# Patient Record
Sex: FEMALE | Race: WHITE | NOT HISPANIC OR LATINO
[De-identification: names, ages, dates, MRNs, and addresses within clinical notes are randomized per-mention and may not be internally consistent; named-entity substitution may affect disease eponyms.]

---

## 2016-12-19 RX ORDER — ASPIRIN/CALCIUM CARB/MAGNESIUM 324 MG
1 TABLET ORAL
Qty: 0 | Refills: 0 | DISCHARGE
Start: 2016-12-19

## 2017-01-04 ENCOUNTER — APPOINTMENT (OUTPATIENT)
Dept: CARDIOTHORACIC SURGERY | Facility: CLINIC | Age: 77
End: 2017-01-04

## 2017-01-04 VITALS
HEIGHT: 64 IN | SYSTOLIC BLOOD PRESSURE: 163 MMHG | HEART RATE: 78 BPM | OXYGEN SATURATION: 97 % | RESPIRATION RATE: 18 BRPM | DIASTOLIC BLOOD PRESSURE: 83 MMHG | WEIGHT: 155 LBS | BODY MASS INDEX: 26.46 KG/M2

## 2017-01-09 ENCOUNTER — APPOINTMENT (OUTPATIENT)
Dept: CARDIOLOGY | Facility: CLINIC | Age: 77
End: 2017-01-09

## 2017-01-12 ENCOUNTER — APPOINTMENT (OUTPATIENT)
Dept: CARDIOLOGY | Facility: CLINIC | Age: 77
End: 2017-01-12

## 2017-01-12 ENCOUNTER — NON-APPOINTMENT (OUTPATIENT)
Age: 77
End: 2017-01-12

## 2017-01-12 VITALS
HEIGHT: 64 IN | OXYGEN SATURATION: 97 % | WEIGHT: 155 LBS | HEART RATE: 68 BPM | BODY MASS INDEX: 26.46 KG/M2 | DIASTOLIC BLOOD PRESSURE: 86 MMHG | SYSTOLIC BLOOD PRESSURE: 145 MMHG

## 2017-01-12 RX ORDER — ASPIRIN 81 MG/1
81 TABLET ORAL
Qty: 30 | Refills: 4 | Status: ACTIVE | COMMUNITY
Start: 2017-01-12 | End: 1900-01-01

## 2017-01-18 ENCOUNTER — RECORD ABSTRACTING (OUTPATIENT)
Age: 77
End: 2017-01-18

## 2017-01-18 ENCOUNTER — APPOINTMENT (OUTPATIENT)
Dept: CARDIOTHORACIC SURGERY | Facility: CLINIC | Age: 77
End: 2017-01-18

## 2017-01-18 VITALS
HEART RATE: 84 BPM | DIASTOLIC BLOOD PRESSURE: 89 MMHG | HEIGHT: 64 IN | SYSTOLIC BLOOD PRESSURE: 163 MMHG | BODY MASS INDEX: 25.44 KG/M2 | RESPIRATION RATE: 18 BRPM | OXYGEN SATURATION: 98 % | WEIGHT: 149 LBS

## 2017-01-26 ENCOUNTER — RX RENEWAL (OUTPATIENT)
Age: 77
End: 2017-01-26

## 2017-01-31 ENCOUNTER — RX RENEWAL (OUTPATIENT)
Age: 77
End: 2017-01-31

## 2017-02-06 ENCOUNTER — APPOINTMENT (OUTPATIENT)
Dept: FAMILY MEDICINE | Facility: CLINIC | Age: 77
End: 2017-02-06

## 2017-02-06 VITALS
HEART RATE: 79 BPM | DIASTOLIC BLOOD PRESSURE: 92 MMHG | BODY MASS INDEX: 26.29 KG/M2 | WEIGHT: 154 LBS | OXYGEN SATURATION: 97 % | RESPIRATION RATE: 16 BRPM | HEIGHT: 64 IN | SYSTOLIC BLOOD PRESSURE: 162 MMHG

## 2017-05-04 ENCOUNTER — APPOINTMENT (OUTPATIENT)
Dept: FAMILY MEDICINE | Facility: CLINIC | Age: 77
End: 2017-05-04

## 2017-05-04 VITALS
HEIGHT: 64 IN | SYSTOLIC BLOOD PRESSURE: 156 MMHG | WEIGHT: 161.25 LBS | BODY MASS INDEX: 27.53 KG/M2 | DIASTOLIC BLOOD PRESSURE: 82 MMHG

## 2017-05-04 VITALS — DIASTOLIC BLOOD PRESSURE: 88 MMHG | SYSTOLIC BLOOD PRESSURE: 152 MMHG

## 2017-06-06 ENCOUNTER — RX RENEWAL (OUTPATIENT)
Age: 77
End: 2017-06-06

## 2017-06-22 ENCOUNTER — APPOINTMENT (OUTPATIENT)
Dept: FAMILY MEDICINE | Facility: CLINIC | Age: 77
End: 2017-06-22

## 2017-06-22 ENCOUNTER — RESULT CHARGE (OUTPATIENT)
Age: 77
End: 2017-06-22

## 2017-06-22 VITALS
WEIGHT: 160 LBS | DIASTOLIC BLOOD PRESSURE: 90 MMHG | HEART RATE: 87 BPM | TEMPERATURE: 97.5 F | HEIGHT: 64 IN | SYSTOLIC BLOOD PRESSURE: 132 MMHG | OXYGEN SATURATION: 95 % | BODY MASS INDEX: 27.31 KG/M2

## 2017-06-22 LAB
BILIRUB UR QL STRIP: NEGATIVE
CLARITY UR: CLEAR
COLLECTION METHOD: NORMAL
GLUCOSE UR-MCNC: NEGATIVE
HCG UR QL: 0.2 EU/DL
HGB UR QL STRIP.AUTO: NORMAL
KETONES UR-MCNC: NEGATIVE
LEUKOCYTE ESTERASE UR QL STRIP: NORMAL
NITRITE UR QL STRIP: NEGATIVE
PH UR STRIP: 5.5
PROT UR STRIP-MCNC: 100
SP GR UR STRIP: 1.01

## 2017-06-25 LAB
ALBUMIN SERPL ELPH-MCNC: 3.9 G/DL
ALP BLD-CCNC: 104 U/L
ALT SERPL-CCNC: 28 U/L
ANION GAP SERPL CALC-SCNC: 16 MMOL/L
AST SERPL-CCNC: 38 U/L
BILIRUB SERPL-MCNC: 0.2 MG/DL
BUN SERPL-MCNC: 20 MG/DL
CALCIUM SERPL-MCNC: 9.4 MG/DL
CHLORIDE SERPL-SCNC: 103 MMOL/L
CHOLEST SERPL-MCNC: 145 MG/DL
CHOLEST/HDLC SERPL: 3 RATIO
CO2 SERPL-SCNC: 22 MMOL/L
CREAT SERPL-MCNC: 0.83 MG/DL
GLUCOSE SERPL-MCNC: 99 MG/DL
HDLC SERPL-MCNC: 49 MG/DL
LDLC SERPL CALC-MCNC: 66 MG/DL
POTASSIUM SERPL-SCNC: 4.8 MMOL/L
PROT SERPL-MCNC: 7.8 G/DL
SODIUM SERPL-SCNC: 141 MMOL/L
T4 FREE SERPL-MCNC: 1.3 NG/DL
TRIGL SERPL-MCNC: 148 MG/DL
TSH SERPL-ACNC: 3.95 UIU/ML

## 2017-06-26 LAB — BACTERIA UR CULT: ABNORMAL

## 2017-07-24 ENCOUNTER — APPOINTMENT (OUTPATIENT)
Dept: CARDIOLOGY | Facility: CLINIC | Age: 77
End: 2017-07-24

## 2017-07-24 ENCOUNTER — NON-APPOINTMENT (OUTPATIENT)
Age: 77
End: 2017-07-24

## 2017-07-24 VITALS
DIASTOLIC BLOOD PRESSURE: 96 MMHG | SYSTOLIC BLOOD PRESSURE: 164 MMHG | BODY MASS INDEX: 27.31 KG/M2 | OXYGEN SATURATION: 96 % | WEIGHT: 160 LBS | HEART RATE: 70 BPM | HEIGHT: 64 IN

## 2017-08-22 ENCOUNTER — APPOINTMENT (OUTPATIENT)
Dept: FAMILY MEDICINE | Facility: CLINIC | Age: 77
End: 2017-08-22
Payer: MEDICARE

## 2017-08-22 ENCOUNTER — RESULT CHARGE (OUTPATIENT)
Age: 77
End: 2017-08-22

## 2017-08-22 VITALS
TEMPERATURE: 97.5 F | OXYGEN SATURATION: 96 % | HEIGHT: 64 IN | RESPIRATION RATE: 16 BRPM | WEIGHT: 160 LBS | BODY MASS INDEX: 27.31 KG/M2 | HEART RATE: 75 BPM | SYSTOLIC BLOOD PRESSURE: 158 MMHG | DIASTOLIC BLOOD PRESSURE: 90 MMHG

## 2017-08-22 LAB
BILIRUB UR QL STRIP: NEGATIVE
GLUCOSE UR-MCNC: NEGATIVE
HCG UR QL: 0.2 EU/DL
HGB UR QL STRIP.AUTO: NORMAL
KETONES UR-MCNC: NEGATIVE
LEUKOCYTE ESTERASE UR QL STRIP: NORMAL
NITRITE UR QL STRIP: NEGATIVE
PH UR STRIP: 5.5
PROT UR STRIP-MCNC: 100
SP GR UR STRIP: 1.01

## 2017-08-22 PROCEDURE — 81003 URINALYSIS AUTO W/O SCOPE: CPT | Mod: QW

## 2017-08-22 PROCEDURE — 99214 OFFICE O/P EST MOD 30 MIN: CPT | Mod: 25

## 2017-08-28 LAB — BACTERIA UR CULT: ABNORMAL

## 2017-09-01 ENCOUNTER — RX RENEWAL (OUTPATIENT)
Age: 77
End: 2017-09-01

## 2017-09-18 ENCOUNTER — APPOINTMENT (OUTPATIENT)
Dept: FAMILY MEDICINE | Facility: CLINIC | Age: 77
End: 2017-09-18

## 2017-10-02 ENCOUNTER — APPOINTMENT (OUTPATIENT)
Dept: FAMILY MEDICINE | Facility: CLINIC | Age: 77
End: 2017-10-02
Payer: MEDICARE

## 2017-10-02 VITALS
RESPIRATION RATE: 16 BRPM | DIASTOLIC BLOOD PRESSURE: 84 MMHG | OXYGEN SATURATION: 99 % | BODY MASS INDEX: 27.31 KG/M2 | WEIGHT: 160 LBS | HEART RATE: 77 BPM | HEIGHT: 64 IN | SYSTOLIC BLOOD PRESSURE: 134 MMHG

## 2017-10-02 PROCEDURE — G0439: CPT

## 2017-10-02 PROCEDURE — 90662 IIV NO PRSV INCREASED AG IM: CPT

## 2017-10-02 PROCEDURE — G0008: CPT

## 2017-10-02 RX ORDER — NITROFURANTOIN (MONOHYDRATE/MACROCRYSTALS) 25; 75 MG/1; MG/1
100 CAPSULE ORAL TWICE DAILY
Qty: 20 | Refills: 0 | Status: COMPLETED | COMMUNITY
Start: 2017-06-26 | End: 2017-10-02

## 2017-10-02 RX ORDER — SULFAMETHOXAZOLE AND TRIMETHOPRIM 800; 160 MG/1; MG/1
800-160 TABLET ORAL
Qty: 14 | Refills: 0 | Status: COMPLETED | COMMUNITY
Start: 2017-06-27 | End: 2017-10-02

## 2017-10-02 RX ORDER — NITROFURANTOIN (MONOHYDRATE/MACROCRYSTALS) 25; 75 MG/1; MG/1
100 CAPSULE ORAL TWICE DAILY
Qty: 20 | Refills: 0 | Status: COMPLETED | COMMUNITY
Start: 2017-08-28 | End: 2017-10-02

## 2017-10-03 RX ORDER — LISINOPRIL 5 MG/1
5 TABLET ORAL DAILY
Qty: 90 | Refills: 0 | Status: COMPLETED | COMMUNITY
Start: 2017-01-12 | End: 2017-10-03

## 2017-10-10 ENCOUNTER — NON-APPOINTMENT (OUTPATIENT)
Age: 77
End: 2017-10-10

## 2017-10-10 ENCOUNTER — APPOINTMENT (OUTPATIENT)
Dept: CARDIOLOGY | Facility: CLINIC | Age: 77
End: 2017-10-10
Payer: MEDICARE

## 2017-10-10 VITALS
HEART RATE: 76 BPM | DIASTOLIC BLOOD PRESSURE: 84 MMHG | SYSTOLIC BLOOD PRESSURE: 140 MMHG | WEIGHT: 160 LBS | BODY MASS INDEX: 27.31 KG/M2 | OXYGEN SATURATION: 95 % | HEIGHT: 64 IN

## 2017-10-10 PROCEDURE — 99215 OFFICE O/P EST HI 40 MIN: CPT | Mod: 25

## 2017-10-10 PROCEDURE — 93000 ELECTROCARDIOGRAM COMPLETE: CPT

## 2017-10-13 ENCOUNTER — RX RENEWAL (OUTPATIENT)
Age: 77
End: 2017-10-13

## 2017-10-13 LAB
25(OH)D3 SERPL-MCNC: 47.5 NG/ML
ALBUMIN SERPL ELPH-MCNC: 3.9 G/DL
ALP BLD-CCNC: 113 U/L
ALT SERPL-CCNC: 33 U/L
ANION GAP SERPL CALC-SCNC: 13 MMOL/L
APPEARANCE: CLEAR
AST SERPL-CCNC: 27 U/L
BACTERIA: NEGATIVE
BASOPHILS # BLD AUTO: 0.05 K/UL
BASOPHILS NFR BLD AUTO: 0.7 %
BILIRUB SERPL-MCNC: 0.3 MG/DL
BILIRUBIN URINE: NEGATIVE
BLOOD URINE: NEGATIVE
BUN SERPL-MCNC: 17 MG/DL
CALCIUM SERPL-MCNC: 9.9 MG/DL
CHLORIDE SERPL-SCNC: 104 MMOL/L
CHOLEST SERPL-MCNC: 155 MG/DL
CHOLEST/HDLC SERPL: 3.4 RATIO
CO2 SERPL-SCNC: 26 MMOL/L
COLOR: YELLOW
CREAT SERPL-MCNC: 1.06 MG/DL
EOSINOPHIL # BLD AUTO: 0.25 K/UL
EOSINOPHIL NFR BLD AUTO: 3.7 %
GLUCOSE QUALITATIVE U: NEGATIVE MG/DL
GLUCOSE SERPL-MCNC: 93 MG/DL
HBA1C MFR BLD HPLC: 5.9 %
HCT VFR BLD CALC: 43.2 %
HDLC SERPL-MCNC: 45 MG/DL
HGB BLD-MCNC: 13.7 G/DL
HYALINE CASTS: 1 /LPF
IMM GRANULOCYTES NFR BLD AUTO: 0.4 %
KETONES URINE: NEGATIVE
LDLC SERPL CALC-MCNC: 81 MG/DL
LEUKOCYTE ESTERASE URINE: NEGATIVE
LYMPHOCYTES # BLD AUTO: 2.9 K/UL
LYMPHOCYTES NFR BLD AUTO: 43.3 %
MAN DIFF?: NORMAL
MCHC RBC-ENTMCNC: 26.6 PG
MCHC RBC-ENTMCNC: 31.7 GM/DL
MCV RBC AUTO: 83.7 FL
MICROSCOPIC-UA: NORMAL
MONOCYTES # BLD AUTO: 0.5 K/UL
MONOCYTES NFR BLD AUTO: 7.5 %
NEUTROPHILS # BLD AUTO: 2.97 K/UL
NEUTROPHILS NFR BLD AUTO: 44.4 %
NITRITE URINE: NEGATIVE
PH URINE: 5.5
PLATELET # BLD AUTO: 236 K/UL
POTASSIUM SERPL-SCNC: 5.2 MMOL/L
PROT SERPL-MCNC: 7.8 G/DL
PROTEIN URINE: NEGATIVE MG/DL
RBC # BLD: 5.16 M/UL
RBC # FLD: 15.2 %
RED BLOOD CELLS URINE: 1 /HPF
SODIUM SERPL-SCNC: 143 MMOL/L
SPECIFIC GRAVITY URINE: 1.01
SQUAMOUS EPITHELIAL CELLS: 1 /HPF
T4 FREE SERPL-MCNC: 1.1 NG/DL
TRIGL SERPL-MCNC: 143 MG/DL
TSH SERPL-ACNC: 3.15 UIU/ML
UROBILINOGEN URINE: NEGATIVE MG/DL
WBC # FLD AUTO: 6.7 K/UL
WHITE BLOOD CELLS URINE: 1 /HPF

## 2017-12-01 ENCOUNTER — RX RENEWAL (OUTPATIENT)
Age: 77
End: 2017-12-01

## 2017-12-08 ENCOUNTER — RX RENEWAL (OUTPATIENT)
Age: 77
End: 2017-12-08

## 2018-01-11 ENCOUNTER — RX RENEWAL (OUTPATIENT)
Age: 78
End: 2018-01-11

## 2018-02-05 ENCOUNTER — APPOINTMENT (OUTPATIENT)
Dept: CARDIOLOGY | Facility: CLINIC | Age: 78
End: 2018-02-05
Payer: MEDICARE

## 2018-02-12 ENCOUNTER — APPOINTMENT (OUTPATIENT)
Dept: CARDIOLOGY | Facility: CLINIC | Age: 78
End: 2018-02-12
Payer: MEDICARE

## 2018-02-12 PROCEDURE — 93306 TTE W/DOPPLER COMPLETE: CPT

## 2018-02-13 ENCOUNTER — APPOINTMENT (OUTPATIENT)
Dept: FAMILY MEDICINE | Facility: CLINIC | Age: 78
End: 2018-02-13
Payer: MEDICARE

## 2018-02-13 VITALS
SYSTOLIC BLOOD PRESSURE: 142 MMHG | BODY MASS INDEX: 27.31 KG/M2 | WEIGHT: 160 LBS | HEIGHT: 64 IN | DIASTOLIC BLOOD PRESSURE: 74 MMHG

## 2018-02-13 VITALS — DIASTOLIC BLOOD PRESSURE: 82 MMHG | SYSTOLIC BLOOD PRESSURE: 134 MMHG

## 2018-02-13 PROCEDURE — 99214 OFFICE O/P EST MOD 30 MIN: CPT

## 2018-02-13 RX ORDER — MIRTAZAPINE 15 MG/1
15 TABLET, FILM COATED ORAL
Qty: 1 | Refills: 0 | Status: COMPLETED | COMMUNITY
Start: 2017-10-02 | End: 2018-02-13

## 2018-02-13 RX ORDER — LISINOPRIL 10 MG/1
10 TABLET ORAL DAILY
Qty: 90 | Refills: 0 | Status: COMPLETED | COMMUNITY
Start: 2017-05-04 | End: 2018-02-13

## 2018-02-13 RX ORDER — OXYCODONE AND ACETAMINOPHEN 5; 325 MG/1; MG/1
5-325 TABLET ORAL
Qty: 50 | Refills: 0 | Status: COMPLETED | COMMUNITY
Start: 2017-01-31 | End: 2018-02-13

## 2018-03-08 ENCOUNTER — RX RENEWAL (OUTPATIENT)
Age: 78
End: 2018-03-08

## 2018-03-27 LAB
ALBUMIN SERPL ELPH-MCNC: 3.9 G/DL
ALP BLD-CCNC: 112 U/L
ALT SERPL-CCNC: 33 U/L
ANION GAP SERPL CALC-SCNC: 14 MMOL/L
AST SERPL-CCNC: 31 U/L
BILIRUB SERPL-MCNC: 0.3 MG/DL
BUN SERPL-MCNC: 17 MG/DL
CALCIUM SERPL-MCNC: 9.5 MG/DL
CHLORIDE SERPL-SCNC: 101 MMOL/L
CHOLEST SERPL-MCNC: 154 MG/DL
CHOLEST/HDLC SERPL: 3.5 RATIO
CO2 SERPL-SCNC: 26 MMOL/L
CREAT SERPL-MCNC: 0.88 MG/DL
GLUCOSE SERPL-MCNC: 103 MG/DL
HDLC SERPL-MCNC: 44 MG/DL
LDLC SERPL CALC-MCNC: 69 MG/DL
POTASSIUM SERPL-SCNC: 4.8 MMOL/L
PROT SERPL-MCNC: 7.6 G/DL
SODIUM SERPL-SCNC: 141 MMOL/L
TRIGL SERPL-MCNC: 204 MG/DL

## 2018-03-28 ENCOUNTER — RX RENEWAL (OUTPATIENT)
Age: 78
End: 2018-03-28

## 2018-03-30 ENCOUNTER — OTHER (OUTPATIENT)
Age: 78
End: 2018-03-30

## 2018-04-17 ENCOUNTER — NON-APPOINTMENT (OUTPATIENT)
Age: 78
End: 2018-04-17

## 2018-04-17 ENCOUNTER — APPOINTMENT (OUTPATIENT)
Dept: CARDIOLOGY | Facility: CLINIC | Age: 78
End: 2018-04-17
Payer: MEDICARE

## 2018-04-17 VITALS
BODY MASS INDEX: 26.46 KG/M2 | HEIGHT: 64 IN | OXYGEN SATURATION: 93 % | HEART RATE: 72 BPM | WEIGHT: 155 LBS | SYSTOLIC BLOOD PRESSURE: 130 MMHG | DIASTOLIC BLOOD PRESSURE: 70 MMHG

## 2018-04-17 VITALS — SYSTOLIC BLOOD PRESSURE: 130 MMHG | HEART RATE: 72 BPM | DIASTOLIC BLOOD PRESSURE: 70 MMHG | OXYGEN SATURATION: 93 %

## 2018-04-17 PROCEDURE — 93000 ELECTROCARDIOGRAM COMPLETE: CPT

## 2018-04-17 PROCEDURE — 99214 OFFICE O/P EST MOD 30 MIN: CPT | Mod: 25

## 2018-04-17 RX ORDER — ALPRAZOLAM 1 MG/1
1 TABLET ORAL
Qty: 60 | Refills: 0 | Status: DISCONTINUED | COMMUNITY
Start: 2017-02-06 | End: 2018-04-17

## 2018-04-30 ENCOUNTER — RX RENEWAL (OUTPATIENT)
Age: 78
End: 2018-04-30

## 2018-05-03 ENCOUNTER — RX RENEWAL (OUTPATIENT)
Age: 78
End: 2018-05-03

## 2018-05-03 RX ORDER — ALPRAZOLAM 1 MG/1
1 TABLET, ORALLY DISINTEGRATING ORAL DAILY
Refills: 0 | Status: COMPLETED | COMMUNITY
End: 2018-05-03

## 2018-05-14 ENCOUNTER — APPOINTMENT (OUTPATIENT)
Dept: FAMILY MEDICINE | Facility: CLINIC | Age: 78
End: 2018-05-14
Payer: MEDICARE

## 2018-05-14 VITALS
OXYGEN SATURATION: 97 % | HEART RATE: 72 BPM | HEIGHT: 64 IN | SYSTOLIC BLOOD PRESSURE: 144 MMHG | RESPIRATION RATE: 16 BRPM | DIASTOLIC BLOOD PRESSURE: 70 MMHG | WEIGHT: 160 LBS | BODY MASS INDEX: 27.31 KG/M2

## 2018-05-14 VITALS — DIASTOLIC BLOOD PRESSURE: 82 MMHG | SYSTOLIC BLOOD PRESSURE: 138 MMHG

## 2018-05-14 PROCEDURE — 99214 OFFICE O/P EST MOD 30 MIN: CPT

## 2018-06-07 ENCOUNTER — APPOINTMENT (OUTPATIENT)
Dept: FAMILY MEDICINE | Facility: CLINIC | Age: 78
End: 2018-06-07
Payer: MEDICARE

## 2018-06-07 VITALS
HEIGHT: 64 IN | DIASTOLIC BLOOD PRESSURE: 80 MMHG | TEMPERATURE: 97.5 F | OXYGEN SATURATION: 98 % | HEART RATE: 84 BPM | SYSTOLIC BLOOD PRESSURE: 120 MMHG | BODY MASS INDEX: 27.31 KG/M2 | WEIGHT: 160 LBS

## 2018-06-07 LAB
BILIRUB UR QL STRIP: NEGATIVE
CLARITY UR: CLEAR
COLLECTION METHOD: NORMAL
GLUCOSE UR-MCNC: NEGATIVE
HCG UR QL: 0.2 EU/DL
HGB UR QL STRIP.AUTO: NORMAL
KETONES UR-MCNC: NORMAL
LEUKOCYTE ESTERASE UR QL STRIP: NORMAL
NITRITE UR QL STRIP: NEGATIVE
PH UR STRIP: 5.5
PROT UR STRIP-MCNC: NORMAL
SP GR UR STRIP: 1.02

## 2018-06-07 PROCEDURE — 81003 URINALYSIS AUTO W/O SCOPE: CPT | Mod: QW

## 2018-06-07 PROCEDURE — 99213 OFFICE O/P EST LOW 20 MIN: CPT | Mod: 25

## 2018-06-10 RX ORDER — AMOXICILLIN 500 MG/1
500 CAPSULE ORAL
Qty: 21 | Refills: 0 | Status: COMPLETED | COMMUNITY
Start: 2018-04-10

## 2018-06-10 NOTE — HISTORY OF PRESENT ILLNESS
[FreeTextEntry8] : Pt c/o dysuria and inc urinary frequency for several days. Denies CP, palpitations, dyspnea, n/v, fever, chills, abd pain.

## 2018-06-11 LAB — BACTERIA UR CULT: ABNORMAL

## 2018-07-31 ENCOUNTER — RX RENEWAL (OUTPATIENT)
Age: 78
End: 2018-07-31

## 2018-08-31 ENCOUNTER — RX RENEWAL (OUTPATIENT)
Age: 78
End: 2018-08-31

## 2018-10-08 ENCOUNTER — APPOINTMENT (OUTPATIENT)
Dept: FAMILY MEDICINE | Facility: CLINIC | Age: 78
End: 2018-10-08
Payer: MEDICARE

## 2018-10-08 VITALS
WEIGHT: 160 LBS | HEIGHT: 64 IN | HEART RATE: 76 BPM | BODY MASS INDEX: 27.31 KG/M2 | OXYGEN SATURATION: 97 % | SYSTOLIC BLOOD PRESSURE: 130 MMHG | DIASTOLIC BLOOD PRESSURE: 84 MMHG

## 2018-10-08 PROCEDURE — G0439: CPT

## 2018-10-10 NOTE — HEALTH RISK ASSESSMENT
[Patient reported mammogram was normal] : Patient reported mammogram was normal [Patient reported PAP Smear was normal] : Patient reported PAP Smear was normal [Patient reported bone density results were normal] : Patient reported bone density results were normal [Patient reported colonoscopy was normal] : Patient reported colonoscopy was normal [Fully functional (bathing, dressing, toileting, transferring, walking, feeding)] : Fully functional (bathing, dressing, toileting, transferring, walking, feeding) [Fully functional (using the telephone, shopping, preparing meals, housekeeping, doing laundry, using] : Fully functional and needs no help or supervision to perform IADLs (using the telephone, shopping, preparing meals, housekeeping, doing laundry, using transportation, managing medications and managing finances) [Smoke Detector] : smoke detector [Carbon Monoxide Detector] : carbon monoxide detector [Seat Belt] :  uses seat belt [Discussed at today's visit] : Advance Directives Discussed at today's visit [No changes since last discussed ___] : No changes since last discussed  [unfilled] [Designated Healthcare Proxy] : Designated healthcare proxy [Name: ___] : Health Care Proxy's Name: [unfilled]  [Aggressive treatment] : aggressive treatment [Very Good] : ~his/her~  mood as very good [] : No [No falls in past year] : Patient reported no falls in the past year [1] : 2) Feeling down, depressed, or hopeless for several days (1) [[id number]] : 2 [HIV test declined] : HIV test declined [Hepatitis C test declined] : Hepatitis C test declined [Change in mental status noted] : No change in mental status noted [Language] : denies difficulty with language [Behavior] : denies difficulty with behavior [Handling Complex Tasks] : denies difficulty handling complex tasks [Reasoning] : denies difficulty with reasoning [Spatial Ability and Orientation] : denies difficulty with spatial ability and orientation [None] : None [With Significant Other] : lives with significant other [] :  [Reports changes in hearing] : Reports no changes in hearing [Reports changes in vision] : Reports no changes in vision [Reports changes in dental health] : Reports no changes in dental health [MammogramDate] : 01/13 [MammogramComments] : d [PapSmearDate] : 01/13 [BoneDensityDate] : 01/10 [ColonoscopyDate] : 01/14 [Relationship: ___] : Relationship: [unfilled]

## 2018-10-10 NOTE — HISTORY OF PRESENT ILLNESS
[de-identified] : Pt in office for CPE. Pt has CAD s/p 3 vessel CABG, sees cardio Dr. Salgado regularly. Pt has been feeling well. Denies CP, palpitations, dyspnea, n/v.\par Exsmoker\par ETOH use social\par Drug use denies\par Exercises irregularly \par Diet balanced, doing weight watchers\par

## 2018-10-11 ENCOUNTER — FORM ENCOUNTER (OUTPATIENT)
Age: 78
End: 2018-10-11

## 2018-10-12 ENCOUNTER — OUTPATIENT (OUTPATIENT)
Dept: OUTPATIENT SERVICES | Facility: HOSPITAL | Age: 78
LOS: 1 days | End: 2018-10-12
Payer: COMMERCIAL

## 2018-10-12 ENCOUNTER — APPOINTMENT (OUTPATIENT)
Dept: RADIOLOGY | Facility: CLINIC | Age: 78
End: 2018-10-12
Payer: MEDICARE

## 2018-10-12 DIAGNOSIS — Z00.8 ENCOUNTER FOR OTHER GENERAL EXAMINATION: ICD-10-CM

## 2018-10-12 DIAGNOSIS — Z98.49 CATARACT EXTRACTION STATUS, UNSPECIFIED EYE: Chronic | ICD-10-CM

## 2018-10-12 LAB
25(OH)D3 SERPL-MCNC: 35.5 NG/ML
ALBUMIN SERPL ELPH-MCNC: 4.2 G/DL
ALP BLD-CCNC: 107 U/L
ALT SERPL-CCNC: 22 U/L
ANION GAP SERPL CALC-SCNC: 12 MMOL/L
APPEARANCE: CLEAR
AST SERPL-CCNC: 27 U/L
BACTERIA: NEGATIVE
BASOPHILS # BLD AUTO: 0.05 K/UL
BASOPHILS NFR BLD AUTO: 0.6 %
BILIRUB SERPL-MCNC: 0.3 MG/DL
BILIRUBIN URINE: NEGATIVE
BLOOD URINE: NEGATIVE
BUN SERPL-MCNC: 20 MG/DL
CALCIUM SERPL-MCNC: 10 MG/DL
CHLORIDE SERPL-SCNC: 104 MMOL/L
CHOLEST SERPL-MCNC: 156 MG/DL
CHOLEST/HDLC SERPL: 3.8 RATIO
CO2 SERPL-SCNC: 27 MMOL/L
COLOR: YELLOW
CREAT SERPL-MCNC: 0.93 MG/DL
EOSINOPHIL # BLD AUTO: 0.26 K/UL
EOSINOPHIL NFR BLD AUTO: 3 %
GLUCOSE QUALITATIVE U: NEGATIVE MG/DL
GLUCOSE SERPL-MCNC: 92 MG/DL
HBA1C MFR BLD HPLC: 6.1 %
HCT VFR BLD CALC: 46.1 %
HDLC SERPL-MCNC: 41 MG/DL
HGB BLD-MCNC: 14.3 G/DL
HYALINE CASTS: 1 /LPF
IMM GRANULOCYTES NFR BLD AUTO: 0.2 %
KETONES URINE: NEGATIVE
LDLC SERPL CALC-MCNC: 81 MG/DL
LEUKOCYTE ESTERASE URINE: NEGATIVE
LYMPHOCYTES # BLD AUTO: 3.03 K/UL
LYMPHOCYTES NFR BLD AUTO: 35.5 %
MAN DIFF?: NORMAL
MCHC RBC-ENTMCNC: 26.5 PG
MCHC RBC-ENTMCNC: 31 GM/DL
MCV RBC AUTO: 85.4 FL
MICROSCOPIC-UA: NORMAL
MONOCYTES # BLD AUTO: 0.54 K/UL
MONOCYTES NFR BLD AUTO: 6.3 %
NEUTROPHILS # BLD AUTO: 4.64 K/UL
NEUTROPHILS NFR BLD AUTO: 54.4 %
NITRITE URINE: NEGATIVE
PH URINE: 5.5
PLATELET # BLD AUTO: 264 K/UL
POTASSIUM SERPL-SCNC: 5 MMOL/L
PROT SERPL-MCNC: 7.9 G/DL
PROTEIN URINE: NEGATIVE MG/DL
RBC # BLD: 5.4 M/UL
RBC # FLD: 15 %
RED BLOOD CELLS URINE: 2 /HPF
SODIUM SERPL-SCNC: 143 MMOL/L
SPECIFIC GRAVITY URINE: 1.02
SQUAMOUS EPITHELIAL CELLS: 1 /HPF
T4 FREE SERPL-MCNC: 1.1 NG/DL
TRIGL SERPL-MCNC: 171 MG/DL
TSH SERPL-ACNC: 3.04 UIU/ML
UROBILINOGEN URINE: NEGATIVE MG/DL
WBC # FLD AUTO: 8.54 K/UL
WHITE BLOOD CELLS URINE: 2 /HPF

## 2018-10-12 PROCEDURE — 77080 DXA BONE DENSITY AXIAL: CPT

## 2018-10-12 PROCEDURE — 77080 DXA BONE DENSITY AXIAL: CPT | Mod: 26

## 2018-10-23 ENCOUNTER — RX RENEWAL (OUTPATIENT)
Age: 78
End: 2018-10-23

## 2018-11-14 ENCOUNTER — NON-APPOINTMENT (OUTPATIENT)
Age: 78
End: 2018-11-14

## 2018-11-14 ENCOUNTER — APPOINTMENT (OUTPATIENT)
Dept: CARDIOLOGY | Facility: CLINIC | Age: 78
End: 2018-11-14
Payer: MEDICARE

## 2018-11-14 VITALS
DIASTOLIC BLOOD PRESSURE: 78 MMHG | HEIGHT: 64 IN | OXYGEN SATURATION: 95 % | WEIGHT: 160 LBS | BODY MASS INDEX: 27.31 KG/M2 | HEART RATE: 75 BPM | SYSTOLIC BLOOD PRESSURE: 158 MMHG

## 2018-11-14 PROCEDURE — 99214 OFFICE O/P EST MOD 30 MIN: CPT | Mod: 25

## 2018-11-14 PROCEDURE — 93000 ELECTROCARDIOGRAM COMPLETE: CPT

## 2018-11-14 RX ORDER — NITROFURANTOIN (MONOHYDRATE/MACROCRYSTALS) 25; 75 MG/1; MG/1
100 CAPSULE ORAL
Qty: 14 | Refills: 0 | Status: DISCONTINUED | COMMUNITY
Start: 2018-06-07 | End: 2018-11-14

## 2018-11-14 NOTE — DISCUSSION/SUMMARY
[FreeTextEntry1] : Pt is a 79 y/o F with PMH CAD s/p 3V CABG 12/2016, HTN, HLD who presents today for f/u.  She has been getting worsening L sided CP\par Will check nuclear stress test to eval for ischemia\par Will check transthoracic echocardiogram to evaluate left ventricular function and assess for any structural abnormalities\par BP elevated today but usually well controleld - will monitor\par c/w metoprolol, lisinopril\par c/w ASA and statin for CAD history\par She follows with her PCP regularly\par The described plan was discussed with the pt.  All questions and concerns were addressed to the best of my knowledge.\par

## 2018-11-14 NOTE — PHYSICAL EXAM
[General Appearance - Well Developed] : well developed [Normal Appearance] : normal appearance [Well Groomed] : well groomed [General Appearance - Well Nourished] : well nourished [No Deformities] : no deformities [General Appearance - In No Acute Distress] : no acute distress [Normal Conjunctiva] : the conjunctiva exhibited no abnormalities [Eyelids - No Xanthelasma] : the eyelids demonstrated no xanthelasmas [Normal Oral Mucosa] : normal oral mucosa [No Oral Pallor] : no oral pallor [No Oral Cyanosis] : no oral cyanosis [Respiration, Rhythm And Depth] : normal respiratory rhythm and effort [Exaggerated Use Of Accessory Muscles For Inspiration] : no accessory muscle use [Auscultation Breath Sounds / Voice Sounds] : lungs were clear to auscultation bilaterally [Heart Rate And Rhythm] : heart rate and rhythm were normal [Heart Sounds] : normal S1 and S2 [Murmurs] : no murmurs present [Arterial Pulses Normal] : the arterial pulses were normal [Edema] : no peripheral edema present [Bowel Sounds] : normal bowel sounds [Abdomen Soft] : soft [Abdomen Tenderness] : non-tender [Abnormal Walk] : normal gait [Nail Clubbing] : no clubbing of the fingernails [Skin Color & Pigmentation] : normal skin color and pigmentation [Skin Turgor] : normal skin turgor [] : no rash [Oriented To Time, Place, And Person] : oriented to person, place, and time [Impaired Insight] : insight and judgment were intact [Affect] : the affect was normal [No Anxiety] : not feeling anxious [FreeTextEntry1] : Mid-line incision scar healing well

## 2018-11-14 NOTE — HISTORY OF PRESENT ILLNESS
[FreeTextEntry1] : 78 year old female with a history of 3 vessel CABG including LIMA to LAD for triple vessel CAD with systolic function 45-50% seen on TTE 01/2017.  Repeat TTE 02/2018 shows low-normal LV function with mild valvular regurgitation.  She has done well post-operatively and is compliant with meds.  She states that she has been getting L sided rib pain with exertion that she cannot describe.  This has been going on for awhile but recently it has been getting worse.  Symptoms resolve with rest and she denies any other associated symptoms\par Of note prior to CABG she states that she was feeling well but ECG was abnormal - does note some LUCIA\par

## 2018-11-14 NOTE — REVIEW OF SYSTEMS
[see HPI] : see HPI [Negative] : Endocrine [Shortness Of Breath] : no shortness of breath [Dyspnea on exertion] : not dyspnea during exertion [Chest  Pressure] : no chest pressure [Chest Pain] : chest pain [Lower Ext Edema] : no extremity edema [Palpitations] : no palpitations [Joint Stiffness] : no joint stiffness [Numbness (Hypesthesia)] : no numbness [FreeTextEntry2] : Numbness in left fingers

## 2018-11-14 NOTE — REASON FOR VISIT
[Follow-Up - Clinic] : a clinic follow-up of [Carotid Artery Stenosis] : carotid stenosis [Coronary Artery Disease] : coronary artery disease [Hyperlipidemia] : hyperlipidemia [Hypertension] : hypertension [FreeTextEntry1] : CABG 12/2016

## 2018-11-28 ENCOUNTER — APPOINTMENT (OUTPATIENT)
Dept: CARDIOLOGY | Facility: CLINIC | Age: 78
End: 2018-11-28
Payer: MEDICARE

## 2018-11-28 PROCEDURE — 93306 TTE W/DOPPLER COMPLETE: CPT

## 2018-12-05 ENCOUNTER — APPOINTMENT (OUTPATIENT)
Dept: CARDIOLOGY | Facility: CLINIC | Age: 78
End: 2018-12-05

## 2018-12-07 ENCOUNTER — OTHER (OUTPATIENT)
Age: 78
End: 2018-12-07

## 2018-12-10 ENCOUNTER — RX RENEWAL (OUTPATIENT)
Age: 78
End: 2018-12-10

## 2018-12-12 ENCOUNTER — APPOINTMENT (OUTPATIENT)
Dept: CARDIOLOGY | Facility: CLINIC | Age: 78
End: 2018-12-12
Payer: MEDICARE

## 2018-12-12 PROCEDURE — 78452 HT MUSCLE IMAGE SPECT MULT: CPT

## 2018-12-12 PROCEDURE — A9500: CPT

## 2018-12-12 PROCEDURE — 93017 CV STRESS TEST TRACING ONLY: CPT

## 2018-12-12 PROCEDURE — 93018 CV STRESS TEST I&R ONLY: CPT

## 2018-12-14 ENCOUNTER — OTHER (OUTPATIENT)
Age: 78
End: 2018-12-14

## 2018-12-29 ENCOUNTER — APPOINTMENT (OUTPATIENT)
Dept: FAMILY MEDICINE | Facility: CLINIC | Age: 78
End: 2018-12-29
Payer: MEDICARE

## 2018-12-29 VITALS
TEMPERATURE: 97.9 F | SYSTOLIC BLOOD PRESSURE: 140 MMHG | DIASTOLIC BLOOD PRESSURE: 90 MMHG | HEART RATE: 67 BPM | RESPIRATION RATE: 16 BRPM | HEIGHT: 64 IN | WEIGHT: 160 LBS | OXYGEN SATURATION: 94 % | BODY MASS INDEX: 27.31 KG/M2

## 2018-12-29 LAB
BILIRUB UR QL STRIP: NEGATIVE
GLUCOSE UR-MCNC: NEGATIVE
HCG UR QL: 0.2 EU/DL
HGB UR QL STRIP.AUTO: NEGATIVE
KETONES UR-MCNC: NEGATIVE
LEUKOCYTE ESTERASE UR QL STRIP: NORMAL
NITRITE UR QL STRIP: NEGATIVE
PH UR STRIP: 5.5
PROT UR STRIP-MCNC: NEGATIVE
SP GR UR STRIP: 1.02

## 2018-12-29 PROCEDURE — 81003 URINALYSIS AUTO W/O SCOPE: CPT | Mod: QW

## 2018-12-29 PROCEDURE — 99214 OFFICE O/P EST MOD 30 MIN: CPT | Mod: 25

## 2018-12-29 NOTE — REVIEW OF SYSTEMS
[Back Pain] : back pain [Negative] : Genitourinary [FreeTextEntry7] : see hpi [FreeTextEntry9] : see hpi

## 2018-12-29 NOTE — HISTORY OF PRESENT ILLNESS
[FreeTextEntry8] : Pt c/o R low back pain radiating into R groin x 5 days. Pt taking tylenol and heating pad which helped somewhat. Pain worse w/ walking. Denies recent trauma or inc physical activity.

## 2018-12-29 NOTE — ASSESSMENT
[FreeTextEntry1] : R flank pain - possible abd muscle strain vs UTI vs renal stone. start macrobid, send urine cx. take ibuprofen 400mg bid x 4-5 days. if no improvement will order CT abd/pelvis stone hunt.

## 2019-01-03 LAB — BACTERIA UR CULT: ABNORMAL

## 2019-01-10 ENCOUNTER — CLINICAL ADVICE (OUTPATIENT)
Age: 79
End: 2019-01-10

## 2019-01-13 ENCOUNTER — FORM ENCOUNTER (OUTPATIENT)
Age: 79
End: 2019-01-13

## 2019-01-14 ENCOUNTER — OUTPATIENT (OUTPATIENT)
Dept: OUTPATIENT SERVICES | Facility: HOSPITAL | Age: 79
LOS: 1 days | End: 2019-01-14
Payer: COMMERCIAL

## 2019-01-14 ENCOUNTER — APPOINTMENT (OUTPATIENT)
Dept: CT IMAGING | Facility: CLINIC | Age: 79
End: 2019-01-14
Payer: MEDICARE

## 2019-01-14 DIAGNOSIS — R10.9 UNSPECIFIED ABDOMINAL PAIN: ICD-10-CM

## 2019-01-14 DIAGNOSIS — Z00.8 ENCOUNTER FOR OTHER GENERAL EXAMINATION: ICD-10-CM

## 2019-01-14 DIAGNOSIS — Z98.49 CATARACT EXTRACTION STATUS, UNSPECIFIED EYE: Chronic | ICD-10-CM

## 2019-01-14 PROCEDURE — 74176 CT ABD & PELVIS W/O CONTRAST: CPT | Mod: 26

## 2019-01-14 PROCEDURE — 74176 CT ABD & PELVIS W/O CONTRAST: CPT

## 2019-01-18 ENCOUNTER — CLINICAL ADVICE (OUTPATIENT)
Age: 79
End: 2019-01-18

## 2019-01-21 ENCOUNTER — RX RENEWAL (OUTPATIENT)
Age: 79
End: 2019-01-21

## 2019-01-27 ENCOUNTER — FORM ENCOUNTER (OUTPATIENT)
Age: 79
End: 2019-01-27

## 2019-01-28 ENCOUNTER — APPOINTMENT (OUTPATIENT)
Dept: CT IMAGING | Facility: CLINIC | Age: 79
End: 2019-01-28
Payer: MEDICARE

## 2019-01-28 ENCOUNTER — OUTPATIENT (OUTPATIENT)
Dept: OUTPATIENT SERVICES | Facility: HOSPITAL | Age: 79
LOS: 1 days | End: 2019-01-28
Payer: COMMERCIAL

## 2019-01-28 DIAGNOSIS — Z00.8 ENCOUNTER FOR OTHER GENERAL EXAMINATION: ICD-10-CM

## 2019-01-28 DIAGNOSIS — R91.1 SOLITARY PULMONARY NODULE: ICD-10-CM

## 2019-01-28 DIAGNOSIS — Z98.49 CATARACT EXTRACTION STATUS, UNSPECIFIED EYE: Chronic | ICD-10-CM

## 2019-01-28 PROCEDURE — 71250 CT THORAX DX C-: CPT | Mod: 26

## 2019-01-28 PROCEDURE — 71250 CT THORAX DX C-: CPT

## 2019-01-31 ENCOUNTER — LABORATORY RESULT (OUTPATIENT)
Age: 79
End: 2019-01-31

## 2019-01-31 ENCOUNTER — APPOINTMENT (OUTPATIENT)
Dept: UROLOGY | Facility: CLINIC | Age: 79
End: 2019-01-31
Payer: MEDICARE

## 2019-01-31 VITALS
SYSTOLIC BLOOD PRESSURE: 116 MMHG | RESPIRATION RATE: 14 BRPM | WEIGHT: 160 LBS | BODY MASS INDEX: 27.31 KG/M2 | DIASTOLIC BLOOD PRESSURE: 76 MMHG | HEART RATE: 79 BPM | HEIGHT: 64 IN

## 2019-01-31 LAB
BILIRUB UR QL STRIP: NORMAL
CLARITY UR: CLEAR
COLLECTION METHOD: NORMAL
GLUCOSE UR-MCNC: NORMAL
HCG UR QL: 0.2 EU/DL
HGB UR QL STRIP.AUTO: NORMAL
KETONES UR-MCNC: NORMAL
LEUKOCYTE ESTERASE UR QL STRIP: NORMAL
NITRITE UR QL STRIP: NORMAL
PH UR STRIP: 5.5
PROT UR STRIP-MCNC: NORMAL
SP GR UR STRIP: 1.02

## 2019-01-31 PROCEDURE — 81003 URINALYSIS AUTO W/O SCOPE: CPT | Mod: QW

## 2019-01-31 PROCEDURE — 99204 OFFICE O/P NEW MOD 45 MIN: CPT | Mod: 25

## 2019-01-31 NOTE — HISTORY OF PRESENT ILLNESS
[FreeTextEntry1] : 77 yo female presents for Kidney stone. \par Has been having off and on lower back pain and right sided abdominal pain since last 1 month, precipitated by activity, relief on lying down and warm blanket. \par No history of Kidney stone. \par Normal daytime frequency is every few hours or so depending on fluid: water, coffee/tea and soda intake. Nocturia of 0-1 x. \par Endorses incontinence with cough and sneeze. \par Denies dysuria, hematuria,fever, chills or rigors.\par Does get UTIs at least 1-2 x a year. \par

## 2019-01-31 NOTE — ASSESSMENT
[FreeTextEntry1] : Bilateral Kidney stones:\par Discussed pain not typical for Kidney stone. \par Asked to follow with Primary care physician to rule out other causes- consider CT LS spine. \par Discussed that Kidney stone appear to be vascular calcifications. \par Recommended Kidney stone prevention diet:\par Good oral hydration so that urine is clear to light yellow, usually 1.5 to 2 Liters of fluids, mainly water\par Increasing Citrate in diet by consuming citrus fruits and juices- lindsay, limes, oranges, grapefruits and berries \par Less red meat\par Less salt\par Limit foods with oxalate like- dark green vegetables, rhubarb, chocolate, wheat bran, nuts, cranberries, and beans\par \par Recurrent urinary tract infections:\par Recommended: good oral hydration, timed voiding, urinate right after sex, change sanitary pads often, avoid douches, bubble baths and other feminine hygiene products. \par Recommended OTC Cranberry tablets. \par Discussed that if she keeps having UTIs options would be to either use low dose vaginal estrogen cream or low prophylactic antibiotics.\par  \par Follow with Primary care physician. \par Return to clinic as needed.

## 2019-02-01 LAB
APPEARANCE: CLEAR
BILIRUBIN URINE: NEGATIVE
BLOOD URINE: NEGATIVE
COLOR: YELLOW
GLUCOSE QUALITATIVE U: NEGATIVE MG/DL
KETONES URINE: NEGATIVE
LEUKOCYTE ESTERASE URINE: ABNORMAL
NITRITE URINE: NEGATIVE
PH URINE: 5
PROTEIN URINE: NEGATIVE MG/DL
SPECIFIC GRAVITY URINE: 1.02
UROBILINOGEN URINE: NEGATIVE MG/DL

## 2019-02-14 ENCOUNTER — RX RENEWAL (OUTPATIENT)
Age: 79
End: 2019-02-14

## 2019-02-15 ENCOUNTER — APPOINTMENT (OUTPATIENT)
Dept: PULMONOLOGY | Facility: CLINIC | Age: 79
End: 2019-02-15
Payer: MEDICARE

## 2019-02-15 VITALS
HEART RATE: 80 BPM | WEIGHT: 160 LBS | BODY MASS INDEX: 28.35 KG/M2 | HEIGHT: 63 IN | OXYGEN SATURATION: 94 % | SYSTOLIC BLOOD PRESSURE: 110 MMHG | DIASTOLIC BLOOD PRESSURE: 70 MMHG

## 2019-02-15 PROCEDURE — 94729 DIFFUSING CAPACITY: CPT

## 2019-02-15 PROCEDURE — 94664 DEMO&/EVAL PT USE INHALER: CPT | Mod: 59

## 2019-02-15 PROCEDURE — 94727 GAS DIL/WSHOT DETER LNG VOL: CPT

## 2019-02-15 PROCEDURE — 85018 HEMOGLOBIN: CPT | Mod: QW

## 2019-02-15 PROCEDURE — 99204 OFFICE O/P NEW MOD 45 MIN: CPT | Mod: 25

## 2019-02-15 PROCEDURE — 94060 EVALUATION OF WHEEZING: CPT

## 2019-02-15 RX ORDER — TAMSULOSIN HYDROCHLORIDE 0.4 MG/1
0.4 CAPSULE ORAL
Qty: 30 | Refills: 0 | Status: DISCONTINUED | COMMUNITY
Start: 2019-01-18 | End: 2019-02-15

## 2019-02-15 RX ORDER — IBUPROFEN 600 MG/1
600 TABLET, FILM COATED ORAL
Qty: 30 | Refills: 0 | Status: DISCONTINUED | COMMUNITY
Start: 2019-01-07 | End: 2019-02-15

## 2019-02-15 RX ORDER — NITROFURANTOIN (MONOHYDRATE/MACROCRYSTALS) 25; 75 MG/1; MG/1
100 CAPSULE ORAL
Qty: 14 | Refills: 0 | Status: DISCONTINUED | COMMUNITY
Start: 2018-12-29 | End: 2019-02-15

## 2019-02-15 RX ORDER — CYCLOBENZAPRINE HYDROCHLORIDE 5 MG/1
5 TABLET, FILM COATED ORAL
Qty: 15 | Refills: 0 | Status: DISCONTINUED | COMMUNITY
Start: 2019-01-07 | End: 2019-02-15

## 2019-02-15 RX ORDER — FOSFOMYCIN TROMETHAMINE 3 G/1
3 POWDER ORAL
Qty: 1 | Refills: 0 | Status: DISCONTINUED | COMMUNITY
Start: 2019-01-03 | End: 2019-02-15

## 2019-02-15 RX ORDER — ONDANSETRON 4 MG/1
4 TABLET, ORALLY DISINTEGRATING ORAL
Qty: 15 | Refills: 0 | Status: DISCONTINUED | COMMUNITY
Start: 2019-01-03 | End: 2019-02-15

## 2019-02-15 NOTE — PROCEDURE
[FreeTextEntry1] : PFT: obstructive changes; mild restriction; dlco normal\par ----------\par EXAM: CT CHEST \par \par \par PROCEDURE DATE: 01/28/2019 \par \par \par \par INTERPRETATION: CT CHEST WITHOUT CONTRAST \par \par INDICATION: Pulmonary nodule shown on CT of the abdomen and pelvis. History \par of smoking. \par \par TECHNIQUE: Unenhanced helical images were obtained of the chest. Coronal and \par sagittal images were reconstructed. Maximum intensity projection images were \par generated. \par \par COMPARISON: CT 1/14/2019. \par \par FINDINGS: \par \par Tubes/Lines: None. \par \par Lungs And Airways: Emphysema. There are bilateral pulmonary nodules and \par groundglass opacities. \par \par The largest nodules and opacities measure: \par * A centrally lucent right lower lobe noncalcified nodule measures 7 x 10 \par mm (series 3 image 68). \par * A right lower lobe noncalcified nodule measures 4 mm (series 3 image 85). \par * A right upper lobe 6 x 8 groundglass opacity contains a 3 mm solid nodule \par (series 4 image 77). \par * A left upper lobe groundglass opacity measures 2 mm (series 3 image 100). \par * A left lower lobe noncalcified nodule measures 4 mm (series 3 image 101). \par \par The remainder of the lungs are clear. The airways are unremarkable. \par \par Pleura: No pleural effusion or pneumothorax. \par \par Mediastinum: The visualized thyroid gland is unremarkable. The esophagus is \par unremarkable. No enlarged chest lymph nodes. \par \par Heart and Vasculature: The heart is normal in size. Coronary calcifications. \par Status post CABG. No pericardial effusion. The aorta is normal in caliber. \par Atheromatous disease of the aorta. The main pulmonary artery is normal in \par caliber. \par \par Upper Abdomen: The 2.8 x 2.2 cm left adrenal adenoma is unchanged. \par \par Bones And Soft Tissues: Degenerative change of the spine.Status post median \par sternotomy. \par \par \par IMPRESSION: \par \par 1. A 7 x 10 mm right lower lobe centrally lucent nodule could represent \par malignancy. \par 2. The additional bilateral solid nodules and groundglass and solid \par opacities are indeterminate. \par \par \par \par \par \par \par \par \par WYATT NOVOA M.D., ATTENDING RADIOLOGIST \par This document has been electronically signed. Jan 29 2019 2:51PM \par \par

## 2019-02-15 NOTE — PHYSICAL EXAM
[General Appearance - In No Acute Distress] : no acute distress [Normal Conjunctiva] : the conjunctiva exhibited no abnormalities [Low Lying Soft Palate] : low lying soft palate [Elongated Uvula] : elongated uvula [III] : III [Neck Appearance] : the appearance of the neck was normal [Heart Rate And Rhythm] : heart rate and rhythm were normal [Heart Sounds] : normal S1 and S2 [] : no respiratory distress [Respiration, Rhythm And Depth] : normal respiratory rhythm and effort [Exaggerated Use Of Accessory Muscles For Inspiration] : no accessory muscle use [Auscultation Breath Sounds / Voice Sounds] : lungs were clear to auscultation bilaterally [Bowel Sounds] : normal bowel sounds [Abdomen Soft] : soft [Abnormal Walk] : normal gait [Nail Clubbing] : no clubbing of the fingernails [Cyanosis, Localized] : no localized cyanosis [Skin Color & Pigmentation] : normal skin color and pigmentation [No Focal Deficits] : no focal deficits [Normal Oropharynx] : abnormal oropharynx

## 2019-02-15 NOTE — REASON FOR VISIT
[Initial Eval - Existing Diagnosis] : an initial evaluation of an existing diagnosis [Abnormal CT Scan] : abnormal CT Scan [FreeTextEntry2] : lung nodules, emphysema

## 2019-02-15 NOTE — ASSESSMENT
[FreeTextEntry1] : Mild emphysema seen. Obstruction on PFTs but compeltely asymptomatic. No problems with exercise at gym. Nodules including solid and GG need f/u. Likely STU.

## 2019-02-15 NOTE — HISTORY OF PRESENT ILLNESS
[Daytime Somnolence] : daytime somnolence [Unintentional Sleep While Inactive] : unintentional sleep while inactive [FreeTextEntry1] : She had a CT chest done after imaging for back pain which revealed pulmonary nodules. Reviewed below. \par \par No sob, wheeze, cough, frequent bronchitis. \par \par Hx CAD, CABG.\par \par Cardiologist Dr Salgado.\par \par Quit smoking about 2004.

## 2019-02-25 ENCOUNTER — RX RENEWAL (OUTPATIENT)
Age: 79
End: 2019-02-25

## 2019-03-05 ENCOUNTER — RX RENEWAL (OUTPATIENT)
Age: 79
End: 2019-03-05

## 2019-04-02 ENCOUNTER — RX RENEWAL (OUTPATIENT)
Age: 79
End: 2019-04-02

## 2019-05-06 ENCOUNTER — RX RENEWAL (OUTPATIENT)
Age: 79
End: 2019-05-06

## 2019-05-09 ENCOUNTER — APPOINTMENT (OUTPATIENT)
Dept: FAMILY MEDICINE | Facility: CLINIC | Age: 79
End: 2019-05-09
Payer: MEDICARE

## 2019-05-09 VITALS
OXYGEN SATURATION: 97 % | HEIGHT: 63 IN | TEMPERATURE: 98 F | HEART RATE: 84 BPM | RESPIRATION RATE: 16 BRPM | BODY MASS INDEX: 28.35 KG/M2 | DIASTOLIC BLOOD PRESSURE: 70 MMHG | SYSTOLIC BLOOD PRESSURE: 118 MMHG | WEIGHT: 160 LBS

## 2019-05-09 PROCEDURE — 99214 OFFICE O/P EST MOD 30 MIN: CPT

## 2019-05-09 NOTE — REVIEW OF SYSTEMS
[Fever] : fever [Fatigue] : fatigue [Chills] : chills [Earache] : earache [Discharge] : no discharge [Vision Problems] : no vision problems [Sore Throat] : sore throat [Nasal Discharge] : nasal discharge [Chest Pain] : no chest pain [Palpitations] : no palpitations [Wheezing] : no wheezing [Shortness Of Breath] : no shortness of breath [Cough] : cough [Abdominal Pain] : no abdominal pain [Nausea] : no nausea [Vomiting] : no vomiting [Headache] : headache [Dizziness] : no dizziness

## 2019-05-09 NOTE — PHYSICAL EXAM
[No Acute Distress] : no acute distress [Normal Sclera/Conjunctiva] : normal sclera/conjunctiva [Well-Appearing] : well-appearing [Supple] : supple [PERRL] : pupils equal round and reactive to light [Clear to Auscultation] : lungs were clear to auscultation bilaterally [No Lymphadenopathy] : no lymphadenopathy [No Respiratory Distress] : no respiratory distress  [Regular Rhythm] : with a regular rhythm [Normal Rate] : normal rate  [Non Tender] : non-tender [Soft] : abdomen soft [Non-distended] : non-distended [Normal Bowel Sounds] : normal bowel sounds [No Rash] : no rash [Normal Insight/Judgement] : insight and judgment were intact [Normal Affect] : the affect was normal [de-identified] : L TM erythematous with bulging, erythematous oropharynx

## 2019-05-09 NOTE — PLAN
[FreeTextEntry1] : Anxiety- refill xanax\par L Otitis media- start augmentin. Cont supportive care including increased hydration. Can take tylenol for fever/aches. f/u if no relief

## 2019-05-09 NOTE — HISTORY OF PRESENT ILLNESS
[FreeTextEntry8] : 78 year old female complaining of 3 day history of productive cough, fever, chills, body aches, congestion and ear pain. States she could not get out of bed yesterday, feels slightly better today but still with fever. Also requesting refill on anxiety medication

## 2019-05-11 ENCOUNTER — RX CHANGE (OUTPATIENT)
Age: 79
End: 2019-05-11

## 2019-05-13 ENCOUNTER — OTHER (OUTPATIENT)
Age: 79
End: 2019-05-13

## 2019-05-13 ENCOUNTER — APPOINTMENT (OUTPATIENT)
Dept: FAMILY MEDICINE | Facility: CLINIC | Age: 79
End: 2019-05-13

## 2019-05-20 ENCOUNTER — FORM ENCOUNTER (OUTPATIENT)
Age: 79
End: 2019-05-20

## 2019-05-20 ENCOUNTER — APPOINTMENT (OUTPATIENT)
Dept: FAMILY MEDICINE | Facility: CLINIC | Age: 79
End: 2019-05-20
Payer: MEDICARE

## 2019-05-20 VITALS
OXYGEN SATURATION: 94 % | HEIGHT: 64 IN | SYSTOLIC BLOOD PRESSURE: 130 MMHG | BODY MASS INDEX: 27.31 KG/M2 | DIASTOLIC BLOOD PRESSURE: 82 MMHG | WEIGHT: 160 LBS | HEART RATE: 81 BPM | TEMPERATURE: 97.9 F

## 2019-05-20 PROCEDURE — 99214 OFFICE O/P EST MOD 30 MIN: CPT

## 2019-05-20 RX ORDER — AMOXICILLIN AND CLAVULANATE POTASSIUM 875; 125 MG/1; MG/1
875-125 TABLET, COATED ORAL
Qty: 14 | Refills: 0 | Status: DISCONTINUED | COMMUNITY
Start: 2019-05-09 | End: 2019-05-20

## 2019-05-20 NOTE — HISTORY OF PRESENT ILLNESS
[FreeTextEntry8] : Pt c/o productive cough x 13 days. Cough has worsened in past few days. Pt feels congested in throat. Tylenol, benadryl, and robitussin have not been helping. Pt also c/o myalgias. Denies fever, chills. Pt has hx of emphysema, pulm nodules and ground glass opacity on CT chest. Pt needs f/u CT chest.

## 2019-05-20 NOTE — ASSESSMENT
[FreeTextEntry1] : COPD exacerbation - start prednisone taper, proair prn, hycodan qhs prn severe cough.  checked\par pulmonary nodule, hx groundglass opacity on ct chest - f/u CT chest ordered

## 2019-05-21 ENCOUNTER — APPOINTMENT (OUTPATIENT)
Dept: CT IMAGING | Facility: CLINIC | Age: 79
End: 2019-05-21
Payer: MEDICARE

## 2019-05-21 ENCOUNTER — OUTPATIENT (OUTPATIENT)
Dept: OUTPATIENT SERVICES | Facility: HOSPITAL | Age: 79
LOS: 1 days | End: 2019-05-21
Payer: COMMERCIAL

## 2019-05-21 DIAGNOSIS — Z98.49 CATARACT EXTRACTION STATUS, UNSPECIFIED EYE: Chronic | ICD-10-CM

## 2019-05-21 DIAGNOSIS — R91.8 OTHER NONSPECIFIC ABNORMAL FINDING OF LUNG FIELD: ICD-10-CM

## 2019-05-21 PROCEDURE — 71250 CT THORAX DX C-: CPT | Mod: 26

## 2019-05-21 PROCEDURE — 71250 CT THORAX DX C-: CPT

## 2019-05-24 ENCOUNTER — OTHER (OUTPATIENT)
Age: 79
End: 2019-05-24

## 2019-06-25 ENCOUNTER — APPOINTMENT (OUTPATIENT)
Dept: PULMONOLOGY | Facility: CLINIC | Age: 79
End: 2019-06-25

## 2019-06-26 ENCOUNTER — RX RENEWAL (OUTPATIENT)
Age: 79
End: 2019-06-26

## 2019-07-21 ENCOUNTER — RX RENEWAL (OUTPATIENT)
Age: 79
End: 2019-07-21

## 2019-07-28 ENCOUNTER — RX RENEWAL (OUTPATIENT)
Age: 79
End: 2019-07-28

## 2019-07-30 ENCOUNTER — APPOINTMENT (OUTPATIENT)
Dept: FAMILY MEDICINE | Facility: CLINIC | Age: 79
End: 2019-07-30
Payer: MEDICARE

## 2019-07-30 VITALS
HEIGHT: 64 IN | SYSTOLIC BLOOD PRESSURE: 138 MMHG | DIASTOLIC BLOOD PRESSURE: 80 MMHG | RESPIRATION RATE: 16 BRPM | WEIGHT: 160 LBS | HEART RATE: 64 BPM | TEMPERATURE: 97.7 F | BODY MASS INDEX: 27.31 KG/M2 | OXYGEN SATURATION: 96 %

## 2019-07-30 PROCEDURE — 99213 OFFICE O/P EST LOW 20 MIN: CPT

## 2019-07-31 NOTE — HISTORY OF PRESENT ILLNESS
[FreeTextEntry8] : Pt c/o R low back and RLQ abd pain x 6 days. Pt ahs been increasing exercise in past month. Pt took tylenol and heating pad which helped w/ pain. Pain worse w/ standing or walking, improved w/ sitting or laying down.

## 2019-07-31 NOTE — ASSESSMENT
[FreeTextEntry1] : R low back pain - start medrol fiona. tizanidine prn spasms. Possible adverse effects discussed with pt. will refer to PT if no improvement

## 2019-08-05 ENCOUNTER — RX RENEWAL (OUTPATIENT)
Age: 79
End: 2019-08-05

## 2019-09-03 ENCOUNTER — RX RENEWAL (OUTPATIENT)
Age: 79
End: 2019-09-03

## 2019-09-09 ENCOUNTER — FORM ENCOUNTER (OUTPATIENT)
Age: 79
End: 2019-09-09

## 2019-09-10 ENCOUNTER — APPOINTMENT (OUTPATIENT)
Dept: RADIOLOGY | Facility: CLINIC | Age: 79
End: 2019-09-10
Payer: MEDICARE

## 2019-09-10 ENCOUNTER — FORM ENCOUNTER (OUTPATIENT)
Age: 79
End: 2019-09-10

## 2019-09-10 ENCOUNTER — OUTPATIENT (OUTPATIENT)
Dept: OUTPATIENT SERVICES | Facility: HOSPITAL | Age: 79
LOS: 1 days | End: 2019-09-10
Payer: COMMERCIAL

## 2019-09-10 DIAGNOSIS — M54.5 LOW BACK PAIN: ICD-10-CM

## 2019-09-10 DIAGNOSIS — Z98.49 CATARACT EXTRACTION STATUS, UNSPECIFIED EYE: Chronic | ICD-10-CM

## 2019-09-10 PROCEDURE — 72100 X-RAY EXAM L-S SPINE 2/3 VWS: CPT

## 2019-09-10 PROCEDURE — 72100 X-RAY EXAM L-S SPINE 2/3 VWS: CPT | Mod: 26

## 2019-09-11 ENCOUNTER — APPOINTMENT (OUTPATIENT)
Dept: CT IMAGING | Facility: CLINIC | Age: 79
End: 2019-09-11
Payer: MEDICARE

## 2019-09-11 ENCOUNTER — OUTPATIENT (OUTPATIENT)
Dept: OUTPATIENT SERVICES | Facility: HOSPITAL | Age: 79
LOS: 1 days | End: 2019-09-11
Payer: COMMERCIAL

## 2019-09-11 DIAGNOSIS — Z98.49 CATARACT EXTRACTION STATUS, UNSPECIFIED EYE: Chronic | ICD-10-CM

## 2019-09-11 DIAGNOSIS — R91.8 OTHER NONSPECIFIC ABNORMAL FINDING OF LUNG FIELD: ICD-10-CM

## 2019-09-11 PROCEDURE — 71250 CT THORAX DX C-: CPT | Mod: 26

## 2019-09-11 PROCEDURE — 71250 CT THORAX DX C-: CPT

## 2019-09-17 ENCOUNTER — RX RENEWAL (OUTPATIENT)
Age: 79
End: 2019-09-17

## 2019-10-08 ENCOUNTER — RX RENEWAL (OUTPATIENT)
Age: 79
End: 2019-10-08

## 2019-10-14 ENCOUNTER — APPOINTMENT (OUTPATIENT)
Dept: PULMONOLOGY | Facility: CLINIC | Age: 79
End: 2019-10-14
Payer: MEDICARE

## 2019-10-14 VITALS
HEART RATE: 74 BPM | OXYGEN SATURATION: 96 % | RESPIRATION RATE: 16 BRPM | DIASTOLIC BLOOD PRESSURE: 86 MMHG | WEIGHT: 171 LBS | SYSTOLIC BLOOD PRESSURE: 142 MMHG | BODY MASS INDEX: 29.35 KG/M2

## 2019-10-14 DIAGNOSIS — G47.33 OBSTRUCTIVE SLEEP APNEA (ADULT) (PEDIATRIC): ICD-10-CM

## 2019-10-14 DIAGNOSIS — R91.8 OTHER NONSPECIFIC ABNORMAL FINDING OF LUNG FIELD: ICD-10-CM

## 2019-10-14 DIAGNOSIS — J44.1 CHRONIC OBSTRUCTIVE PULMONARY DISEASE WITH (ACUTE) EXACERBATION: ICD-10-CM

## 2019-10-14 DIAGNOSIS — Z87.891 PERSONAL HISTORY OF NICOTINE DEPENDENCE: ICD-10-CM

## 2019-10-14 PROCEDURE — 99214 OFFICE O/P EST MOD 30 MIN: CPT

## 2019-10-14 RX ORDER — AZITHROMYCIN 250 MG/1
250 TABLET, FILM COATED ORAL
Qty: 1 | Refills: 0 | Status: DISCONTINUED | COMMUNITY
Start: 2019-05-11 | End: 2019-10-14

## 2019-10-14 RX ORDER — HYDROCODONE BITARTRATE AND HOMATROPINE METHYLBROMIDE 5; 1.5 MG/5ML; MG/5ML
5-1.5 SYRUP ORAL
Qty: 120 | Refills: 0 | Status: DISCONTINUED | COMMUNITY
Start: 2019-05-20 | End: 2019-10-14

## 2019-10-28 ENCOUNTER — APPOINTMENT (OUTPATIENT)
Dept: FAMILY MEDICINE | Facility: CLINIC | Age: 79
End: 2019-10-28
Payer: MEDICARE

## 2019-10-28 VITALS
TEMPERATURE: 97.5 F | RESPIRATION RATE: 16 BRPM | OXYGEN SATURATION: 97 % | DIASTOLIC BLOOD PRESSURE: 80 MMHG | HEIGHT: 64 IN | HEART RATE: 77 BPM | WEIGHT: 171 LBS | SYSTOLIC BLOOD PRESSURE: 160 MMHG | BODY MASS INDEX: 29.19 KG/M2

## 2019-10-28 LAB
BILIRUB UR QL STRIP: NEGATIVE
GLUCOSE UR-MCNC: NEGATIVE
HCG UR QL: 0.2 EU/DL
HGB UR QL STRIP.AUTO: NORMAL
KETONES UR-MCNC: NEGATIVE
LEUKOCYTE ESTERASE UR QL STRIP: NORMAL
NITRITE UR QL STRIP: NEGATIVE
PH UR STRIP: 5.5
PROT UR STRIP-MCNC: >=300
SP GR UR STRIP: 1.02

## 2019-10-28 PROCEDURE — 81003 URINALYSIS AUTO W/O SCOPE: CPT | Mod: QW

## 2019-10-28 PROCEDURE — 99213 OFFICE O/P EST LOW 20 MIN: CPT | Mod: 25

## 2019-10-28 PROCEDURE — 90662 IIV NO PRSV INCREASED AG IM: CPT

## 2019-10-28 PROCEDURE — G0008: CPT

## 2019-10-28 NOTE — PLAN
[FreeTextEntry1] : UTI- Pt with abnormal ua and symptomatic\par - Followup urine culture\par - Will start abx and pyridium PRN. \par - Advised to increase hydration. f/u if no relief\par Flu shot administered. Pt tolerated well\par

## 2019-10-28 NOTE — REVIEW OF SYSTEMS
[Dysuria] : dysuria [Frequency] : frequency [Fever] : no fever [Chills] : no chills [Fatigue] : no fatigue [Chest Pain] : no chest pain [Palpitations] : no palpitations [Shortness Of Breath] : no shortness of breath [Wheezing] : no wheezing [Cough] : no cough [Abdominal Pain] : no abdominal pain [Nausea] : no nausea [Vomiting] : no vomiting [Incontinence] : no incontinence [Hematuria] : no hematuria [Back Pain] : no back pain [Headache] : no headache

## 2019-10-28 NOTE — PHYSICAL EXAM
[No Acute Distress] : no acute distress [Well-Appearing] : well-appearing [Normal Sclera/Conjunctiva] : normal sclera/conjunctiva [PERRL] : pupils equal round and reactive to light [No Respiratory Distress] : no respiratory distress  [No Accessory Muscle Use] : no accessory muscle use [Normal Rate] : normal rate  [Regular Rhythm] : with a regular rhythm [No CVA Tenderness] : no CVA  tenderness [Normal Affect] : the affect was normal [Normal Insight/Judgement] : insight and judgment were intact

## 2019-10-28 NOTE — HISTORY OF PRESENT ILLNESS
[FreeTextEntry8] : 2 day history of urinary hesitancy, urgency and burning. Has not taken medication.

## 2019-10-30 ENCOUNTER — RX RENEWAL (OUTPATIENT)
Age: 79
End: 2019-10-30

## 2019-10-31 LAB — BACTERIA UR CULT: NORMAL

## 2019-11-08 ENCOUNTER — RX RENEWAL (OUTPATIENT)
Age: 79
End: 2019-11-08

## 2019-11-14 ENCOUNTER — APPOINTMENT (OUTPATIENT)
Dept: FAMILY MEDICINE | Facility: CLINIC | Age: 79
End: 2019-11-14
Payer: MEDICARE

## 2019-11-14 VITALS
TEMPERATURE: 97.5 F | OXYGEN SATURATION: 96 % | WEIGHT: 171 LBS | RESPIRATION RATE: 16 BRPM | BODY MASS INDEX: 29.19 KG/M2 | DIASTOLIC BLOOD PRESSURE: 80 MMHG | HEART RATE: 83 BPM | SYSTOLIC BLOOD PRESSURE: 150 MMHG | HEIGHT: 64 IN

## 2019-11-14 LAB
BILIRUB UR QL STRIP: NEGATIVE
GLUCOSE UR-MCNC: NEGATIVE
HCG UR QL: 0.2 EU/DL
HGB UR QL STRIP.AUTO: NORMAL
KETONES UR-MCNC: NEGATIVE
LEUKOCYTE ESTERASE UR QL STRIP: NORMAL
NITRITE UR QL STRIP: NEGATIVE
PH UR STRIP: 5.5
PROT UR STRIP-MCNC: 30
SP GR UR STRIP: 1.01

## 2019-11-14 PROCEDURE — 81003 URINALYSIS AUTO W/O SCOPE: CPT | Mod: QW

## 2019-11-14 PROCEDURE — 99213 OFFICE O/P EST LOW 20 MIN: CPT | Mod: 25

## 2019-11-14 NOTE — HISTORY OF PRESENT ILLNESS
[FreeTextEntry8] : 79 year old female with pmhx of frequrent UTIs presents with urinary burning and urgency for1 day. She has been getting frequent UTIs. No back pain, fever or chills

## 2019-11-14 NOTE — REVIEW OF SYSTEMS
[Fever] : no fever [Fatigue] : no fatigue [Chills] : no chills [Chest Pain] : no chest pain [Shortness Of Breath] : no shortness of breath [Palpitations] : no palpitations [Wheezing] : no wheezing [Cough] : no cough [Dysuria] : dysuria [Hematuria] : no hematuria [Frequency] : frequency [Headache] : no headache [Back Pain] : no back pain [Dizziness] : no dizziness

## 2019-11-14 NOTE — PHYSICAL EXAM
[No Acute Distress] : no acute distress [Well-Appearing] : well-appearing [Normal Sclera/Conjunctiva] : normal sclera/conjunctiva [PERRL] : pupils equal round and reactive to light [No Respiratory Distress] : no respiratory distress  [No Accessory Muscle Use] : no accessory muscle use [Clear to Auscultation] : lungs were clear to auscultation bilaterally [Normal Rate] : normal rate  [Regular Rhythm] : with a regular rhythm [Normal Affect] : the affect was normal [No CVA Tenderness] : no CVA  tenderness [Normal Insight/Judgement] : insight and judgment were intact

## 2019-11-18 LAB — BACTERIA UR CULT: NORMAL

## 2019-12-02 ENCOUNTER — NON-APPOINTMENT (OUTPATIENT)
Age: 79
End: 2019-12-02

## 2019-12-02 ENCOUNTER — APPOINTMENT (OUTPATIENT)
Dept: FAMILY MEDICINE | Facility: CLINIC | Age: 79
End: 2019-12-02
Payer: MEDICARE

## 2019-12-02 VITALS
WEIGHT: 170 LBS | HEIGHT: 64 IN | HEART RATE: 83 BPM | OXYGEN SATURATION: 97 % | SYSTOLIC BLOOD PRESSURE: 150 MMHG | DIASTOLIC BLOOD PRESSURE: 96 MMHG | BODY MASS INDEX: 29.02 KG/M2

## 2019-12-02 VITALS — DIASTOLIC BLOOD PRESSURE: 84 MMHG | SYSTOLIC BLOOD PRESSURE: 154 MMHG

## 2019-12-02 DIAGNOSIS — R63.5 ABNORMAL WEIGHT GAIN: ICD-10-CM

## 2019-12-02 PROCEDURE — 93000 ELECTROCARDIOGRAM COMPLETE: CPT | Mod: 59

## 2019-12-02 PROCEDURE — G0439: CPT

## 2019-12-02 PROCEDURE — 99214 OFFICE O/P EST MOD 30 MIN: CPT | Mod: 25

## 2019-12-03 PROBLEM — R63.5 WEIGHT GAIN: Status: ACTIVE | Noted: 2019-12-03

## 2019-12-03 RX ORDER — NITROFURANTOIN (MONOHYDRATE/MACROCRYSTALS) 25; 75 MG/1; MG/1
100 CAPSULE ORAL
Qty: 14 | Refills: 0 | Status: COMPLETED | COMMUNITY
Start: 2019-10-28 | End: 2019-12-03

## 2019-12-03 RX ORDER — SULFAMETHOXAZOLE AND TRIMETHOPRIM 800; 160 MG/1; MG/1
800-160 TABLET ORAL
Qty: 14 | Refills: 0 | Status: COMPLETED | COMMUNITY
Start: 2019-11-14 | End: 2019-12-03

## 2019-12-03 RX ORDER — PHENAZOPYRIDINE HYDROCHLORIDE 200 MG/1
200 TABLET ORAL 3 TIMES DAILY
Qty: 15 | Refills: 0 | Status: COMPLETED | COMMUNITY
Start: 2019-11-14 | End: 2019-12-03

## 2019-12-04 NOTE — HISTORY OF PRESENT ILLNESS
[de-identified] : Pt in office for AWV. \par \par Pt c/o chronic back pain for several years, worse in past 6 months. Pain worse w/ doing activities such as housecleaning. Pt uses topical lidocaine prn and tylenol prn.\par Pt c/o itching of b/l ear canals for many months.\par Pt c/o weight gain in past few years. Pt would like to discuss trying a medication to help w/ weight loss. Pt has been feeling well. Denies CP, palpitations, dyspnea, n/v.\par \par Pt has CAD s/p 3 vessel CABG, sees cardio Dr. Salgado regularly. Pt has COPD, sees pulm Dr Mullins. Denies dyspnea except with significant exertion. Otherwise denies CP, palpitations, dyspnea, wheeze, n/v. Pt has chronic anxiety and insomnia. Pt uses xanax prn which has helped control symptoms.\par \par Exsmoker\par ETOH use social\par Drug use denies\par Exercises irregularly\par Diet balanced, high portions\par

## 2019-12-04 NOTE — HEALTH RISK ASSESSMENT
[Very Good] : ~his/her~  mood as very good [] : Yes [30 or more] : 30 or more [Yes] : Yes [1 or 2 (0 pts)] : 1 or 2 (0 points) [2 - 3 times a week (3 pts)] : 2 - 3  times a week (3 points) [Never (0 pts)] : Never (0 points) [No] : In the past 12 months have you used drugs other than those required for medical reasons? No [No falls in past year] : Patient reported no falls in the past year [0] : 1) Little interest or pleasure doing things: Not at all (0) [1] : 2) Feeling down, depressed, or hopeless for several days (1) [Patient declined mammogram] : Patient declined mammogram [Patient declined PAP Smear] : Patient declined PAP Smear [Patient reported bone density results were normal] : Patient reported bone density results were normal [Patient declined colonoscopy] : Patient declined colonoscopy [HIV test declined] : HIV test declined [Hepatitis C test declined] : Hepatitis C test declined [None] : None [With Significant Other] : lives with significant other [] :  [With Family] : lives with family [# Of Children ___] : has [unfilled] children [Fully functional (bathing, dressing, toileting, transferring, walking, feeding)] : Fully functional (bathing, dressing, toileting, transferring, walking, feeding) [Fully functional (using the telephone, shopping, preparing meals, housekeeping, doing laundry, using] : Fully functional and needs no help or supervision to perform IADLs (using the telephone, shopping, preparing meals, housekeeping, doing laundry, using transportation, managing medications and managing finances) [Designated Healthcare Proxy] : Designated healthcare proxy [With Patient/Caregiver] : With Patient/Caregiver [Name: ___] : Health Care Proxy's Name: [unfilled]  [DNR] : DNR [Relationship: ___] : Relationship: [unfilled] [DNI] : DNI [Reports normal functional visual acuity (ie: able to read med bottle)] : Reports normal functional visual acuity [Carbon Monoxide Detector] : carbon monoxide detector [Smoke Detector] : smoke detector [Seat Belt] :  uses seat belt [de-identified] : quit 2007, 1ppd x 30 yrs [YearQuit] : 2007 [Audit-CScore] : 3 [Change in mental status noted] : No change in mental status noted [Language] : denies difficulty with language [Learning/Retaining New Information] : denies difficulty learning/retaining new information [Behavior] : denies difficulty with behavior [Handling Complex Tasks] : denies difficulty handling complex tasks [Spatial Ability and Orientation] : denies difficulty with spatial ability and orientation [Reasoning] : denies difficulty with reasoning [Reports changes in hearing] : Reports no changes in hearing [Reports changes in vision] : Reports no changes in vision [Reports changes in dental health] : Reports no changes in dental health [BoneDensityDate] : 10/18 [de-identified] : lives w/  and 2 sons [AdvancecareDate] : 12/19

## 2019-12-04 NOTE — ASSESSMENT
[FreeTextEntry1] : low back pain - cont tylenol, nsaid, tizanidine prn. pt declines PT at this time\par eczematous otitis externa - start fluocinolone otic oil prn\par CAD, HTN- BP high today, inc lisinopril to 40mg q day, cont statin, lisinopril, metoprolol. f/u cardio\par COPD - cont proair prn, f/u pulm\par weight gain - dwp at length several options. Pt not a good candidate for stimulants such as phentermine. Pt to consider edgardo or topamax, declines at this time due to possible AEs. Advised lifestyle modifications for wt loss\par Healthy diet and regular exercise regimen discussed w/ pt.\par Screening labs ordered\par Any questions call office

## 2019-12-05 LAB
ALBUMIN SERPL ELPH-MCNC: 4.2 G/DL
ALP BLD-CCNC: 91 U/L
ALT SERPL-CCNC: 18 U/L
ANION GAP SERPL CALC-SCNC: 11 MMOL/L
APPEARANCE: CLEAR
AST SERPL-CCNC: 19 U/L
BACTERIA: NEGATIVE
BASOPHILS # BLD AUTO: 0.09 K/UL
BASOPHILS NFR BLD AUTO: 1.2 %
BILIRUB SERPL-MCNC: 0.4 MG/DL
BILIRUBIN URINE: NEGATIVE
BLOOD URINE: NEGATIVE
BUN SERPL-MCNC: 23 MG/DL
CALCIUM SERPL-MCNC: 9.5 MG/DL
CHLORIDE SERPL-SCNC: 103 MMOL/L
CHOLEST SERPL-MCNC: 167 MG/DL
CHOLEST/HDLC SERPL: 3 RATIO
CO2 SERPL-SCNC: 27 MMOL/L
COLOR: NORMAL
CREAT SERPL-MCNC: 0.89 MG/DL
EOSINOPHIL # BLD AUTO: 0.42 K/UL
EOSINOPHIL NFR BLD AUTO: 5.5 %
ESTIMATED AVERAGE GLUCOSE: 126 MG/DL
GLUCOSE QUALITATIVE U: NEGATIVE
GLUCOSE SERPL-MCNC: 100 MG/DL
HBA1C MFR BLD HPLC: 6 %
HCT VFR BLD CALC: 45.1 %
HDLC SERPL-MCNC: 56 MG/DL
HGB BLD-MCNC: 13.7 G/DL
HYALINE CASTS: 0 /LPF
IMM GRANULOCYTES NFR BLD AUTO: 0.5 %
KETONES URINE: NEGATIVE
LDLC SERPL CALC-MCNC: 77 MG/DL
LEUKOCYTE ESTERASE URINE: NEGATIVE
LYMPHOCYTES # BLD AUTO: 2.66 K/UL
LYMPHOCYTES NFR BLD AUTO: 34.9 %
MAN DIFF?: NORMAL
MCHC RBC-ENTMCNC: 26.1 PG
MCHC RBC-ENTMCNC: 30.4 GM/DL
MCV RBC AUTO: 85.9 FL
MICROSCOPIC-UA: NORMAL
MONOCYTES # BLD AUTO: 0.57 K/UL
MONOCYTES NFR BLD AUTO: 7.5 %
NEUTROPHILS # BLD AUTO: 3.84 K/UL
NEUTROPHILS NFR BLD AUTO: 50.4 %
NITRITE URINE: NEGATIVE
PH URINE: 6.5
PLATELET # BLD AUTO: 218 K/UL
POTASSIUM SERPL-SCNC: 4.7 MMOL/L
PROT SERPL-MCNC: 7 G/DL
PROTEIN URINE: NEGATIVE
RBC # BLD: 5.25 M/UL
RBC # FLD: 14.2 %
RED BLOOD CELLS URINE: 4 /HPF
SODIUM SERPL-SCNC: 141 MMOL/L
SPECIFIC GRAVITY URINE: 1.02
SQUAMOUS EPITHELIAL CELLS: 2 /HPF
T4 FREE SERPL-MCNC: 1 NG/DL
TRIGL SERPL-MCNC: 170 MG/DL
TSH SERPL-ACNC: 2.26 UIU/ML
UROBILINOGEN URINE: NORMAL
WBC # FLD AUTO: 7.62 K/UL
WHITE BLOOD CELLS URINE: 3 /HPF

## 2019-12-11 ENCOUNTER — RX RENEWAL (OUTPATIENT)
Age: 79
End: 2019-12-11

## 2020-01-17 ENCOUNTER — APPOINTMENT (OUTPATIENT)
Dept: FAMILY MEDICINE | Facility: CLINIC | Age: 80
End: 2020-01-17

## 2020-01-22 ENCOUNTER — RX RENEWAL (OUTPATIENT)
Age: 80
End: 2020-01-22

## 2020-01-23 ENCOUNTER — APPOINTMENT (OUTPATIENT)
Dept: FAMILY MEDICINE | Facility: CLINIC | Age: 80
End: 2020-01-23
Payer: MEDICARE

## 2020-01-23 VITALS
DIASTOLIC BLOOD PRESSURE: 88 MMHG | BODY MASS INDEX: 29.02 KG/M2 | SYSTOLIC BLOOD PRESSURE: 126 MMHG | WEIGHT: 170 LBS | HEART RATE: 95 BPM | HEIGHT: 64 IN | OXYGEN SATURATION: 97 %

## 2020-01-23 LAB
BILIRUB UR QL STRIP: NEGATIVE
CLARITY UR: CLEAR
COLLECTION METHOD: NORMAL
GLUCOSE UR-MCNC: NEGATIVE
HCG UR QL: 0.2 EU/DL
HGB UR QL STRIP.AUTO: NORMAL
KETONES UR-MCNC: NEGATIVE
LEUKOCYTE ESTERASE UR QL STRIP: NORMAL
NITRITE UR QL STRIP: NEGATIVE
PH UR STRIP: 5.5
PROT UR STRIP-MCNC: NEGATIVE
SP GR UR STRIP: 1.02

## 2020-01-23 PROCEDURE — 81003 URINALYSIS AUTO W/O SCOPE: CPT | Mod: QW

## 2020-01-23 PROCEDURE — 99214 OFFICE O/P EST MOD 30 MIN: CPT | Mod: 25

## 2020-01-24 NOTE — HISTORY OF PRESENT ILLNESS
[de-identified] : Pt for f/u HTN. Pt tolerating inc in medication well.\par Pt c/o urinary hesitancy/urgency/dysuria x 1 day. Denies abd pain, fever, chills.\par Pt c/o chronic low back pain. Pt was seeing chiropractor w/ no improvement. Pt has failed medrol and tizanidine course.

## 2020-01-27 ENCOUNTER — RX RENEWAL (OUTPATIENT)
Age: 80
End: 2020-01-27

## 2020-01-27 LAB — BACTERIA UR CULT: ABNORMAL

## 2020-02-10 ENCOUNTER — APPOINTMENT (OUTPATIENT)
Dept: FAMILY MEDICINE | Facility: CLINIC | Age: 80
End: 2020-02-10

## 2020-02-17 ENCOUNTER — FORM ENCOUNTER (OUTPATIENT)
Age: 80
End: 2020-02-17

## 2020-02-18 ENCOUNTER — OUTPATIENT (OUTPATIENT)
Dept: OUTPATIENT SERVICES | Facility: HOSPITAL | Age: 80
LOS: 1 days | End: 2020-02-18
Payer: MEDICARE

## 2020-02-18 ENCOUNTER — APPOINTMENT (OUTPATIENT)
Dept: MRI IMAGING | Facility: CLINIC | Age: 80
End: 2020-02-18
Payer: MEDICARE

## 2020-02-18 DIAGNOSIS — Z98.49 CATARACT EXTRACTION STATUS, UNSPECIFIED EYE: Chronic | ICD-10-CM

## 2020-02-18 DIAGNOSIS — M54.5 LOW BACK PAIN: ICD-10-CM

## 2020-02-18 DIAGNOSIS — G89.29 OTHER CHRONIC PAIN: ICD-10-CM

## 2020-02-18 PROCEDURE — 72148 MRI LUMBAR SPINE W/O DYE: CPT | Mod: 26

## 2020-02-18 PROCEDURE — 72148 MRI LUMBAR SPINE W/O DYE: CPT

## 2020-03-10 ENCOUNTER — NON-APPOINTMENT (OUTPATIENT)
Age: 80
End: 2020-03-10

## 2020-03-10 ENCOUNTER — APPOINTMENT (OUTPATIENT)
Dept: CARDIOLOGY | Facility: CLINIC | Age: 80
End: 2020-03-10
Payer: MEDICARE

## 2020-03-10 VITALS
SYSTOLIC BLOOD PRESSURE: 142 MMHG | DIASTOLIC BLOOD PRESSURE: 80 MMHG | BODY MASS INDEX: 29.02 KG/M2 | OXYGEN SATURATION: 93 % | WEIGHT: 170 LBS | HEIGHT: 64 IN | HEART RATE: 72 BPM

## 2020-03-10 PROCEDURE — 93000 ELECTROCARDIOGRAM COMPLETE: CPT

## 2020-03-10 PROCEDURE — 99214 OFFICE O/P EST MOD 30 MIN: CPT | Mod: 25

## 2020-03-10 RX ORDER — METHYLPREDNISOLONE 4 MG/1
4 TABLET ORAL
Qty: 1 | Refills: 0 | Status: DISCONTINUED | COMMUNITY
Start: 2019-07-30 | End: 2020-03-10

## 2020-03-10 RX ORDER — ALBUTEROL SULFATE 90 UG/1
108 (90 BASE) AEROSOL, METERED RESPIRATORY (INHALATION)
Qty: 1 | Refills: 0 | Status: DISCONTINUED | COMMUNITY
Start: 2019-05-20 | End: 2020-03-10

## 2020-03-10 RX ORDER — TIZANIDINE 2 MG/1
2 TABLET ORAL
Qty: 30 | Refills: 0 | Status: DISCONTINUED | COMMUNITY
Start: 2019-07-30 | End: 2020-03-10

## 2020-03-10 RX ORDER — PREDNISONE 10 MG/1
10 TABLET ORAL
Qty: 15 | Refills: 0 | Status: DISCONTINUED | COMMUNITY
Start: 2019-05-20 | End: 2020-03-10

## 2020-03-10 RX ORDER — NITROFURANTOIN (MONOHYDRATE/MACROCRYSTALS) 25; 75 MG/1; MG/1
100 CAPSULE ORAL
Qty: 14 | Refills: 0 | Status: DISCONTINUED | COMMUNITY
Start: 2020-01-23 | End: 2020-03-10

## 2020-03-10 RX ORDER — FLUOCINOLONE ACETONIDE 0.11 MG/ML
0.01 OIL AURICULAR (OTIC) DAILY
Qty: 1 | Refills: 1 | Status: DISCONTINUED | COMMUNITY
Start: 2019-12-02 | End: 2020-03-10

## 2020-03-10 NOTE — REVIEW OF SYSTEMS
[see HPI] : see HPI [Negative] : Endocrine [Shortness Of Breath] : no shortness of breath [Dyspnea on exertion] : not dyspnea during exertion [Chest  Pressure] : no chest pressure [Chest Pain] : no chest pain [Lower Ext Edema] : no extremity edema [Palpitations] : no palpitations [Joint Stiffness] : no joint stiffness [Numbness (Hypesthesia)] : no numbness [FreeTextEntry2] : Numbness in left fingers

## 2020-03-10 NOTE — DISCUSSION/SUMMARY
[FreeTextEntry1] : Pt is a 78 y/o F with PMH CAD s/p 3V CABG 12/2016, HTN, HLD who presents today for evaluation prior to epidural injections\par Nuclear stress test 12/2018 fair exercise tolerance, normal myocardial perfusion\par TTE 11/2018 EF 50-55% with inf wall hypokinesis, mild DD, mild TR\par Pt is able to walk a few blocks and 1 flight of stairs without cardiac symptoms\par c/w metoprolol, lisinopril\par c/w ASA and statin for CAD history\par Can hold ASA prior to procedure if needed\par At this time the pt has no signs or symptoms of active ischemia, heart failure, life-threatening arrhythmias, severe valvular pathology.\par VSS\par There are no cardiac contraindications for planned procedure. \par Pt will return in 6 mnths or sooner as needed but I encouraged communication via phone or portal if necessary. \par The described plan was discussed with the pt.  All questions and concerns were addressed to the best of my knowledge.\par  Erythromycin Pregnancy And Lactation Text: This medication is Pregnancy Category B and is considered safe during pregnancy. It is also excreted in breast milk. Detail Level: Detailed Topical Clindamycin Pregnancy And Lactation Text: This medication is Pregnancy Category B and is considered safe during pregnancy. It is unknown if it is excreted in breast milk. Dapsone Pregnancy And Lactation Text: This medication is Pregnancy Category C and is not considered safe during pregnancy or breast feeding. Benzoyl Peroxide Pregnancy And Lactation Text: This medication is Pregnancy Category C. It is unknown if benzoyl peroxide is excreted in breast milk. Erythromycin Counseling:  I discussed with the patient the risks of erythromycin including but not limited to GI upset, allergic reaction, drug rash, diarrhea, increase in liver enzymes, and yeast infections. Doxycycline Pregnancy And Lactation Text: This medication is Pregnancy Category D and not consider safe during pregnancy. It is also excreted in breast milk but is considered safe for shorter treatment courses. High Dose Vitamin A Pregnancy And Lactation Text: High dose vitamin A therapy is contraindicated during pregnancy and breast feeding. Include Pregnancy/Lactation Warning?: No Topical Clindamycin Counseling: Patient counseled that this medication may cause skin irritation or allergic reactions.  In the event of skin irritation, the patient was advised to reduce the amount of the drug applied or use it less frequently.   The patient verbalized understanding of the proper use and possible adverse effects of clindamycin.  All of the patient's questions and concerns were addressed. Birth Control Pills Counseling: Birth Control Pill Counseling: I discussed with the patient the potential side effects of OCPs including but not limited to increased risk of stroke, heart attack, thrombophlebitis, deep venous thrombosis, hepatic adenomas, breast changes, GI upset, headaches, and depression.  The patient verbalized understanding of the proper use and possible adverse effects of OCPs. All of the patient's questions and concerns were addressed. Dapsone Counseling: I discussed with the patient the risks of dapsone including but not limited to hemolytic anemia, agranulocytosis, rashes, methemoglobinemia, kidney failure, peripheral neuropathy, headaches, GI upset, and liver toxicity.  Patients who start dapsone require monitoring including baseline LFTs and weekly CBCs for the first month, then every month thereafter.  The patient verbalized understanding of the proper use and possible adverse effects of dapsone.  All of the patient's questions and concerns were addressed. Detail Level: Zone Tetracycline Counseling: Patient counseled regarding possible photosensitivity and increased risk for sunburn.  Patient instructed to avoid sunlight, if possible.  When exposed to sunlight, patients should wear protective clothing, sunglasses, and sunscreen.  The patient was instructed to call the office immediately if the following severe adverse effects occur:  hearing changes, easy bruising/bleeding, severe headache, or vision changes.  The patient verbalized understanding of the proper use and possible adverse effects of tetracycline.  All of the patient's questions and concerns were addressed. Patient understands to avoid pregnancy while on therapy due to potential birth defects. Isotretinoin Counseling: Patient should get monthly blood tests, not donate blood, not drive at night if vision affected, not share medication, and not undergo elective surgery for 6 months after tx completed. Side effects reviewed, pt to contact office should one occur. Topical Retinoid Pregnancy And Lactation Text: This medication is Pregnancy Category C. It is unknown if this medication is excreted in breast milk. Azithromycin Pregnancy And Lactation Text: This medication is considered safe during pregnancy and is also secreted in breast milk. Tazorac Counseling:  Patient advised that medication is irritating and drying.  Patient may need to apply sparingly and wash off after an hour before eventually leaving it on overnight.  The patient verbalized understanding of the proper use and possible adverse effects of tazorac.  All of the patient's questions and concerns were addressed. Doxycycline Counseling:  Patient counseled regarding possible photosensitivity and increased risk for sunburn.  Patient instructed to avoid sunlight, if possible.  When exposed to sunlight, patients should wear protective clothing, sunglasses, and sunscreen.  The patient was instructed to call the office immediately if the following severe adverse effects occur:  hearing changes, easy bruising/bleeding, severe headache, or vision changes.  The patient verbalized understanding of the proper use and possible adverse effects of doxycycline.  All of the patient's questions and concerns were addressed. Isotretinoin Pregnancy And Lactation Text: This medication is Pregnancy Category X and is considered extremely dangerous during pregnancy. It is unknown if it is excreted in breast milk. Spironolactone Pregnancy And Lactation Text: This medication can cause feminization of the male fetus and should be avoided during pregnancy. The active metabolite is also found in breast milk. Benzoyl Peroxide Counseling: Patient counseled that medicine may cause skin irritation and bleach clothing.  In the event of skin irritation, the patient was advised to reduce the amount of the drug applied or use it less frequently.   The patient verbalized understanding of the proper use and possible adverse effects of benzoyl peroxide.  All of the patient's questions and concerns were addressed. Topical Retinoid counseling:  Patient advised to apply a pea-sized amount only at bedtime and wait 30 minutes after washing their face before applying.  If too drying, patient may add a non-comedogenic moisturizer. The patient verbalized understanding of the proper use and possible adverse effects of retinoids.  All of the patient's questions and concerns were addressed. Minocycline Pregnancy And Lactation Text: This medication is Pregnancy Category D and not consider safe during pregnancy. It is also excreted in breast milk. High Dose Vitamin A Counseling: Side effects reviewed, pt to contact office should one occur. Spironolactone Counseling: Patient advised regarding risks of diarrhea, abdominal pain, hyperkalemia, birth defects (for female patients), liver toxicity and renal toxicity. The patient may need blood work to monitor liver and kidney function and potassium levels while on therapy. The patient verbalized understanding of the proper use and possible adverse effects of spironolactone.  All of the patient's questions and concerns were addressed. Detail Level: Simple Birth Control Pills Pregnancy And Lactation Text: This medication should be avoided if pregnant and for the first 30 days post-partum. Tazorac Pregnancy And Lactation Text: This medication is not safe during pregnancy. It is unknown if this medication is excreted in breast milk. Topical Sulfur Applications Pregnancy And Lactation Text: This medication is Pregnancy Category C and has an unknown safety profile during pregnancy. It is unknown if this topical medication is excreted in breast milk. Minocycline Counseling: Patient advised regarding possible photosensitivity and discoloration of the teeth, skin, lips, tongue and gums.  Patient instructed to avoid sunlight, if possible.  When exposed to sunlight, patients should wear protective clothing, sunglasses, and sunscreen.  The patient was instructed to call the office immediately if the following severe adverse effects occur:  hearing changes, easy bruising/bleeding, severe headache, or vision changes.  The patient verbalized understanding of the proper use and possible adverse effects of minocycline.  All of the patient's questions and concerns were addressed. Azithromycin Counseling:  I discussed with the patient the risks of azithromycin including but not limited to GI upset, allergic reaction, drug rash, diarrhea, and yeast infections. Topical Sulfur Applications Counseling: Topical Sulfur Counseling: Patient counseled that this medication may cause skin irritation or allergic reactions.  In the event of skin irritation, the patient was advised to reduce the amount of the drug applied or use it less frequently.   The patient verbalized understanding of the proper use and possible adverse effects of topical sulfur application.  All of the patient's questions and concerns were addressed. Bactrim Counseling:  I discussed with the patient the risks of sulfa antibiotics including but not limited to GI upset, allergic reaction, drug rash, diarrhea, dizziness, photosensitivity, and yeast infections.  Rarely, more serious reactions can occur including but not limited to aplastic anemia, agranulocytosis, methemoglobinemia, blood dyscrasias, liver or kidney failure, lung infiltrates or desquamative/blistering drug rashes. Bactrim Pregnancy And Lactation Text: This medication is Pregnancy Category D and is known to cause fetal risk.  It is also excreted in breast milk.

## 2020-03-10 NOTE — HISTORY OF PRESENT ILLNESS
[FreeTextEntry1] : 79 year old female with a history of 3 vessel CABG including LIMA to LAD for triple vessel CAD with systolic function 45-50% seen on TTE 01/2017.  Repeat TTE 2018 shows low-normal LV function with mild valvular regurgitation.  She has done well post-operatively and is compliant with meds.  \par She has chronic back pain and is scheduled for epidural injections 3/19/2020 \par Nuclear stress test 12/2018 fair exercise tolerance, normal myocardial perfusion\par TTE 11/2018 EF 50-55% with inf wall hypokinesis, mild DD, mild TR\par Pt is able to walk a few blocks and 1 flight of stairs without cardiac symptoms\par

## 2020-03-11 ENCOUNTER — FORM ENCOUNTER (OUTPATIENT)
Age: 80
End: 2020-03-11

## 2020-03-12 ENCOUNTER — APPOINTMENT (OUTPATIENT)
Dept: CT IMAGING | Facility: CLINIC | Age: 80
End: 2020-03-12
Payer: MEDICARE

## 2020-03-12 ENCOUNTER — OUTPATIENT (OUTPATIENT)
Dept: OUTPATIENT SERVICES | Facility: HOSPITAL | Age: 80
LOS: 1 days | End: 2020-03-12
Payer: MEDICARE

## 2020-03-12 DIAGNOSIS — Z98.49 CATARACT EXTRACTION STATUS, UNSPECIFIED EYE: Chronic | ICD-10-CM

## 2020-03-12 DIAGNOSIS — R91.8 OTHER NONSPECIFIC ABNORMAL FINDING OF LUNG FIELD: ICD-10-CM

## 2020-03-12 PROCEDURE — 71250 CT THORAX DX C-: CPT | Mod: 26

## 2020-03-12 PROCEDURE — 71250 CT THORAX DX C-: CPT

## 2020-06-01 ENCOUNTER — APPOINTMENT (OUTPATIENT)
Dept: FAMILY MEDICINE | Facility: CLINIC | Age: 80
End: 2020-06-01
Payer: MEDICARE

## 2020-06-01 PROCEDURE — 99441: CPT

## 2020-08-14 ENCOUNTER — APPOINTMENT (OUTPATIENT)
Dept: FAMILY MEDICINE | Facility: CLINIC | Age: 80
End: 2020-08-14
Payer: MEDICARE

## 2020-08-14 VITALS
WEIGHT: 177 LBS | OXYGEN SATURATION: 97 % | HEIGHT: 64 IN | DIASTOLIC BLOOD PRESSURE: 84 MMHG | SYSTOLIC BLOOD PRESSURE: 148 MMHG | BODY MASS INDEX: 30.22 KG/M2 | RESPIRATION RATE: 16 BRPM | HEART RATE: 68 BPM

## 2020-08-14 DIAGNOSIS — E66.9 OBESITY, UNSPECIFIED: ICD-10-CM

## 2020-08-14 PROCEDURE — G0439: CPT | Mod: 25

## 2020-08-14 PROCEDURE — 99214 OFFICE O/P EST MOD 30 MIN: CPT | Mod: 25

## 2020-08-15 NOTE — HEALTH RISK ASSESSMENT
[Very Good] : ~his/her~  mood as very good [] : Yes [30 or more] : 30 or more [Yes] : Yes [2 - 3 times a week (3 pts)] : 2 - 3  times a week (3 points) [1 or 2 (0 pts)] : 1 or 2 (0 points) [Never (0 pts)] : Never (0 points) [No] : In the past 12 months have you used drugs other than those required for medical reasons? No [No falls in past year] : Patient reported no falls in the past year [0] : 1) Little interest or pleasure doing things: Not at all (0) [1] : 2) Feeling down, depressed, or hopeless for several days (1) [Patient declined mammogram] : Patient declined mammogram [Patient declined PAP Smear] : Patient declined PAP Smear [Patient reported bone density results were normal] : Patient reported bone density results were normal [Patient declined colonoscopy] : Patient declined colonoscopy [HIV test declined] : HIV test declined [Hepatitis C test declined] : Hepatitis C test declined [None] : None [With Significant Other] : lives with significant other [With Family] : lives with family [# Of Children ___] : has [unfilled] children [] :  [Fully functional (bathing, dressing, toileting, transferring, walking, feeding)] : Fully functional (bathing, dressing, toileting, transferring, walking, feeding) [Fully functional (using the telephone, shopping, preparing meals, housekeeping, doing laundry, using] : Fully functional and needs no help or supervision to perform IADLs (using the telephone, shopping, preparing meals, housekeeping, doing laundry, using transportation, managing medications and managing finances) [Reports normal functional visual acuity (ie: able to read med bottle)] : Reports normal functional visual acuity [Smoke Detector] : smoke detector [Carbon Monoxide Detector] : carbon monoxide detector [Seat Belt] :  uses seat belt [With Patient/Caregiver] : With Patient/Caregiver [Designated Healthcare Proxy] : Designated healthcare proxy [Name: ___] : Health Care Proxy's Name: [unfilled]  [Relationship: ___] : Relationship: [unfilled] [DNR] : DNR [DNI] : DNI [de-identified] : quit 2007, 1ppd x 30 yrs [YearQuit] : 2007 [Audit-CScore] : 3 [[id number]] : 1 [Learning/Retaining New Information] : denies difficulty learning/retaining new information [Behavior] : denies difficulty with behavior [Language] : denies difficulty with language [Change in mental status noted] : No change in mental status noted [Handling Complex Tasks] : denies difficulty handling complex tasks [Reasoning] : denies difficulty with reasoning [Reports changes in hearing] : Reports no changes in hearing [Reports changes in vision] : Reports no changes in vision [Spatial Ability and Orientation] : denies difficulty with spatial ability and orientation [Reports changes in dental health] : Reports no changes in dental health [de-identified] : lives w/  and 2 sons [BoneDensityDate] : 10/18 [AdvancecareDate] : 08/20

## 2020-08-15 NOTE — HISTORY OF PRESENT ILLNESS
[de-identified] : Pt in office for AWV. \par \par Pt c/o chronic back pain for several years. Pt seeing spine pain mgmt Dr. Gamble, has done 4 epidurals which helped minimally. Pain worse w/ doing activities such as housecleaning. Pain improved w/ rest and sitting down. Pt uses topical lidocaine prn and tylenol prn. 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 Pt trying to lose weight by decreasing alcohol intake and food portions but has still gained 7 lbs in past year. Pt would like to discuss trying a medication to help w/ weight loss. Pt has been feeling well. Denies CP, palpitations, dyspnea, n/v.\par \par Pt has COPD, sees pulm Dr Mullins. Pt has hx of pulm nodule which had grown on previous CT chest. Pt advised to go for PET scan by pulmonologist which pt has not yet done because she felt it was not necessary. Denies dyspnea except with significant exertion. Otherwise denies CP, palpitations, dyspnea, wheeze, n/v.\par \par Pt has CAD s/p 3 vessel CABG, sees cardio Dr. Salgado regularly. \par \par Pt has chronic anxiety and insomnia. Pt uses xanax prn which has helped control symptoms.\par \par Exsmoker\par ETOH use social 1-2 glass wine every 3 days\par Drug use denies\par Exercises irregularly\par Diet balanced, high portions\par

## 2020-08-15 NOTE — ASSESSMENT
[FreeTextEntry1] : enlarging pulm nodule, hx COPD - f/u with pulm. discussed need for pt to f/u for PET scan as advised by pulm. Pt understands on repeat back and agrees to f/u with pulm\par CAD, HTN- BP high today,add on amlodipine to regimen. refill lipitor, cont lisinopril, metoprolol. f/u cardio. f/u in 2-3 months to recheck BP\par obesity, weight gain - dwp at length several options. Pt not a good candidate for stimulants such as phentermine. Pt to start topiramate to help w/ cravings. Possible adverse effects discussed with pt. f/u in 3 mos to monitor weight\par low back pain - f/u with pain mgmt. advised wt loss. pt declines PT\par Healthy diet and regular exercise regimen discussed w/ pt.\par Screening labs ordered\par Any questions call office

## 2020-08-17 LAB
ALBUMIN SERPL ELPH-MCNC: 4.3 G/DL
ALP BLD-CCNC: 88 U/L
ALT SERPL-CCNC: 21 U/L
ANION GAP SERPL CALC-SCNC: 11 MMOL/L
APPEARANCE: CLEAR
AST SERPL-CCNC: 14 U/L
BACTERIA: NEGATIVE
BASOPHILS # BLD AUTO: 0.07 K/UL
BASOPHILS NFR BLD AUTO: 0.8 %
BILIRUB SERPL-MCNC: 0.2 MG/DL
BILIRUBIN URINE: NEGATIVE
BLOOD URINE: NEGATIVE
BUN SERPL-MCNC: 26 MG/DL
CALCIUM SERPL-MCNC: 9 MG/DL
CHLORIDE SERPL-SCNC: 108 MMOL/L
CHOLEST SERPL-MCNC: 149 MG/DL
CHOLEST/HDLC SERPL: 3.1 RATIO
CO2 SERPL-SCNC: 25 MMOL/L
COLOR: YELLOW
CREAT SERPL-MCNC: 1.03 MG/DL
EOSINOPHIL # BLD AUTO: 0.19 K/UL
EOSINOPHIL NFR BLD AUTO: 2.2 %
ESTIMATED AVERAGE GLUCOSE: 126 MG/DL
GLUCOSE QUALITATIVE U: NEGATIVE
GLUCOSE SERPL-MCNC: 82 MG/DL
HBA1C MFR BLD HPLC: 6 %
HCT VFR BLD CALC: 47.9 %
HDLC SERPL-MCNC: 48 MG/DL
HGB BLD-MCNC: 13.7 G/DL
HYALINE CASTS: 4 /LPF
IMM GRANULOCYTES NFR BLD AUTO: 0.5 %
KETONES URINE: NEGATIVE
LDLC SERPL CALC-MCNC: 61 MG/DL
LEUKOCYTE ESTERASE URINE: ABNORMAL
LYMPHOCYTES # BLD AUTO: 3.63 K/UL
LYMPHOCYTES NFR BLD AUTO: 41.9 %
MAN DIFF?: NORMAL
MCHC RBC-ENTMCNC: 25.8 PG
MCHC RBC-ENTMCNC: 28.6 GM/DL
MCV RBC AUTO: 90.2 FL
MICROSCOPIC-UA: NORMAL
MONOCYTES # BLD AUTO: 0.7 K/UL
MONOCYTES NFR BLD AUTO: 8.1 %
NEUTROPHILS # BLD AUTO: 4.03 K/UL
NEUTROPHILS NFR BLD AUTO: 46.5 %
NITRITE URINE: NEGATIVE
PH URINE: 6
PLATELET # BLD AUTO: 249 K/UL
POTASSIUM SERPL-SCNC: 4.4 MMOL/L
PROT SERPL-MCNC: 6.8 G/DL
PROTEIN URINE: NORMAL
RBC # BLD: 5.31 M/UL
RBC # FLD: 14.8 %
RED BLOOD CELLS URINE: 3 /HPF
SODIUM SERPL-SCNC: 144 MMOL/L
SPECIFIC GRAVITY URINE: 1.03
SQUAMOUS EPITHELIAL CELLS: 7 /HPF
T4 FREE SERPL-MCNC: 1.1 NG/DL
TRIGL SERPL-MCNC: 197 MG/DL
TSH SERPL-ACNC: 4.03 UIU/ML
UROBILINOGEN URINE: NORMAL
WBC # FLD AUTO: 8.66 K/UL
WHITE BLOOD CELLS URINE: 8 /HPF

## 2020-09-28 ENCOUNTER — APPOINTMENT (OUTPATIENT)
Dept: CARDIOLOGY | Facility: CLINIC | Age: 80
End: 2020-09-28
Payer: MEDICARE

## 2020-09-28 ENCOUNTER — NON-APPOINTMENT (OUTPATIENT)
Age: 80
End: 2020-09-28

## 2020-09-28 VITALS
HEIGHT: 64 IN | HEART RATE: 72 BPM | DIASTOLIC BLOOD PRESSURE: 86 MMHG | BODY MASS INDEX: 29.02 KG/M2 | OXYGEN SATURATION: 95 % | WEIGHT: 170 LBS | SYSTOLIC BLOOD PRESSURE: 146 MMHG

## 2020-09-28 PROCEDURE — 93000 ELECTROCARDIOGRAM COMPLETE: CPT

## 2020-09-28 PROCEDURE — 99214 OFFICE O/P EST MOD 30 MIN: CPT | Mod: 25

## 2020-09-28 NOTE — HISTORY OF PRESENT ILLNESS
[FreeTextEntry1] : 79 year old female with a history of 3 vessel CABG including LIMA to LAD for triple vessel CAD with systolic function 45-50% seen on TTE 01/2017.  Repeat TTE 2018 shows low-normal LV function with mild valvular regurgitation.  She has done well post-operatively and is compliant with meds. \par She denies CP, SOB, diaphoresis, palpitations, dizziness, syncope, LE edema, PND.  \par She has chronic back pain and is scheduled for medial branch block 10/08/2020 \par Nuclear stress test 12/2018 fair exercise tolerance, normal myocardial perfusion\par TTE 11/2018 EF 50-55% with inf wall hypokinesis, mild DD, mild TR\par Exercises 3x/week - isometric, no cardio and 1 flight of stairs without cardiac symptoms\par \par

## 2020-09-28 NOTE — DISCUSSION/SUMMARY
[FreeTextEntry1] : Pt is a 78 y/o F with PMH CAD s/p 3V CABG 12/2016, HTN, HLD who presents today for evaluation prior to medial branch block\par Nuclear stress test 12/2018 fair exercise tolerance, normal myocardial perfusion\par TTE 11/2018 EF 50-55% with inf wall hypokinesis, mild DD, mild TR\par Pt remains active\par c/w metoprolol, lisinopril\par c/w ASA and statin for CAD history\par Can hold ASA prior to procedure if needed\par At this time the pt has no signs or symptoms of active ischemia, heart failure, life-threatening arrhythmias, severe valvular pathology.\par VSS\par There are no cardiac contraindications for planned procedure. \par She is due for TTE and stress test at this time as well which can be done after procedure\par The described plan was discussed with the pt.  All questions and concerns were addressed to the best of my knowledge.\par

## 2020-10-06 ENCOUNTER — APPOINTMENT (OUTPATIENT)
Dept: FAMILY MEDICINE | Facility: CLINIC | Age: 80
End: 2020-10-06
Payer: MEDICARE

## 2020-10-06 PROCEDURE — 90686 IIV4 VACC NO PRSV 0.5 ML IM: CPT

## 2020-10-06 PROCEDURE — G0008: CPT

## 2020-10-30 ENCOUNTER — OUTPATIENT (OUTPATIENT)
Dept: OUTPATIENT SERVICES | Facility: HOSPITAL | Age: 80
LOS: 1 days | End: 2020-10-30

## 2020-10-30 ENCOUNTER — APPOINTMENT (OUTPATIENT)
Dept: NUCLEAR MEDICINE | Facility: CLINIC | Age: 80
End: 2020-10-30
Payer: MEDICARE

## 2020-10-30 DIAGNOSIS — Z98.49 CATARACT EXTRACTION STATUS, UNSPECIFIED EYE: Chronic | ICD-10-CM

## 2020-10-30 DIAGNOSIS — R91.1 SOLITARY PULMONARY NODULE: ICD-10-CM

## 2020-10-30 PROCEDURE — 78815 PET IMAGE W/CT SKULL-THIGH: CPT | Mod: 26,PI

## 2020-11-03 ENCOUNTER — APPOINTMENT (OUTPATIENT)
Dept: FAMILY MEDICINE | Facility: CLINIC | Age: 80
End: 2020-11-03
Payer: MEDICARE

## 2020-11-03 VITALS
WEIGHT: 168 LBS | BODY MASS INDEX: 28.68 KG/M2 | DIASTOLIC BLOOD PRESSURE: 76 MMHG | OXYGEN SATURATION: 98 % | HEART RATE: 64 BPM | HEIGHT: 64 IN | SYSTOLIC BLOOD PRESSURE: 130 MMHG | TEMPERATURE: 97.6 F

## 2020-11-03 PROCEDURE — 99213 OFFICE O/P EST LOW 20 MIN: CPT

## 2020-11-03 PROCEDURE — 99072 ADDL SUPL MATRL&STAF TM PHE: CPT

## 2020-12-24 ENCOUNTER — TRANSCRIPTION ENCOUNTER (OUTPATIENT)
Age: 80
End: 2020-12-24

## 2021-01-25 ENCOUNTER — APPOINTMENT (OUTPATIENT)
Dept: FAMILY MEDICINE | Facility: CLINIC | Age: 81
End: 2021-01-25
Payer: MEDICARE

## 2021-01-25 VITALS
OXYGEN SATURATION: 95 % | WEIGHT: 169 LBS | HEIGHT: 64 IN | DIASTOLIC BLOOD PRESSURE: 80 MMHG | SYSTOLIC BLOOD PRESSURE: 154 MMHG | TEMPERATURE: 97.2 F | BODY MASS INDEX: 28.85 KG/M2 | HEART RATE: 67 BPM

## 2021-01-25 DIAGNOSIS — R94.8 ABNORMAL RESULTS OF FUNCTION STUDIES OF OTHER ORGANS AND SYSTEMS: ICD-10-CM

## 2021-01-25 PROCEDURE — 99215 OFFICE O/P EST HI 40 MIN: CPT | Mod: 25

## 2021-01-25 PROCEDURE — 99072 ADDL SUPL MATRL&STAF TM PHE: CPT

## 2021-01-25 PROCEDURE — 36415 COLL VENOUS BLD VENIPUNCTURE: CPT

## 2021-01-26 ENCOUNTER — RX RENEWAL (OUTPATIENT)
Age: 81
End: 2021-01-26

## 2021-01-27 RX ORDER — AMLODIPINE BESYLATE 2.5 MG/1
2.5 TABLET ORAL
Qty: 90 | Refills: 0 | Status: COMPLETED | COMMUNITY
Start: 2020-11-24

## 2021-01-27 RX ORDER — METHOCARBAMOL 500 MG/1
500 TABLET, FILM COATED ORAL
Qty: 90 | Refills: 0 | Status: COMPLETED | COMMUNITY
Start: 2020-11-27

## 2021-01-27 NOTE — HISTORY OF PRESENT ILLNESS
[de-identified] : Pt c/o chronic back pain for several years. Pt seeing spine interventional pain mgmt Dr. Gamble, has done 6 epidurals which helped minimally. Pain worse w/ doing activities such as housecleaning or being active. Pain improved w/ rest and sitting down. Pt uses topical lidocaine prn and tylenol prn. 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 Pt has hx of pulm nodule which had grown on previous CT chest. PET scan was done which showed unchanged 1cm lung nodule, unchanged adrenal nodule, mildly increased anorectal FDG uptake. Pt was told by pulm to f/u with a "specialist" which she was not compliant with doing. Pt denies dyspnea except with significant exertion. Otherwise denies CP, palpitations, dyspnea, wheeze, n/v.\par \par Pt has CAD s/p 3 vessel CABG and cardiomyopathy, sees cardio Dr. Salgado regularly. \par \par Pt has chronic anxiety and insomnia. Pt uses xanax prn which has helped control symptoms.

## 2021-01-27 NOTE — ASSESSMENT
[FreeTextEntry1] : COPD, hx pulm nodule - f/u CT chest ordered to monitor nodule. f/u w/ pulm\par CAD, cardiomyopathy, HTN- conditions stable but BP high today, inc amlodipine 5mg q day. refill lipitor, cont lisinopril, metoprolol. f/u cardio. f/u in 2-3 months to recheck BP\par low back pain - flexeril prn muscle spasms. Possible adverse effects discussed with pt. f/u with pain mgmt, ortho spine. advised wt loss. pt declines PT, pt to consider chiropractor\par abnml PET scan - refer to colorectal to evaluate inc anorectal uptake\par anxiety, insomnia - refill xanax prn.  checked \par \par I have spent 40 minutes of time during this encounter including face to face time with patient and review of chart. >50% of time was spent counseling and/or coordinating care for above problems.

## 2021-01-28 LAB
ALBUMIN SERPL ELPH-MCNC: 4.4 G/DL
ALP BLD-CCNC: 125 U/L
ALT SERPL-CCNC: 15 U/L
ANION GAP SERPL CALC-SCNC: 17 MMOL/L
AST SERPL-CCNC: 16 U/L
BILIRUB SERPL-MCNC: 0.3 MG/DL
BUN SERPL-MCNC: 19 MG/DL
CALCIUM SERPL-MCNC: 9.7 MG/DL
CHLORIDE SERPL-SCNC: 102 MMOL/L
CHOLEST SERPL-MCNC: 179 MG/DL
CO2 SERPL-SCNC: 24 MMOL/L
CREAT SERPL-MCNC: 1.07 MG/DL
GLUCOSE SERPL-MCNC: 98 MG/DL
HDLC SERPL-MCNC: 54 MG/DL
LDLC SERPL CALC-MCNC: 70 MG/DL
NONHDLC SERPL-MCNC: 125 MG/DL
POTASSIUM SERPL-SCNC: 5.5 MMOL/L
PROT SERPL-MCNC: 7.6 G/DL
SODIUM SERPL-SCNC: 143 MMOL/L
TRIGL SERPL-MCNC: 276 MG/DL

## 2021-02-08 ENCOUNTER — APPOINTMENT (OUTPATIENT)
Dept: PHYSICAL MEDICINE AND REHAB | Facility: CLINIC | Age: 81
End: 2021-02-08
Payer: MEDICARE

## 2021-02-08 VITALS
RESPIRATION RATE: 14 BRPM | HEIGHT: 64 IN | BODY MASS INDEX: 28.85 KG/M2 | OXYGEN SATURATION: 97 % | HEART RATE: 87 BPM | DIASTOLIC BLOOD PRESSURE: 87 MMHG | WEIGHT: 169 LBS | SYSTOLIC BLOOD PRESSURE: 157 MMHG | TEMPERATURE: 97.2 F

## 2021-02-08 DIAGNOSIS — Z78.9 OTHER SPECIFIED HEALTH STATUS: ICD-10-CM

## 2021-02-08 PROCEDURE — 99072 ADDL SUPL MATRL&STAF TM PHE: CPT

## 2021-02-08 PROCEDURE — 99204 OFFICE O/P NEW MOD 45 MIN: CPT

## 2021-02-08 RX ORDER — CIPROFLOXACIN HYDROCHLORIDE 500 MG/1
500 TABLET, FILM COATED ORAL
Qty: 6 | Refills: 0 | Status: DISCONTINUED | COMMUNITY
Start: 2017-06-22 | End: 2021-02-08

## 2021-02-08 RX ORDER — TOPIRAMATE 25 MG/1
25 TABLET, FILM COATED ORAL
Qty: 90 | Refills: 0 | Status: DISCONTINUED | COMMUNITY
Start: 2020-08-14 | End: 2021-02-08

## 2021-02-08 RX ORDER — EPINEPHRINE 0.3 MG/.3ML
0.3 INJECTION INTRAMUSCULAR
Qty: 1 | Refills: 2 | Status: DISCONTINUED | COMMUNITY
Start: 2019-08-05 | End: 2021-02-08

## 2021-02-08 NOTE — PHYSICAL EXAM
[FreeTextEntry1] : PE:\par Constitutional: In NAD, calm and cooperative\par HEENT: NCAT, Anicteric sclera, no lid-lag\par Cardio: Extremities appear pink and well perfused, no peripheral edema\par Respiratory: Normal respiratory effort on room air, no accessory muscle use\par Skin: no rashes seen on exposed skin, no visible abrasions\par Psych: Normal affect, intact judgment and insight\par Neuro: Awake, alert and oriented x 3, see below for focused neurological exam\par MSK (Back)\par 	Inspection: no gross swelling identified\par 	Palpation: Mild tenderness of the bilateral lower lumbar paraspinals\par 	ROM: Pain at end lumbar flexion>extension\par 	Strength: 5/5 strength in bilateral lower extremities\par 	Reflexes: 1+ Patella reflex bilaterally, 1+ Achilles reflex bilaterally, negative clonus bilaterally\par 	Sensation: Intact to light touch in bilateral lower extremities\par Special tests:\par SLR:negative bilaterally\par STEWART:negative bilaterally\par FADIR: negative bilaterally\par Facet loading: negative bilaterally

## 2021-02-08 NOTE — HISTORY OF PRESENT ILLNESS
[FreeTextEntry1] : Ms. SIMEON CABRAL is a 80 year old female who presents with low back pain. \par \par Location:across low back back in band like fashion\par Onset:3+ years ago, gradually, no specific event\par Provocation/Palliative:Worse with activities, improved with rest. \par Quality:Aching\par Radiation:None\par Severity:9/10\par Timing:Not improving with time\par \par Denies any associated numbness. Denies any associated leg weakness. Denies any loss of bowel/bladder control or any groin numbness.\par Previous medications trialed:Tylenol/Lidocaine patch\par Previous procedures relevant to complaint:Multiple ESIs in past without sig relief, Dr. Pierre.\par Conservative therapy tried?:Good response to chiropractic in past\par \par Of note, patient has followed with Dr. Pierre, pain management, and undergone multiple injections all without any relief as per patient.

## 2021-02-08 NOTE — ASSESSMENT
[FreeTextEntry1] : Ms. SIMEON CABRAL is a 80 year old female who presents with chronic axial low back pain, consistent with chronic myofascial pain with possible spondylosis component. Denies any red flag signs. Patient has seen an outside pain management doctor without any relief from injection therapy. Will recommend:\par - Patient says she does not believe in PT but says she has had good relief from chiropractic therapy in past. I told her that she can try chiropractic therapy for now. \par - Patient will continue Tylenol PRN as she is to avoid NSAIDs due to cardiac disease\par - Patient does not wish to see a surgeon at this time as she would like to try the chiropractor first. Patient educated on red flag signs including any changes to his bowel/bladder control, groin numbness or new weakness. Patient knows to seek immediate attention by calling 911 or going to nearest ER if these symptoms appear. \par \par RTC after a few sessions of chiropractic therapy. Patient in agreement with plan. \par \par I spent a total of 45 minutes with this patient including documentation.

## 2021-02-08 NOTE — DATA REVIEWED
[FreeTextEntry1] : MRI L Spine reviewed: mutlilevel spondylosis\par \par INTERPRETATION: \par LUMBOSACRAL SPINE MRI \par \par CLINICAL INFORMATION: Chronic low back pain. \par TECHNIQUE: Multiplanar, multisequence MR imaging was obtained of the \par lumbosacral spine. \par COMPARISON: Lumbar spine radiographs 10 September 2019. \par \par FINDINGS: \par \par SPINAL SEGMENTATION: The study assumes 5 non-rib bearing lumbar type \par vertebral bodies. \par SPINAL ALIGNMENT: Leftward curvature of the lumbar spine centered at the \par level of L2-3. Minimal anterolisthesis of L3 on L4 and L4 on L5. \par MARROW: Vertebral body heights are maintained. Hemangioma is seen in the L4 \par vertebral body. \par DISTAL CORD AND CONUS: Conus terminates at the level of L1. \par DISC SPACES: Central annular fissure at the L4-5 level. Loss of \par intervertebral disc height at the L3-4 and L4-5 levels. \par PARASPINAL MUSCLE AND SOFT TISSUES: Mild atrophy of the posterior \par paraspinous musculature. \par INTRAABDOMINAL/INTRAPELVIC SOFT TISSUES: Bilateral parapelvic cyst formation. \par \par DISC LEVEL EVALUATION: \par \par T12/L1: Seen only on the sagittal view. No central stenosis or neural \par foraminal narrowing. \par L1/L2: Small right foraminal disc protrusion causes mild right neural \par foraminal narrowing. No central stenosis or left neural foraminal narrowing. \par L2/L3: No central stenosis or neural foraminal narrowing. \par L3/L4: Disc bulge and bilateral facet arthropathy causes mild central \par stenosis. No neural foraminal narrowing. \par L4/L5: Central annular fissure. Disc bulge with facet arthropathy and \par infolding of ligamentum flavum causes mild central stenosis. No neural \par foraminal narrowing. \par L5/S1: Disc bulge with mild facet arthropathy. No central stenosis or neural \par foraminal narrowing. \par \par IMPRESSION: \par 1. Multilevel spondylosis of the lumbar spine, as described above. \par 2. Minimal anterolisthesis of L3 on L4 and L4 on L5. \par 3. At L1-2, a small right foraminal disc protrusion causes mild right \par neural foraminal narrowing. \par 4. At L3-4 there is mild central stenosis. \par 5. At L4-5 there is a central annular fissure with mild central stenosis. \par \par \par \par \par \par \par \par \par SKIP LALA M.D., ATTENDING RADIOLOGIST \par This document has been electronically signed. Feb 20 2020 2:28PM \par \par \par \par \par \par \par \par \par \par    \par \par \par \par \par \par \par \par \par \par \par \par \par \par \par   \par  \par  \par \par \par Notes

## 2021-02-24 ENCOUNTER — RX RENEWAL (OUTPATIENT)
Age: 81
End: 2021-02-24

## 2021-03-09 ENCOUNTER — APPOINTMENT (OUTPATIENT)
Dept: CT IMAGING | Facility: CLINIC | Age: 81
End: 2021-03-09

## 2021-04-02 ENCOUNTER — APPOINTMENT (OUTPATIENT)
Dept: FAMILY MEDICINE | Facility: CLINIC | Age: 81
End: 2021-04-02
Payer: MEDICARE

## 2021-04-02 PROCEDURE — 99441: CPT

## 2021-04-08 ENCOUNTER — APPOINTMENT (OUTPATIENT)
Dept: ORTHOPEDIC SURGERY | Facility: CLINIC | Age: 81
End: 2021-04-08
Payer: MEDICARE

## 2021-04-08 VITALS — TEMPERATURE: 98.3 F

## 2021-04-08 VITALS
HEIGHT: 64 IN | HEART RATE: 85 BPM | SYSTOLIC BLOOD PRESSURE: 148 MMHG | WEIGHT: 169 LBS | BODY MASS INDEX: 28.85 KG/M2 | DIASTOLIC BLOOD PRESSURE: 88 MMHG

## 2021-04-08 DIAGNOSIS — Z80.9 FAMILY HISTORY OF MALIGNANT NEOPLASM, UNSPECIFIED: ICD-10-CM

## 2021-04-08 PROCEDURE — 99205 OFFICE O/P NEW HI 60 MIN: CPT

## 2021-04-08 PROCEDURE — 99072 ADDL SUPL MATRL&STAF TM PHE: CPT

## 2021-04-08 NOTE — HISTORY OF PRESENT ILLNESS
[de-identified] : 80 year old F presents for an initial evaluation of lower back pain with BL LE pain and heaviness after standing for several minutes. She feels this pain and issues ambulating have been worsening over the last year. She sees Dr. Yi for injections. She sees chiropractics for traction based treatments.  [Ataxia] : no ataxia [Incontinence] : no incontinence [Loss of Dexterity] : good dexterity [Urinary Ret.] : no urinary retention

## 2021-04-08 NOTE — PHYSICAL EXAM
[Poor Appearance] : well-appearing [Acute Distress] : not in acute distress [Obese] : not obese [de-identified] : CONSTITUTIONAL: The patient is a very pleasant individual who is well-nourished and who appears stated age.\par PSYCHIATRIC: The patient is alert and oriented X 3 and in no apparent distress, and participates with orthopedic evaluation well.\par HEAD: Atraumatic and is nonsyndromic in appearance.\par EENT: No visible thyromegaly, EOMI.\par RESPIRATORY: Respiratory rate is regular, not dyspneic on examination.\par LYMPHATICS: There is no inguinal lymphadenopathy\par INTEGUMENTARY: Skin is clean, dry, and intact about the bilateral lower extremities and lumbar spine.\par VASCULAR: There is brisk capillary refill about the bilateral lower extremities.\par NEUROLOGIC: There are no pathologic reflexes. There is no decrease in sensation of the bilateral lower extremities on Wartenberg pinwheel examination. Deep tendon reflexes are well maintained at 2+/4 of the bilateral lower extremities and are symmetric.\par MUSCULOSKELETAL: There is no visible muscular atrophy. Manual motor strength is well maintained in the bilateral lower extremities. Range of motion of lumbar spine is well maintained. The patient ambulates in a non-myelopathic manner. Negative tension sign and straight leg raise bilaterally. Quad extension, ankle dorsiflexion, EHL, plantar flexion, and ankle eversion are well preserved. Normal secondary orthopaedic exam of bilateral hips, greater trochanteric area, knees and ankles.  [de-identified] : Lumbar flexion and extension Xrays of the lumbar spine taken 03- at [facility] radiology demonstrates a mild grade 1 spondylolisthesis at L3-L4 and L4-L5. \par \par MRI of the lumbar spine taken at [facility] Radiology on 03- demonstrates lumbar degenerative disc disease at all levels which is age appropriate, there are subtle spondylolisthesis of L3-L4 and L4-L5.

## 2021-04-08 NOTE — ADDENDUM
[FreeTextEntry1] : Documented by Davis Zazueta acting as a scribe for Richelle Taveras on 12/03/2020. All medical record entries made by the Scribe were at my, Richelle Taveras, direction and personally dictated by me on 12/03/2020 . I have reviewed the chart and agree that the record accurately reflects my personal performance of the history, physical exam, assessment and plan. I have also personally directed, reviewed, and agreed with the chart.

## 2021-04-08 NOTE — DISCUSSION/SUMMARY
[de-identified] : Patient and I discussed PT at length, while she states she is not interested I encouraged following through with the PT referral. We discussed a fusion surgery to address her BL LE heaviness with walking for less than 2 minutes. Patient wishes to discuss the surgical option with her family. I advised the patient on antiinflammatory use. Patient was instructed to start home exercises, core strengthening and a sheet was provided. I have recommended that the patient continue with a conservative treatment plan such as chiropractics, however conservative wont correct the mechanical issues which are causing her neurogenic claudication. Patient to follow up on a PRN basis to discuss surgery. \par \par Patient with multiple medical comorbidity increasing the risk of perioperative and postoperative complications as well as diminished spine outcomes as per the current medical literature. These include but are not limited to obesity, anxiety/depression, cardiac illness, kidney disease, peripheral vascular disease, history of cancer, COPD, dysmetabolic syndrome including but not limited to diabetes, hyperlipidemia, hypertension. Patient is being referred back to primary care provider for medical optimization, as well as other appropriate specialists as needed for optimization prior to spine surgery. \par \par \par A long discussion was had with the patient regarding surgical plan. Patient is aware that surgery is elective in nature and is choosing to proceed with surgery. Risks, benefits, alternatives were discussed and all questions, comments and concerns were answered to the patient's satisfaction. The statistical probability of improvement was discussed at length as well as post surgical course.  Literature was provided regarding the specific type of surgery as well as a written surgical consent which the patient will need to sign and return to the office prior to surgical date.  \par If patient is a smoker, discontinuation of smoking was advised and must be accomplished 6-8 weeks prior to surgery date.  Patient was advised that help with quitting smoking is available through [place] [employer]uit Line and phone number/website was provided, or patient can ask assistance from primary care provider.  Elective surgery will not be performed unless patient complies with this policy. \par

## 2021-04-27 ENCOUNTER — APPOINTMENT (OUTPATIENT)
Dept: COLORECTAL SURGERY | Facility: CLINIC | Age: 81
End: 2021-04-27
Payer: MEDICARE

## 2021-04-27 PROCEDURE — 99072 ADDL SUPL MATRL&STAF TM PHE: CPT

## 2021-04-27 PROCEDURE — 99204 OFFICE O/P NEW MOD 45 MIN: CPT

## 2021-05-03 NOTE — CONSULT LETTER
[Dear  ___] : Dear  [unfilled], [Consult Letter:] : I had the pleasure of evaluating your patient, [unfilled]. [Please see my note below.] : Please see my note below. [Consult Closing:] : Thank you very much for allowing me to participate in the care of this patient.  If you have any questions, please do not hesitate to contact me. [Sincerely,] : Sincerely, [FreeTextEntry3] : Kem Jensen MD

## 2021-05-03 NOTE — REVIEW OF SYSTEMS
[As Noted in HPI] : as noted in HPI [Feeling Poorly] : feeling poorly [Feeling Tired] : feeling tired [Melena] : melmichelle [Negative] : Heme/Lymph

## 2021-05-03 NOTE — HISTORY OF PRESENT ILLNESS
[FreeTextEntry1] : consultation requested by Dr. Pizano for rectal mass/poilyp concerning for cancer. 80 year old female with multiple co-morbidities including COPD and cardiomyopathy found on recent colonoscopy to have a large flat rectal mass/polyp with path showing tubbulovilous adenoma. she does have worsening of symptoms for bleeding and mucus discharge.

## 2021-05-03 NOTE — PHYSICAL EXAM
[Abdomen Tenderness] : ~T No ~M abdominal tenderness [Abdomen Masses] : No abdominal masses [Tender] : nontender [None] : no [___ Posterior] : a [unfilled] ~Ucm rectal mass was present posteriorly [JVD] : no jugular venous distention  [Normal Breath Sounds] : Normal breath sounds [Normal Heart Sounds] : normal heart sounds [Normal Rate and Rhythm] : normal rate and rhythm [Alert] : alert [No Rash or Lesion] : No rash or lesion [Oriented to Person] : oriented to person [Oriented to Place] : oriented to place [Oriented to Time] : oriented to time [Calm] : calm [de-identified] : looks well NAD [de-identified] : abel almeida [de-identified] : moves all 4

## 2021-05-03 NOTE — ASSESSMENT
[FreeTextEntry1] : 80 year old female with multiple co-morbidities including COPD, cardiomyopathy with large flat rectal mass with bleeding. mass is suspect for possible cancer. recommend MRI of rectum, CT chest abdomen and pelvis, CEA. transanal excisioon of rectal mass. risk and benefits explained including bleeding infections, sepsis, need for future surgery, pneumonia, mi, stroke and death.

## 2021-05-05 ENCOUNTER — APPOINTMENT (OUTPATIENT)
Dept: CT IMAGING | Facility: CLINIC | Age: 81
End: 2021-05-05
Payer: MEDICARE

## 2021-05-05 ENCOUNTER — RESULT REVIEW (OUTPATIENT)
Age: 81
End: 2021-05-05

## 2021-05-05 ENCOUNTER — OUTPATIENT (OUTPATIENT)
Dept: OUTPATIENT SERVICES | Facility: HOSPITAL | Age: 81
LOS: 1 days | End: 2021-05-05
Payer: MEDICARE

## 2021-05-05 ENCOUNTER — APPOINTMENT (OUTPATIENT)
Dept: MRI IMAGING | Facility: CLINIC | Age: 81
End: 2021-05-05
Payer: MEDICARE

## 2021-05-05 DIAGNOSIS — Z98.49 CATARACT EXTRACTION STATUS, UNSPECIFIED EYE: Chronic | ICD-10-CM

## 2021-05-05 DIAGNOSIS — K62.89 OTHER SPECIFIED DISEASES OF ANUS AND RECTUM: ICD-10-CM

## 2021-05-05 PROCEDURE — 72197 MRI PELVIS W/O & W/DYE: CPT | Mod: 26

## 2021-05-05 PROCEDURE — 71260 CT THORAX DX C+: CPT | Mod: 26

## 2021-05-05 PROCEDURE — 74177 CT ABD & PELVIS W/CONTRAST: CPT | Mod: 26

## 2021-05-05 PROCEDURE — 82565 ASSAY OF CREATININE: CPT

## 2021-05-05 PROCEDURE — 74177 CT ABD & PELVIS W/CONTRAST: CPT

## 2021-05-05 PROCEDURE — 71260 CT THORAX DX C+: CPT

## 2021-05-05 PROCEDURE — A9585: CPT

## 2021-05-05 PROCEDURE — 72197 MRI PELVIS W/O & W/DYE: CPT

## 2021-05-10 ENCOUNTER — APPOINTMENT (OUTPATIENT)
Dept: FAMILY MEDICINE | Facility: CLINIC | Age: 81
End: 2021-05-10
Payer: MEDICARE

## 2021-05-10 VITALS
TEMPERATURE: 97.2 F | OXYGEN SATURATION: 97 % | DIASTOLIC BLOOD PRESSURE: 72 MMHG | SYSTOLIC BLOOD PRESSURE: 130 MMHG | BODY MASS INDEX: 29.02 KG/M2 | WEIGHT: 170 LBS | HEART RATE: 80 BPM | HEIGHT: 64 IN

## 2021-05-10 PROCEDURE — 99214 OFFICE O/P EST MOD 30 MIN: CPT

## 2021-05-10 PROCEDURE — 99072 ADDL SUPL MATRL&STAF TM PHE: CPT

## 2021-05-11 ENCOUNTER — RESULT REVIEW (OUTPATIENT)
Age: 81
End: 2021-05-11

## 2021-05-11 ENCOUNTER — APPOINTMENT (OUTPATIENT)
Dept: CARDIOLOGY | Facility: CLINIC | Age: 81
End: 2021-05-11
Payer: MEDICARE

## 2021-05-11 ENCOUNTER — OUTPATIENT (OUTPATIENT)
Dept: OUTPATIENT SERVICES | Facility: HOSPITAL | Age: 81
LOS: 1 days | End: 2021-05-11
Payer: MEDICARE

## 2021-05-11 ENCOUNTER — NON-APPOINTMENT (OUTPATIENT)
Age: 81
End: 2021-05-11

## 2021-05-11 VITALS
SYSTOLIC BLOOD PRESSURE: 130 MMHG | DIASTOLIC BLOOD PRESSURE: 82 MMHG | WEIGHT: 170 LBS | OXYGEN SATURATION: 95 % | BODY MASS INDEX: 29.02 KG/M2 | HEART RATE: 82 BPM | HEIGHT: 64 IN

## 2021-05-11 VITALS
DIASTOLIC BLOOD PRESSURE: 69 MMHG | SYSTOLIC BLOOD PRESSURE: 136 MMHG | RESPIRATION RATE: 20 BRPM | HEART RATE: 68 BPM | TEMPERATURE: 99 F | OXYGEN SATURATION: 98 % | WEIGHT: 175.27 LBS | HEIGHT: 64 IN

## 2021-05-11 DIAGNOSIS — Z98.49 CATARACT EXTRACTION STATUS, UNSPECIFIED EYE: Chronic | ICD-10-CM

## 2021-05-11 DIAGNOSIS — K62.89 OTHER SPECIFIED DISEASES OF ANUS AND RECTUM: ICD-10-CM

## 2021-05-11 DIAGNOSIS — Z01.818 ENCOUNTER FOR OTHER PREPROCEDURAL EXAMINATION: ICD-10-CM

## 2021-05-11 DIAGNOSIS — Z95.1 PRESENCE OF AORTOCORONARY BYPASS GRAFT: Chronic | ICD-10-CM

## 2021-05-11 LAB
ANION GAP SERPL CALC-SCNC: 6 MMOL/L — SIGNIFICANT CHANGE UP (ref 5–17)
APTT BLD: 32.3 SEC — SIGNIFICANT CHANGE UP (ref 27.5–35.5)
BASOPHILS # BLD AUTO: 0.06 K/UL — SIGNIFICANT CHANGE UP (ref 0–0.2)
BASOPHILS NFR BLD AUTO: 0.8 % — SIGNIFICANT CHANGE UP (ref 0–2)
BUN SERPL-MCNC: 21 MG/DL — SIGNIFICANT CHANGE UP (ref 7–23)
CALCIUM SERPL-MCNC: 9.6 MG/DL — SIGNIFICANT CHANGE UP (ref 8.5–10.1)
CHLORIDE SERPL-SCNC: 107 MMOL/L — SIGNIFICANT CHANGE UP (ref 96–108)
CO2 SERPL-SCNC: 26 MMOL/L — SIGNIFICANT CHANGE UP (ref 22–31)
CREAT SERPL-MCNC: 0.87 MG/DL — SIGNIFICANT CHANGE UP (ref 0.5–1.3)
EOSINOPHIL # BLD AUTO: 0.29 K/UL — SIGNIFICANT CHANGE UP (ref 0–0.5)
EOSINOPHIL NFR BLD AUTO: 3.7 % — SIGNIFICANT CHANGE UP (ref 0–6)
GLUCOSE SERPL-MCNC: 107 MG/DL — HIGH (ref 70–99)
HCT VFR BLD CALC: 43.9 % — SIGNIFICANT CHANGE UP (ref 34.5–45)
HGB BLD-MCNC: 13.7 G/DL — SIGNIFICANT CHANGE UP (ref 11.5–15.5)
IMM GRANULOCYTES NFR BLD AUTO: 0.4 % — SIGNIFICANT CHANGE UP (ref 0–1.5)
INR BLD: 1.03 RATIO — SIGNIFICANT CHANGE UP (ref 0.88–1.16)
LYMPHOCYTES # BLD AUTO: 2.51 K/UL — SIGNIFICANT CHANGE UP (ref 1–3.3)
LYMPHOCYTES # BLD AUTO: 32 % — SIGNIFICANT CHANGE UP (ref 13–44)
MCHC RBC-ENTMCNC: 26.9 PG — LOW (ref 27–34)
MCHC RBC-ENTMCNC: 31.2 GM/DL — LOW (ref 32–36)
MCV RBC AUTO: 86.1 FL — SIGNIFICANT CHANGE UP (ref 80–100)
MONOCYTES # BLD AUTO: 0.55 K/UL — SIGNIFICANT CHANGE UP (ref 0–0.9)
MONOCYTES NFR BLD AUTO: 7 % — SIGNIFICANT CHANGE UP (ref 2–14)
NEUTROPHILS # BLD AUTO: 4.4 K/UL — SIGNIFICANT CHANGE UP (ref 1.8–7.4)
NEUTROPHILS NFR BLD AUTO: 56.1 % — SIGNIFICANT CHANGE UP (ref 43–77)
PLATELET # BLD AUTO: 243 K/UL — SIGNIFICANT CHANGE UP (ref 150–400)
POTASSIUM SERPL-MCNC: 4 MMOL/L — SIGNIFICANT CHANGE UP (ref 3.5–5.3)
POTASSIUM SERPL-SCNC: 4 MMOL/L — SIGNIFICANT CHANGE UP (ref 3.5–5.3)
PROTHROM AB SERPL-ACNC: 12 SEC — SIGNIFICANT CHANGE UP (ref 10.6–13.6)
RBC # BLD: 5.1 M/UL — SIGNIFICANT CHANGE UP (ref 3.8–5.2)
RBC # FLD: 14.8 % — HIGH (ref 10.3–14.5)
SODIUM SERPL-SCNC: 139 MMOL/L — SIGNIFICANT CHANGE UP (ref 135–145)
WBC # BLD: 7.84 K/UL — SIGNIFICANT CHANGE UP (ref 3.8–10.5)
WBC # FLD AUTO: 7.84 K/UL — SIGNIFICANT CHANGE UP (ref 3.8–10.5)

## 2021-05-11 PROCEDURE — 86900 BLOOD TYPING SEROLOGIC ABO: CPT

## 2021-05-11 PROCEDURE — 71046 X-RAY EXAM CHEST 2 VIEWS: CPT | Mod: 26

## 2021-05-11 PROCEDURE — 86901 BLOOD TYPING SEROLOGIC RH(D): CPT

## 2021-05-11 PROCEDURE — 86850 RBC ANTIBODY SCREEN: CPT

## 2021-05-11 PROCEDURE — 71046 X-RAY EXAM CHEST 2 VIEWS: CPT

## 2021-05-11 PROCEDURE — 85025 COMPLETE CBC W/AUTO DIFF WBC: CPT

## 2021-05-11 PROCEDURE — 85610 PROTHROMBIN TIME: CPT

## 2021-05-11 PROCEDURE — 36415 COLL VENOUS BLD VENIPUNCTURE: CPT

## 2021-05-11 PROCEDURE — 80048 BASIC METABOLIC PNL TOTAL CA: CPT

## 2021-05-11 PROCEDURE — 85730 THROMBOPLASTIN TIME PARTIAL: CPT

## 2021-05-11 PROCEDURE — G0463: CPT | Mod: 25

## 2021-05-11 PROCEDURE — 99072 ADDL SUPL MATRL&STAF TM PHE: CPT

## 2021-05-11 PROCEDURE — 99214 OFFICE O/P EST MOD 30 MIN: CPT

## 2021-05-11 PROCEDURE — 93010 ELECTROCARDIOGRAM REPORT: CPT

## 2021-05-11 PROCEDURE — 93005 ELECTROCARDIOGRAM TRACING: CPT

## 2021-05-11 NOTE — H&P PST ADULT - NSICDXFAMILYHX_GEN_ALL_CORE_FT
FAMILY HISTORY:  Father  Still living? No  Family history of lung cancer, Age at diagnosis: Age Unknown    Mother  Still living? No  Family history of CHF (congestive heart failure), Age at diagnosis: Age Unknown

## 2021-05-11 NOTE — DISCUSSION/SUMMARY
[FreeTextEntry1] : Pt is a 80 y/o F with PMH CAD s/p 3V CABG 12/2016, HTN, HLD who presents today for evaluation prior to rectal mass resection\par Nuclear stress test 12/2018 fair exercise tolerance, normal myocardial perfusion\par TTE 11/2018 EF 50-55% with inf wall hypokinesis, mild DD, mild TR\par c/w metoprolol, lisinopril\par c/w ASA and statin for CAD history\par Can hold ASA prior to procedure if needed\par At this time the pt has no signs or symptoms of active ischemia, heart failure, life-threatening arrhythmias, severe valvular pathology.\par VSS\par There are no cardiac contraindications for planned procedure. \par The described plan was discussed with the pt.  All questions and concerns were addressed to the best of my knowledge.\par

## 2021-05-11 NOTE — H&P PST ADULT - HEALTH CARE MAINTENANCE
covid- pfizer completed 2 doses April 20    Denies travel outside of state or country in the last 14 days.   Denies contact with known Coronavirus positive person.  Denies fever, chills, cough, congestion, runny nose, SOB or difficulty breathing, fatigue, muscle or body ache, headache. loss of taste or smell, N/V/D.

## 2021-05-11 NOTE — HISTORY OF PRESENT ILLNESS
[FreeTextEntry1] : 80 year old female with a history of 3 vessel CABG including LIMA to LAD for triple vessel CAD with systolic function 45-50% seen on TTE 01/2017.  Repeat TTE 2018 shows low-normal LV function with mild valvular regurgitation.  She has done well post-operatively and is compliant with meds. \par COVID vaccine 05/2021 Pfizer\par \par She was recently found to have rectal mass (path tubulovilous adenoma) with possible lung mets and is scheduled to have resection of rectal mass 05/17/2021 at [facility].  She is feeling well from a cardiac standpoint - denies CP, SOB, diaphoresis, palpitations, dizziness, syncope, LE edema, PND, orthopnea. \par She is not very active right now due to back pain.  She climbs 1-2 flights of stairs without cardiac symptoms\par \par Nuclear stress test 12/2018 fair exercise tolerance, normal myocardial perfusion\par TTE 11/2018 EF 50-55% with inf wall hypokinesis, mild DD, mild TR\par \par \par

## 2021-05-11 NOTE — H&P PST ADULT - ASSESSMENT
80 year old woman presents to PST for preprocedure exam. Patient is for planned transanal excision of rectal mass polyp, partial protectomy , proctoplasty, colonoscopies with Dr Jensen on 5/17      Plan:  - PST instructions given ; NPO status instructions to be given by ASU .  - Pt instructed to take following meds with sip of water : amlodopine, lisinopril  - Pt instructed to take routine evening medications unless indicated .  -  Stop NSAIDS ( Aspirin Alev Motrin Mobic Diclofenac), herbal supplements , MVI , Vitamin fish oil 7 days prior to surgery  unless   directed by surgeon or cardiologist;   - Medical Optimization  with Dr Reyes and Dr Salgado  - EZ wash instructions given   - Labs EKG CXR as per surgeon request.   -  Pt instructed to self quarantine .  - Covid Testing scheduled

## 2021-05-11 NOTE — H&P PST ADULT - NSICDXPASTMEDICALHX_GEN_ALL_CORE_FT
PAST MEDICAL HISTORY:  Angina pectoris     Anxiety     Chronic back pain     History of COPD     Hyperlipidemia     Hypertension     Rectal cancer     Rectal mass

## 2021-05-11 NOTE — H&P PST ADULT - HISTORY OF PRESENT ILLNESS
80 year old woman presents to [facility] for preprocedure exam. Patient is for planned transanal excision of rectal mass polyp, partial protectomy , proctoplasty, colonoscopies with Dr Jensen on 5/17- as per patient she was diagnosed with rectal mass recently. patient endorses to rectal bleeding intermittent. No change on bowel pattern.

## 2021-05-11 NOTE — H&P PST ADULT - NSANTHOSAYNRD_GEN_A_CORE
No. STU screening performed.  STOP BANG Legend: 0-2 = LOW Risk; 3-4 = INTERMEDIATE Risk; 5-8 = HIGH Risk

## 2021-05-12 DIAGNOSIS — Z01.818 ENCOUNTER FOR OTHER PREPROCEDURAL EXAMINATION: ICD-10-CM

## 2021-05-12 DIAGNOSIS — K62.89 OTHER SPECIFIED DISEASES OF ANUS AND RECTUM: ICD-10-CM

## 2021-05-12 PROBLEM — Z87.09 PERSONAL HISTORY OF OTHER DISEASES OF THE RESPIRATORY SYSTEM: Chronic | Status: ACTIVE | Noted: 2021-05-11

## 2021-05-12 PROBLEM — M54.9 DORSALGIA, UNSPECIFIED: Chronic | Status: ACTIVE | Noted: 2021-05-11

## 2021-05-12 RX ORDER — SODIUM CHLORIDE 9 MG/ML
3 INJECTION INTRAMUSCULAR; INTRAVENOUS; SUBCUTANEOUS EVERY 8 HOURS
Refills: 0 | Status: DISCONTINUED | OUTPATIENT
Start: 2021-05-17 | End: 2021-05-17

## 2021-05-13 ENCOUNTER — APPOINTMENT (OUTPATIENT)
Dept: NUCLEAR MEDICINE | Facility: CLINIC | Age: 81
End: 2021-05-13

## 2021-05-13 NOTE — ASSESSMENT
[Patient Optimized for Surgery] : Patient optimized for surgery [FreeTextEntry4] : Pt is medically optimized for procedure at this time. Pt has hx of cardiomyopathy and CAD s/p CABG x 3, pt will obtain cardiac clearance from cardio Dr. Salgado. Pt has COPD, monitor for bronchospasm perioperatively.

## 2021-05-14 ENCOUNTER — APPOINTMENT (OUTPATIENT)
Dept: DISASTER EMERGENCY | Facility: CLINIC | Age: 81
End: 2021-05-14

## 2021-05-15 LAB — SARS-COV-2 N GENE NPH QL NAA+PROBE: NOT DETECTED

## 2021-05-17 ENCOUNTER — OUTPATIENT (OUTPATIENT)
Dept: INPATIENT UNIT | Facility: HOSPITAL | Age: 81
LOS: 1 days | Discharge: HOME | End: 2021-05-17
Payer: MEDICARE

## 2021-05-17 ENCOUNTER — RESULT REVIEW (OUTPATIENT)
Age: 81
End: 2021-05-17

## 2021-05-17 ENCOUNTER — APPOINTMENT (OUTPATIENT)
Dept: COLORECTAL SURGERY | Facility: HOSPITAL | Age: 81
End: 2021-05-17
Payer: MEDICARE

## 2021-05-17 VITALS
WEIGHT: 169.98 LBS | HEART RATE: 86 BPM | OXYGEN SATURATION: 96 % | HEIGHT: 64 IN | RESPIRATION RATE: 17 BRPM | TEMPERATURE: 97 F | SYSTOLIC BLOOD PRESSURE: 139 MMHG | DIASTOLIC BLOOD PRESSURE: 79 MMHG

## 2021-05-17 VITALS
HEART RATE: 76 BPM | TEMPERATURE: 97 F | OXYGEN SATURATION: 97 % | SYSTOLIC BLOOD PRESSURE: 143 MMHG | DIASTOLIC BLOOD PRESSURE: 71 MMHG | RESPIRATION RATE: 16 BRPM

## 2021-05-17 DIAGNOSIS — D49.0 NEOPLASM OF UNSPECIFIED BEHAVIOR OF DIGESTIVE SYSTEM: ICD-10-CM

## 2021-05-17 DIAGNOSIS — Z95.1 PRESENCE OF AORTOCORONARY BYPASS GRAFT: Chronic | ICD-10-CM

## 2021-05-17 DIAGNOSIS — Z98.49 CATARACT EXTRACTION STATUS, UNSPECIFIED EYE: Chronic | ICD-10-CM

## 2021-05-17 DIAGNOSIS — K62.89 OTHER SPECIFIED DISEASES OF ANUS AND RECTUM: ICD-10-CM

## 2021-05-17 PROCEDURE — 45172 EXC RECT TUM TRANSANAL FULL: CPT | Mod: AS

## 2021-05-17 PROCEDURE — C1889: CPT

## 2021-05-17 PROCEDURE — 88307 TISSUE EXAM BY PATHOLOGIST: CPT

## 2021-05-17 PROCEDURE — 45172 EXC RECT TUM TRANSANAL FULL: CPT

## 2021-05-17 PROCEDURE — 88307 TISSUE EXAM BY PATHOLOGIST: CPT | Mod: 26

## 2021-05-17 RX ORDER — HEPARIN SODIUM 5000 [USP'U]/ML
5000 INJECTION INTRAVENOUS; SUBCUTANEOUS ONCE
Refills: 0 | Status: COMPLETED | OUTPATIENT
Start: 2021-05-17 | End: 2021-05-17

## 2021-05-17 RX ORDER — ONDANSETRON 8 MG/1
4 TABLET, FILM COATED ORAL EVERY 6 HOURS
Refills: 0 | Status: DISCONTINUED | OUTPATIENT
Start: 2021-05-17 | End: 2021-05-17

## 2021-05-17 RX ORDER — OXYCODONE AND ACETAMINOPHEN 5; 325 MG/1; MG/1
1 TABLET ORAL EVERY 4 HOURS
Refills: 0 | Status: DISCONTINUED | OUTPATIENT
Start: 2021-05-17 | End: 2021-05-17

## 2021-05-17 RX ADMIN — HEPARIN SODIUM 5000 UNIT(S): 5000 INJECTION INTRAVENOUS; SUBCUTANEOUS at 07:29

## 2021-05-17 NOTE — ASU DISCHARGE PLAN (ADULT/PEDIATRIC) - DATE OF LAST VACCINATION
17-Apr-2021 Admit to regional medicine  Continue IV abx  Obtain bcx and sputum culture  RVP presenting with symptoms of shortness of breath for the past week. Endorses difficulty breathing while ambulating and cough with yellow phlegm. Denies fevers and chills. Afebrile on admission with tachycardia to 110, hypotensive to 97/61. S/p 1.5L NS and ceftriaxone and azithromycin.  - CXR clear  - CT chest showing loculated mild to moderate left pleural effusion. Suspect left lower lobe consolidation superimposed on atelectasis. Focal stenosis of a left lower lobe segmental bronchus also seen. Differentials included atelectasis, pneumonia and neoplasm.  - low concern for PE given patient on Eliquis, not hypoxic, not tachycardic, and hypotension resolving with fluids.  - continue CAP treatment  - pulm consulted presenting with symptoms of shortness of breath for the past week. Endorses difficulty breathing while ambulating and cough with yellow phlegm. Denies fevers and chills. Afebrile on admission with tachycardia to 110, hypotensive to 97/61. S/p 1.5L NS and ceftriaxone and azithromycin.  - CXR with cardiomegaly, thoracic aortic calcification and bilateral opacities/pleural effusions  - CT chest showing loculated mild to moderate left pleural effusion. Suspect left lower lobe consolidation superimposed on atelectasis. Focal stenosis of a left lower lobe segmental bronchus also seen. Differentials included atelectasis, pneumonia and neoplasm.  - low concern for PE given patient on Eliquis, not hypoxic, not tachycardic, and hypotension resolving with fluids.  - continue CAP treatment  - pulm consulted

## 2021-05-17 NOTE — ASU DISCHARGE PLAN (ADULT/PEDIATRIC) - CARE PROVIDER_API CALL
Kem Jensen)  ColonRectal Surgery; [place]  [address] [address]  Phone: [phone]  Fax: [phone]  Follow Up Time: 1 month

## 2021-05-17 NOTE — ASU PATIENT PROFILE, ADULT - PMH
Angina pectoris    Anxiety    Chronic back pain    History of COPD    Hyperlipidemia    Hypertension    Rectal cancer    Rectal mass

## 2021-05-24 DIAGNOSIS — Z87.891 PERSONAL HISTORY OF NICOTINE DEPENDENCE: ICD-10-CM

## 2021-05-24 DIAGNOSIS — I42.9 CARDIOMYOPATHY, UNSPECIFIED: ICD-10-CM

## 2021-05-24 DIAGNOSIS — Z95.1 PRESENCE OF AORTOCORONARY BYPASS GRAFT: ICD-10-CM

## 2021-05-24 DIAGNOSIS — I10 ESSENTIAL (PRIMARY) HYPERTENSION: ICD-10-CM

## 2021-05-24 DIAGNOSIS — J43.9 EMPHYSEMA, UNSPECIFIED: ICD-10-CM

## 2021-05-24 DIAGNOSIS — I25.10 ATHEROSCLEROTIC HEART DISEASE OF NATIVE CORONARY ARTERY WITHOUT ANGINA PECTORIS: ICD-10-CM

## 2021-05-24 DIAGNOSIS — D37.5 NEOPLASM OF UNCERTAIN BEHAVIOR OF RECTUM: ICD-10-CM

## 2021-05-24 DIAGNOSIS — D12.8 BENIGN NEOPLASM OF RECTUM: ICD-10-CM

## 2021-05-24 DIAGNOSIS — E66.9 OBESITY, UNSPECIFIED: ICD-10-CM

## 2021-05-24 DIAGNOSIS — Z79.899 OTHER LONG TERM (CURRENT) DRUG THERAPY: ICD-10-CM

## 2021-05-24 DIAGNOSIS — Z79.82 LONG TERM (CURRENT) USE OF ASPIRIN: ICD-10-CM

## 2021-06-18 ENCOUNTER — RX RENEWAL (OUTPATIENT)
Age: 81
End: 2021-06-18

## 2021-06-22 ENCOUNTER — APPOINTMENT (OUTPATIENT)
Dept: COLORECTAL SURGERY | Facility: CLINIC | Age: 81
End: 2021-06-22
Payer: MEDICARE

## 2021-06-22 VITALS
HEART RATE: 87 BPM | TEMPERATURE: 97.6 F | HEIGHT: 64 IN | BODY MASS INDEX: 29.02 KG/M2 | DIASTOLIC BLOOD PRESSURE: 85 MMHG | RESPIRATION RATE: 14 BRPM | WEIGHT: 170 LBS | SYSTOLIC BLOOD PRESSURE: 175 MMHG

## 2021-06-22 PROCEDURE — 99024 POSTOP FOLLOW-UP VISIT: CPT

## 2021-07-16 ENCOUNTER — RX RENEWAL (OUTPATIENT)
Age: 81
End: 2021-07-16

## 2021-07-27 RX ORDER — NEOMYCIN SULFATE 500 MG/1
500 TABLET ORAL
Qty: 6 | Refills: 0 | Status: DISCONTINUED | COMMUNITY
Start: 2021-04-27 | End: 2021-07-27

## 2021-07-27 RX ORDER — METRONIDAZOLE 500 MG/1
500 TABLET ORAL
Qty: 3 | Refills: 0 | Status: DISCONTINUED | COMMUNITY
Start: 2021-04-27 | End: 2021-07-27

## 2021-07-27 NOTE — DATA REVIEWED
[FreeTextEntry1] : surgical pathology reviewed \par \par  Pathology             Final\par \par No Documents Attached\par \par \par \par \par   Stacy Accession Number : [id number]\par \par SIMEON CABRAL                        1\par \par \par \par Surgical Final Report\par \par \par \par \par Final Diagnosis\par Large bowel, rectum (excision):\par - Villoglandular adenoma.\par \par Verified by: SUN GUY M.D.\par (Electronic Signature)\par Reported on: 05/19/21 13:03 EDT, [facility], [address] [place] [address]\par Phone: [phone]   Fax: [phone]\par _________________________________________________________________\par \par Comment\par All or portions of this case have undergone intradepartmental\par review. 3, 4\par \par Clinical History\par Rectal mass, rectal polyp, rectal cancer\par \par Specimen(s) Submitted\par 1     Rectal mass rectal polyp\par \par Gross Description\par Received: In formalin labeled "rectal mass rectal polyp"\par Size: 3.8 x 3 x 1.2 cm aggregate\par Description: Aggregate of disrupted soft and friable tan-brown\par tissue\par Submitted: Representative sections in five cassettes: 1A-1E\par \par Blake Vasqueznaresh 05/18/2021 09:01 AM\par \par Disclaimer\par In addition to other data that may appear on the specimen\par containers, all labels have been inspected to confirm the\par presence of the patient's name and date of birth.\par \par \par Surgical Consult Report\par \par \par \par \par Disclaimer\par In addition to other data that may appear on the specimen\par containers, all labels have been inspected to confirm the\par presence of the patient's name and date of birth.\par \par  \par \par  Ordered by: CONI LAWRENCE       Collected/Examined: 17May2021 08:19AM       \par Verified by: AMERICO QUEVEDO 20May2021 02:17PM       \par  Result Communication: No patient communication needed at this time;\par Stage: Final       \par  Performed at: [facility]       Resulted: 19May2021 01:03PM       Last Updated: 20May2021 02:17PM       Accession: [id number]

## 2021-07-27 NOTE — HISTORY OF PRESENT ILLNESS
[FreeTextEntry1] : 80 year old female who presents to the office s/p transanal excision for rectal polyp. Doing well. Surgical pathology showed villoglandular adenoma (no dysplasia).  She reports doing well. tolerating PO, ambulating, soft BMs. Denies fever/chills.

## 2021-07-27 NOTE — ASSESSMENT
[FreeTextEntry1] : 80 year old female with rectal polyp who is s/p transanal excision of rectal polyp. Doing well. Surgical site was w/o s/s of infection. Surgical pathology was reviewed with patient. All questions/concerns addressed with the patient to her satisfaction. \par \par s/p transanal excision\par - regular diet\par - fiber, stool softeners as needed\par - sitz baths as needed\par \par RTO 2-3 months for f/u with Dr. Jensen

## 2021-07-27 NOTE — PHYSICAL EXAM
[Respiratory Effort] : normal respiratory effort [No Rash or Lesion] : No rash or lesion [Alert] : alert [Oriented to Person] : oriented to person [Oriented to Place] : oriented to place [Oriented to Time] : oriented to time [Calm] : calm [de-identified] : surgical site CDI, no erythema, drainage or odor noted.  [de-identified] : well appearing, no acute distress  [de-identified] : ALISHA x4 [de-identified] : warm and dry

## 2021-08-03 ENCOUNTER — APPOINTMENT (OUTPATIENT)
Dept: COLORECTAL SURGERY | Facility: CLINIC | Age: 81
End: 2021-08-03
Payer: MEDICARE

## 2021-08-03 DIAGNOSIS — C20 MALIGNANT NEOPLASM OF RECTUM: ICD-10-CM

## 2021-08-03 PROCEDURE — 99024 POSTOP FOLLOW-UP VISIT: CPT

## 2021-08-04 ENCOUNTER — APPOINTMENT (OUTPATIENT)
Dept: NEUROSURGERY | Facility: CLINIC | Age: 81
End: 2021-08-04
Payer: MEDICARE

## 2021-08-04 VITALS
HEART RATE: 78 BPM | TEMPERATURE: 97.6 F | OXYGEN SATURATION: 97 % | DIASTOLIC BLOOD PRESSURE: 92 MMHG | BODY MASS INDEX: 29.02 KG/M2 | SYSTOLIC BLOOD PRESSURE: 158 MMHG | WEIGHT: 170 LBS | HEIGHT: 64 IN

## 2021-08-04 VITALS
DIASTOLIC BLOOD PRESSURE: 96 MMHG | RESPIRATION RATE: 14 BRPM | WEIGHT: 170 LBS | HEIGHT: 64 IN | HEART RATE: 88 BPM | TEMPERATURE: 97.5 F | BODY MASS INDEX: 29.02 KG/M2 | SYSTOLIC BLOOD PRESSURE: 181 MMHG

## 2021-08-04 PROBLEM — C20 RECTAL CANCER: Status: ACTIVE | Noted: 2021-05-03

## 2021-08-04 PROCEDURE — 99203 OFFICE O/P NEW LOW 30 MIN: CPT

## 2021-08-04 NOTE — PHYSICAL EXAM
[Excoriation] : no perianal excoriation [Tender, Swollen] : nontender, non-swollen [Normal] : was normal [None] : there was no rectal mass  [de-identified] : Looks well in no distress, of stated age.

## 2021-08-04 NOTE — HISTORY OF PRESENT ILLNESS
[FreeTextEntry1] : 80 year old female postop transanal excision of rectal polyp pathology demonstrates tubulovillous adenoma. Patient is doing well overall

## 2021-08-06 ENCOUNTER — APPOINTMENT (OUTPATIENT)
Dept: FAMILY MEDICINE | Facility: CLINIC | Age: 81
End: 2021-08-06
Payer: MEDICARE

## 2021-08-06 VITALS
SYSTOLIC BLOOD PRESSURE: 140 MMHG | TEMPERATURE: 97.6 F | HEIGHT: 64 IN | BODY MASS INDEX: 29.02 KG/M2 | OXYGEN SATURATION: 93 % | HEART RATE: 86 BPM | RESPIRATION RATE: 16 BRPM | WEIGHT: 170 LBS | DIASTOLIC BLOOD PRESSURE: 80 MMHG

## 2021-08-06 PROCEDURE — 99213 OFFICE O/P EST LOW 20 MIN: CPT | Mod: 25

## 2021-08-06 PROCEDURE — 20610 DRAIN/INJ JOINT/BURSA W/O US: CPT

## 2021-08-06 RX ORDER — CIPROFLOXACIN HYDROCHLORIDE 500 MG/1
500 TABLET, FILM COATED ORAL TWICE DAILY
Qty: 20 | Refills: 0 | Status: DISCONTINUED | COMMUNITY
Start: 2021-07-02 | End: 2021-08-06

## 2021-08-06 RX ORDER — ONDANSETRON 4 MG/1
4 TABLET, ORALLY DISINTEGRATING ORAL
Qty: 30 | Refills: 3 | Status: ACTIVE | COMMUNITY
Start: 2021-05-18 | End: 1900-01-01

## 2021-08-06 RX ORDER — AZITHROMYCIN 250 MG/1
250 TABLET, FILM COATED ORAL
Qty: 1 | Refills: 0 | Status: DISCONTINUED | COMMUNITY
Start: 2021-04-02 | End: 2021-08-06

## 2021-08-06 RX ORDER — METHYLPRED ACET/NACL,ISO-OS/PF 40 MG/ML
40 VIAL (ML) INJECTION
Qty: 1 | Refills: 0 | Status: COMPLETED | OUTPATIENT
Start: 2021-08-06

## 2021-08-06 RX ORDER — OFLOXACIN 3 MG/ML
0.3 SOLUTION/ DROPS OPHTHALMIC
Qty: 1 | Refills: 2 | Status: DISCONTINUED | COMMUNITY
Start: 2020-11-03 | End: 2021-08-06

## 2021-08-06 RX ADMIN — METHYLPREDNISOLONE ACETATE 0 MG/ML: 40 INJECTION, SUSPENSION INTRA-ARTICULAR; INTRALESIONAL; INTRAMUSCULAR; SOFT TISSUE at 00:00

## 2021-08-06 NOTE — HISTORY OF PRESENT ILLNESS
[FreeTextEntry8] : Painful right knee effusion for several days history of osteoarthritis had successful intra-articular steroid injection left knee about 1 year ago.  Patient requests another injection\par \par 10:00 approach 20 cc of serous fluid removed after Betadine x3 40 mg Medrol 2 cc Marcaine injected with partial relief\par \par Celecoxib 200 mg 1 daily and Tylenol as needed ice rest follow-up as needed

## 2021-08-20 ENCOUNTER — APPOINTMENT (OUTPATIENT)
Dept: CARDIOLOGY | Facility: CLINIC | Age: 81
End: 2021-08-20
Payer: MEDICARE

## 2021-08-20 ENCOUNTER — NON-APPOINTMENT (OUTPATIENT)
Age: 81
End: 2021-08-20

## 2021-08-20 VITALS
WEIGHT: 170 LBS | HEART RATE: 71 BPM | DIASTOLIC BLOOD PRESSURE: 70 MMHG | SYSTOLIC BLOOD PRESSURE: 140 MMHG | BODY MASS INDEX: 29.02 KG/M2 | OXYGEN SATURATION: 94 % | HEIGHT: 64 IN

## 2021-08-20 PROCEDURE — 99214 OFFICE O/P EST MOD 30 MIN: CPT | Mod: 25

## 2021-08-20 PROCEDURE — 93000 ELECTROCARDIOGRAM COMPLETE: CPT | Mod: NC

## 2021-08-20 NOTE — HISTORY OF PRESENT ILLNESS
[FreeTextEntry1] : 80 year old female with a history of 3 vessel CABG including LIMA to LAD for triple vessel CAD with systolic function 45-50% seen on TTE 01/2017.  Repeat TTE 2018 shows low-normal LV function with mild valvular regurgitation.  She has done well post-operatively and is compliant with meds. \par COVID vaccine 05/2021 Pfizer\par \par She is scheduled for a back procedure 08/31/2021 without conscious sedation.  She is feeling well from a cardiac standpoint - denies CP, SOB, diaphoresis, palpitations, dizziness, syncope, LE edema, PND, orthopnea. \par She is not very active right now due to back pain.  She climbs 1-2 flights of stairs without cardiac symptoms\par \par Nuclear stress test 12/2018 fair exercise tolerance, normal myocardial perfusion\par TTE 11/2018 EF 50-55% with inf wall hypokinesis, mild DD, mild TR\par \par \par

## 2021-08-20 NOTE — DISCUSSION/SUMMARY
[FreeTextEntry1] : Pt is a 78 y/o F with PMH CAD s/p 3V CABG 12/2016, HTN, HLD who presents today for evaluation prior to back procedure 08/31/2021 without conscious sedation.\par Nuclear stress test 12/2018 fair exercise tolerance, normal myocardial perfusion\par TTE 11/2018 EF 50-55% with inf wall hypokinesis, mild DD, mild TR\par c/w metoprolol, lisinopril\par c/w ASA and statin for CAD history\par Can hold ASA prior to procedure if needed\par At this time the pt has no signs or symptoms of active ischemia, heart failure, life-threatening arrhythmias, severe valvular pathology.\par VSS\par There are no cardiac contraindications for planned procedure. \par The described plan was discussed with the pt.  All questions and concerns were addressed to the best of my knowledge.\par

## 2021-08-25 ENCOUNTER — RX RENEWAL (OUTPATIENT)
Age: 81
End: 2021-08-25

## 2021-09-02 ENCOUNTER — APPOINTMENT (OUTPATIENT)
Dept: FAMILY MEDICINE | Facility: CLINIC | Age: 81
End: 2021-09-02
Payer: MEDICARE

## 2021-09-02 VITALS
HEART RATE: 87 BPM | OXYGEN SATURATION: 95 % | SYSTOLIC BLOOD PRESSURE: 144 MMHG | DIASTOLIC BLOOD PRESSURE: 82 MMHG | HEIGHT: 64 IN | TEMPERATURE: 97.6 F | BODY MASS INDEX: 29.02 KG/M2 | WEIGHT: 170 LBS

## 2021-09-02 DIAGNOSIS — K62.89 OTHER SPECIFIED DISEASES OF ANUS AND RECTUM: ICD-10-CM

## 2021-09-02 DIAGNOSIS — R94.39 ABNORMAL RESULT OF OTHER CARDIOVASCULAR FUNCTION STUDY: ICD-10-CM

## 2021-09-02 DIAGNOSIS — Z92.29 PERSONAL HISTORY OF OTHER DRUG THERAPY: ICD-10-CM

## 2021-09-02 DIAGNOSIS — H60.8X1 OTHER OTITIS EXTERNA, RIGHT EAR: ICD-10-CM

## 2021-09-02 DIAGNOSIS — G89.29 MYALGIA, OTHER SITE: ICD-10-CM

## 2021-09-02 DIAGNOSIS — H26.9 UNSPECIFIED CATARACT: ICD-10-CM

## 2021-09-02 DIAGNOSIS — M94.0 CHONDROCOSTAL JUNCTION SYNDROME [TIETZE]: ICD-10-CM

## 2021-09-02 DIAGNOSIS — M43.10 SPONDYLOLISTHESIS, SITE UNSPECIFIED: ICD-10-CM

## 2021-09-02 DIAGNOSIS — M79.18 MYALGIA, OTHER SITE: ICD-10-CM

## 2021-09-02 PROCEDURE — 99213 OFFICE O/P EST LOW 20 MIN: CPT | Mod: 25

## 2021-09-02 PROCEDURE — 20610 DRAIN/INJ JOINT/BURSA W/O US: CPT

## 2021-09-02 NOTE — HISTORY OF PRESENT ILLNESS
[de-identified] : Right knee pain improved temporarily after last procedure now knee pain has recurred.  She would like another steroid shot.  If this fails to solve the problem she is referred to Dr. Card\par \par Pain seems to be below the kneecap no longer above the kneecap.  Full range of motion of knee very slight crepitus\par \par Betadine x3 10:00 approach 1/2 cc of 2% lidocaine and 40 mg of Medrol instilled easily

## 2021-09-10 ENCOUNTER — APPOINTMENT (OUTPATIENT)
Age: 81
End: 2021-09-10
Payer: MEDICARE

## 2021-09-10 PROCEDURE — 99214 OFFICE O/P EST MOD 30 MIN: CPT

## 2021-09-10 PROCEDURE — 73564 X-RAY EXAM KNEE 4 OR MORE: CPT | Mod: RT

## 2021-09-10 NOTE — DISCUSSION/SUMMARY
[de-identified] : Patient is a 80-year-old female with osteoarthritis of the right knee who is currently experiencing acute arthritis exacerbation.  We did discuss operative and nonoperative indications and I do think conservative treatment is warranted at this time.  Due to the fact that she had a steroid injection approxi-1 week ago we are going to defer another steroid injection at this time.  We did discuss gel injections and she would like to try those.  We can order them and will make an appointment to follow-up in approxi-1 week for the gel injections.  All questions were asked and answered.  She was advised that she may be a candidate for total knee in the future.  I will see her back after the gel injections for repeat examination.

## 2021-09-10 NOTE — HISTORY OF PRESENT ILLNESS
[de-identified] : Patient is a 72-year-old female presents with chief complaint of right knee pain has been going on for the past 5+ weeks.  Patient states that she is having increasing pain over the medial aspect of the knee and was previously seen her family medicine doctor is given her 2 steroid injections to her knee.  States that this is failed to provide relief.  Denies any mechanical symptoms such as locking or popping.  Denies any buckling.  She does ambulate with a cane.  Of note she does have intermittent pain in her left knee as well.

## 2021-09-10 NOTE — PHYSICAL EXAM
[de-identified] : GENERAL APPEARANCE: Well nourished and hydrated, pleasant, alert, and oriented x 3. Appears their stated age. \par HEENT: Normocephalic, extraocular eye motion intact. Nasal septum midline. Oral cavity clear. External auditory canal clear. \par RESPIRATORY: Breath sounds clear and audible in all lobes. No wheezing, No accessory muscle use.\par CARDIOVASCULAR: No apparent abnormalities. No lower leg edema. No varicosities. Pedal pulses are palpable.\par NEUROLOGIC: Sensation is normal, no muscle weakness in the upper or lower extremities.\par DERMATOLOGIC: No apparent skin lesions, moist, warm, no rash.\par SPINE: Cervical spine appears normal and moves freely; thoracic spine appears normal and moves freely; lumbosacral spine appears normal and moves freely, normal, nontender.\par MUSCULOSKELETAL: Hands, wrists, and elbows are normal and move freely, shoulders are normal and move freely. \par Psychiatric: Oriented to person, place, and time, insight and judgement were intact and the affect was normal. \par Musculoskeletal:. Right knee exam shows no effusion, ROM is 0-1 30 degrees, no instability, no pain with Gio, medial joint line tenderness. \par 5/5 motor strength in bilateral lower extremities. Sensory: Intact in bilateral lower extremities. DTRs: Biceps, brachioradialis, triceps, patellar, ankle and plantar 2+ and symmetric bilaterally. Pulses: dorsalis pedis, posterior tibial, femoral, popliteal, and radial 2+ and symmetric bilaterally. \par Constitutional: Alert and in no acute distress, but well-appearing. \par  [de-identified] : 4 views of the right knee obtained in the office today show no acute fracture or dislocation.  There is severe medial joint space narrowing consistent with Kellgren-Wilfrid grade 3 4 changes.

## 2021-09-16 ENCOUNTER — APPOINTMENT (OUTPATIENT)
Dept: ORTHOPEDIC SURGERY | Facility: CLINIC | Age: 81
End: 2021-09-16

## 2021-09-24 ENCOUNTER — APPOINTMENT (OUTPATIENT)
Dept: FAMILY MEDICINE | Facility: CLINIC | Age: 81
End: 2021-09-24
Payer: MEDICARE

## 2021-09-24 VITALS
DIASTOLIC BLOOD PRESSURE: 72 MMHG | HEART RATE: 81 BPM | HEIGHT: 64 IN | TEMPERATURE: 96.9 F | WEIGHT: 170 LBS | SYSTOLIC BLOOD PRESSURE: 126 MMHG | OXYGEN SATURATION: 92 % | BODY MASS INDEX: 29.02 KG/M2

## 2021-09-24 DIAGNOSIS — M25.561 PAIN IN RIGHT KNEE: ICD-10-CM

## 2021-09-24 PROCEDURE — G0008: CPT

## 2021-09-24 PROCEDURE — 99213 OFFICE O/P EST LOW 20 MIN: CPT | Mod: 25

## 2021-09-24 PROCEDURE — 90662 IIV NO PRSV INCREASED AG IM: CPT

## 2021-09-24 NOTE — HISTORY OF PRESENT ILLNESS
[de-identified] : Reaccumulation of right knee effusion.  Patient has seen orthopedist has appointment for Euflexxa in 1 week.  X-rays showed bone-on-bone medial aspect of knee.  Need for eventual knee replacement discussed\par \par We will give therapeutic trial of low-dose Celebrex as needed Tylenol patient knows to take aspirin before she takes Celebrex to avoid interference.  Follow-up with orthopedist\par \par Flu shot given

## 2021-10-01 ENCOUNTER — APPOINTMENT (OUTPATIENT)
Dept: ORTHOPEDIC SURGERY | Facility: CLINIC | Age: 81
End: 2021-10-01
Payer: MEDICARE

## 2021-10-01 VITALS
WEIGHT: 170 LBS | SYSTOLIC BLOOD PRESSURE: 176 MMHG | DIASTOLIC BLOOD PRESSURE: 107 MMHG | BODY MASS INDEX: 29.02 KG/M2 | HEART RATE: 93 BPM | HEIGHT: 64 IN

## 2021-10-01 PROCEDURE — 20610 DRAIN/INJ JOINT/BURSA W/O US: CPT | Mod: RT

## 2021-10-01 NOTE — DISCUSSION/SUMMARY
[Medication Risks Reviewed] : Medication risks reviewed [de-identified] : Status post Orthovisc injection #1 to the right knee.  Tolerated well.  Will follow up in 1 week for repeat injection.

## 2021-10-01 NOTE — REASON FOR VISIT
[Initial Visit] : an initial visit for [FreeTextEntry2] : right knee Orthovisc #[id number] 2023-30-06

## 2021-10-01 NOTE — PROCEDURE
[de-identified] : Orthovisc # 1 Right knee\par Discussed at length with the patient the planned Orthovisc injection. The risks, benefits, convalescence and alternatives were reviewed. The possible side effects discussed included but were not limited to: pain, swelling, heat and redness. There symptoms are generally mild but if they are extensive then contact the office. Giving pain relievers by mouth such as NSAID´s or Tylenol can generally treat the reactions to Orthovisc. Rare cases of infection have been noted. Rash, hives and itching may occur post injection. If you have muscle pain or cramps, flushing and or swelling of the face, rapid heart beat, nausea, dizziness, fever, chills, headache, difficulty breathing, swelling in the arms or legs, or have a prickly feeling of your skin, contact a health care provider immediately.\par \par Following this discussion, the knee was prepped with betadine and under sterile condition the Orthovisc injection was performed with a 22 gauge needle. The needle was introduced into the joint, aspiration was performed to ensure intra-articular placement and the medication was injected. Upon withdrawal of the needle the site was cleaned with alcohol and a bandaid applied. The patient tolerated the injection well and there were no adverse effects. Post injection instructions included no strenuous activity for 24 hours, cryotherapy and if there are any adverse effects to contact the office.

## 2021-10-08 ENCOUNTER — APPOINTMENT (OUTPATIENT)
Dept: ORTHOPEDIC SURGERY | Facility: CLINIC | Age: 81
End: 2021-10-08
Payer: MEDICARE

## 2021-10-08 PROCEDURE — 20610 DRAIN/INJ JOINT/BURSA W/O US: CPT | Mod: RT

## 2021-10-08 NOTE — PROCEDURE
[de-identified] : Orthovisc # 2 Right knee\par Discussed at length with the patient the planned Orthovisc injection. The risks, benefits, convalescence and alternatives were reviewed. The possible side effects discussed included but were not limited to: pain, swelling, heat and redness. There symptoms are generally mild but if they are extensive then contact the office. Giving pain relievers by mouth such as NSAID´s or Tylenol can generally treat the reactions to Orthovisc. Rare cases of infection have been noted. Rash, hives and itching may occur post injection. If you have muscle pain or cramps, flushing and or swelling of the face, rapid heart beat, nausea, dizziness, fever, chills, headache, difficulty breathing, swelling in the arms or legs, or have a prickly feeling of your skin, contact a health care provider immediately.\par \par Following this discussion, the knee was prepped with betadine and under sterile condition the Orthovisc injection was performed with a 22 gauge needle. The needle was introduced into the joint, aspiration was performed to ensure intra-articular placement and the medication was injected. Upon withdrawal of the needle the site was cleaned with alcohol and a bandaid applied. The patient tolerated the injection well and there were no adverse effects. Post injection instructions included no strenuous activity for 24 hours, cryotherapy and if there are any adverse effects to contact the office.

## 2021-10-08 NOTE — DISCUSSION/SUMMARY
[Medication Risks Reviewed] : Medication risks reviewed [de-identified] : Status post Orthovisc injection #2 to the right knee.  Tolerated well.  Will follow up in 1 week for repeat injection.

## 2021-10-11 ENCOUNTER — RX RENEWAL (OUTPATIENT)
Age: 81
End: 2021-10-11

## 2021-10-21 ENCOUNTER — APPOINTMENT (OUTPATIENT)
Dept: ORTHOPEDIC SURGERY | Facility: CLINIC | Age: 81
End: 2021-10-21
Payer: MEDICARE

## 2021-10-21 VITALS
DIASTOLIC BLOOD PRESSURE: 101 MMHG | SYSTOLIC BLOOD PRESSURE: 193 MMHG | HEART RATE: 87 BPM | HEIGHT: 64 IN | BODY MASS INDEX: 29.02 KG/M2 | WEIGHT: 170 LBS

## 2021-10-21 PROCEDURE — 20610 DRAIN/INJ JOINT/BURSA W/O US: CPT | Mod: RT

## 2021-10-21 NOTE — PROCEDURE
[de-identified] : Orthovisc # 3 Right knee\par Discussed at length with the patient the planned Orthovisc injection. The risks, benefits, convalescence and alternatives were reviewed. The possible side effects discussed included but were not limited to: pain, swelling, heat and redness. There symptoms are generally mild but if they are extensive then contact the office. Giving pain relievers by mouth such as NSAID´s or Tylenol can generally treat the reactions to Orthovisc. Rare cases of infection have been noted. Rash, hives and itching may occur post injection. If you have muscle pain or cramps, flushing and or swelling of the face, rapid heart beat, nausea, dizziness, fever, chills, headache, difficulty breathing, swelling in the arms or legs, or have a prickly feeling of your skin, contact a health care provider immediately.\par \par Following this discussion, the knee was prepped with betadine and under sterile condition the Orthovisc injection was performed with a 22 gauge needle. The needle was introduced into the joint, aspiration was performed to ensure intra-articular placement and the medication was injected. Upon withdrawal of the needle the site was cleaned with alcohol and a bandaid applied. The patient tolerated the injection well and there were no adverse effects. Post injection instructions included no strenuous activity for 24 hours, cryotherapy and if there are any adverse effects to contact the office.

## 2021-10-21 NOTE — DISCUSSION/SUMMARY
[Medication Risks Reviewed] : Medication risks reviewed [de-identified] : Status post Orthovisc injection #3 of 3 to the right knee.  Tolerated well.  Will follow up in 1 month for repeat examination.

## 2021-11-02 ENCOUNTER — RX RENEWAL (OUTPATIENT)
Age: 81
End: 2021-11-02

## 2021-11-02 ENCOUNTER — APPOINTMENT (OUTPATIENT)
Dept: COLORECTAL SURGERY | Facility: CLINIC | Age: 81
End: 2021-11-02
Payer: MEDICARE

## 2021-11-02 VITALS — WEIGHT: 170 LBS | RESPIRATION RATE: 14 BRPM | BODY MASS INDEX: 29.02 KG/M2 | HEIGHT: 64 IN | TEMPERATURE: 96.9 F

## 2021-11-02 PROCEDURE — 46600 DIAGNOSTIC ANOSCOPY SPX: CPT

## 2021-11-02 PROCEDURE — 99213 OFFICE O/P EST LOW 20 MIN: CPT | Mod: 25

## 2021-11-07 NOTE — CONSULT LETTER
[Dear  ___] : Dear  [unfilled], [Courtesy Letter:] : I had the pleasure of seeing your patient, [unfilled], in my office today. [Sincerely,] : Sincerely, [FreeTextEntry2] : John Reyes\par [address]\[place] [address] [FreeTextEntry1] : This very pleasant 80-year-old woman is seen today to discuss treatment options for her persistent and progressive lower back pain with bilateral leg symptoms.  The patient has important additional medical history including coronary artery disease, COPD with emphysema, osteoarthritis, history of a rectal mass surgically removed, and hypertension with obesity.  The patient indicates approximately 2-year history of constant lower back pain.  With prolonged standing or walking she develops a significant heaviness and weakness within both legs consistent with a neurogenic claudication pattern.  She indicates specifically no trips or falls.  She does find walking increases pain down both legs and she is found going up or down stairs to be painful.  She is followed by orthopedic surgery for her knees.  No knee replacements are currently anticipated.  The patient has had bilateral knee injections.  The patient has undergone extensive conservative treatment including epidural steroid injections and chiropractic care including lumbar traction without sustained benefit.  The patient is taking daily pain medications especially Tylenol.  The patient denies any specific bowel or bladder dysfunction.\par \par I have reviewed with the patient her x-rays of the lumbar spine as well as MRI images performed at [facility].  The x-rays identify very mild anterolisthesis at L3-4 and L4-5.  There is mild degenerative loss of disc height.  In addition there is a gentle left apex degenerative scoliosis.  There is no dynamic instability with flexion-extension views.  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  These findings are consistent with the patient's description of neurogenic claudication.\par \par On examination the patient is in no acute distress in the seated position.  The patient's height is 5 foot 4 inches and her current weight is 170 pounds putting her BMI at 29.  The patient does have tenderness to the paraspinal muscles and to the SI joint region bilaterally with movement.  Straight leg raise is negative bilaterally.  The patient has intact strength of hip flexion, knee extension, and plantar and dorsi flexion.  The patella and ankle reflexes are symmetric and normal.  There is no clonus.  At rest there is no objective finding of decreased sensation to pinprick, light touch, or cool temperature.  The patient indicates numbness increasing in the calf medial and lateral bilaterally with standing and walking.\par \par I have had an extended discussion with the patient regarding degenerative spondylosis and its natural history and treatment options.  The patient has a pattern consistent with neurogenic claudication as a result of stenosis at L3-4 and L4-5.  The patient has a complicated presentation because of her significant bilateral knee symptoms as a result of osteoarthritis.  With respect to her spine treatment options include a laminectomy procedure to decompress the central canal and consideration of laminectomy with fusion based on the patient's mechanical component of pain.  I have concerns regarding the patient's ability to undergo a prolonged surgical intervention because of her general medical history.  I have therefore recommended that the patient would consider undergoing a focal epidural steroid injection and facet blockade at L3-4 and L4-5 to see if this provides significant or sustained benefit to the patient.  I will see the patient again after this has been performed in 3 months to see if the patient has had improvement.  In the meantime the patient will continue to follow with orthopedic surgery regarding her knee pathology.\par \par Thank you for kindly including me in the evaluation and treatment of your patient.  Please do not hesitate to contact me should you have any concerns or questions regarding this evaluation or the patient's ongoing follow-up care plan. [FreeTextEntry3] : Tyron Varma MD, PhD, FRCPSC\par Attending Neurosurgeon \par  of Neurosurgery \par [facility]\par [address]nd floor \[place] [address]\par Office: [phone]\par Fax: [phone]\par \par

## 2021-11-07 NOTE — REASON FOR VISIT
[New Patient Visit] : a new patient visit [FreeTextEntry1] : to discuss treatment options for lower back pain with bilateral leg symptoms

## 2021-11-12 NOTE — PHYSICAL EXAM
[Excoriation] : no perianal excoriation [Tender, Swollen] : nontender, non-swollen [Normal] : was normal [None] : there was no rectal mass  [JVD] : no jugular venous distention  [Normal Breath Sounds] : Normal breath sounds [No Rash or Lesion] : No rash or lesion [Alert] : alert [Oriented to Person] : oriented to person [Oriented to Place] : oriented to place [Oriented to Time] : oriented to time [Calm] : calm [de-identified] : Looks well in no distress, of stated age. [de-identified] : pupils equal reactive to light normocephalic atraumatic. [de-identified] : moves all 4 extremities appropriately with 5 over 5 strength

## 2021-11-12 NOTE — HISTORY OF PRESENT ILLNESS
[FreeTextEntry1] : 81 year old female postop transanal excision of rectal polyp pathology demonstrates tubulovillous adenoma. Patient is doing well overall

## 2021-11-17 ENCOUNTER — APPOINTMENT (OUTPATIENT)
Dept: ORTHOPEDIC SURGERY | Facility: CLINIC | Age: 81
End: 2021-11-17
Payer: MEDICARE

## 2021-11-17 VITALS
DIASTOLIC BLOOD PRESSURE: 103 MMHG | SYSTOLIC BLOOD PRESSURE: 191 MMHG | HEART RATE: 88 BPM | HEIGHT: 64 IN | WEIGHT: 170 LBS | BODY MASS INDEX: 29.02 KG/M2

## 2021-11-17 PROCEDURE — 99213 OFFICE O/P EST LOW 20 MIN: CPT

## 2021-11-17 NOTE — PHYSICAL EXAM
[de-identified] : GENERAL APPEARANCE: Well nourished and hydrated, pleasant, alert, and oriented x 3. Appears their stated age. \par HEENT: Normocephalic, extraocular eye motion intact. Nasal septum midline. Oral cavity clear. External auditory canal clear. \par RESPIRATORY: Breath sounds clear and audible in all lobes. No wheezing, No accessory muscle use.\par CARDIOVASCULAR: No apparent abnormalities. No lower leg edema. No varicosities. Pedal pulses are palpable.\par NEUROLOGIC: Sensation is normal, no muscle weakness in the upper or lower extremities.\par DERMATOLOGIC: No apparent skin lesions, moist, warm, no rash.\par SPINE: Cervical spine appears normal and moves freely; thoracic spine appears normal and moves freely; lumbosacral spine appears normal and moves freely, normal, nontender.\par MUSCULOSKELETAL: Hands, wrists, and elbows are normal and move freely, shoulders are normal and move freely. \par Psychiatric: Oriented to person, place, and time, insight and judgement were intact and the affect was normal. \par Musculoskeletal:. Right knee exam shows no effusion, ROM is 5-1 30 degrees, no instability, no pain with Gio, medial joint line tenderness. \par 5/5 motor strength in bilateral lower extremities. Sensory: Intact in bilateral lower extremities. DTRs: Biceps, brachioradialis, triceps, patellar, ankle and plantar 2+ and symmetric bilaterally. Pulses: dorsalis pedis, posterior tibial, femoral, popliteal, and radial 2+ and symmetric bilaterally. \par Constitutional: Alert and in no acute distress, but well-appearing. \par

## 2021-11-17 NOTE — DISCUSSION/SUMMARY
[Medication Risks Reviewed] : Medication risks reviewed [Surgical risks reviewed] : Surgical risks reviewed [de-identified] : 81-year-old female with medial compartment osteoarthritis of the right knee that is failing to resolve with conservative treatment.  She would like to exhaust all conservative treatment options I therefore recommended that she try physical therapy for her right knee.  She should continue take the Celebrex that she was prescribed by her primary doctor as needed for the pain.  She is going to follow-up with one of the spine surgeons for her low back pain.  I will see her back in 6 weeks time for repeat examination.  If no improvement or symptoms at that time we will discuss further a total knee replacement.  All questions asked and answered.

## 2021-11-17 NOTE — HISTORY OF PRESENT ILLNESS
[de-identified] : 81-year-old female here today for follow-up of her right knee pain.  She recently completed a course of gel injections and states that she has had minimal relief of the pain in her knee.  Complains of pain over the medial aspect of the knee especially when standing for long period of time and navigating stairs.  Denies any neurovascular compromise in the leg.  Denies any trauma.

## 2021-11-22 ENCOUNTER — APPOINTMENT (OUTPATIENT)
Dept: NEUROSURGERY | Facility: CLINIC | Age: 81
End: 2021-11-22
Payer: MEDICARE

## 2021-11-22 VITALS
TEMPERATURE: 96.9 F | HEART RATE: 90 BPM | DIASTOLIC BLOOD PRESSURE: 83 MMHG | BODY MASS INDEX: 29.02 KG/M2 | HEIGHT: 64 IN | SYSTOLIC BLOOD PRESSURE: 128 MMHG | WEIGHT: 170 LBS | OXYGEN SATURATION: 94 %

## 2021-11-22 PROCEDURE — 99214 OFFICE O/P EST MOD 30 MIN: CPT

## 2021-12-02 DIAGNOSIS — Z23 ENCOUNTER FOR IMMUNIZATION: ICD-10-CM

## 2021-12-03 ENCOUNTER — APPOINTMENT (OUTPATIENT)
Dept: FAMILY MEDICINE | Facility: CLINIC | Age: 81
End: 2021-12-03
Payer: MEDICARE

## 2021-12-03 PROCEDURE — 0003A: CPT

## 2021-12-09 ENCOUNTER — NON-APPOINTMENT (OUTPATIENT)
Age: 81
End: 2021-12-09

## 2021-12-09 ENCOUNTER — APPOINTMENT (OUTPATIENT)
Dept: CARDIOLOGY | Facility: CLINIC | Age: 81
End: 2021-12-09
Payer: MEDICARE

## 2021-12-09 VITALS
SYSTOLIC BLOOD PRESSURE: 140 MMHG | DIASTOLIC BLOOD PRESSURE: 80 MMHG | WEIGHT: 170 LBS | OXYGEN SATURATION: 97 % | HEART RATE: 82 BPM | BODY MASS INDEX: 29.02 KG/M2 | HEIGHT: 64 IN

## 2021-12-09 PROCEDURE — 93000 ELECTROCARDIOGRAM COMPLETE: CPT

## 2021-12-09 PROCEDURE — 99214 OFFICE O/P EST MOD 30 MIN: CPT | Mod: 25

## 2021-12-09 NOTE — DISCUSSION/SUMMARY
[FreeTextEntry1] : Pt is a 80 y/o F with PMH CAD s/p 3V CABG 12/2016, HTN, HLD who presents today for evaluation prior to laminectomy 12/21/2021 with Dr Varma\par Nuclear stress test 12/2018 fair exercise tolerance, normal myocardial perfusion\par TTE 11/2018 EF 50-55% with inf wall hypokinesis, mild DD, mild TR\par Will need to repeat TTE \par Further preop recommendations will be made once diagnostic testing is complete. \par c/w metoprolol, lisinopril, amlodipine\par c/w ASA and statin for CAD history\par \par The described plan was discussed with the pt.  All questions and concerns were addressed to the best of my knowledge.\par

## 2021-12-09 NOTE — HISTORY OF PRESENT ILLNESS
[FreeTextEntry1] : 81 year old female with a history of 3 vessel CABG including LIMA to LAD for triple vessel CAD with systolic function 45-50% seen on TTE 01/2017.  Repeat TTE 2018 shows low-normal LV function with mild valvular regurgitation.  She has done well post-operatively and is compliant with meds. \par COVID vaccine 05/2021 Pfizer\par \par She is scheduled for a laminectomy 12/21/2021 with Dr Varma at .  She is feeling well from a cardiac standpoint - denies CP, SOB, diaphoresis, palpitations, dizziness, syncope, LE edema, PND, orthopnea. \par She is not very active right now due to back pain.  She climbs 1 flight of stairs without cardiac symptoms\par \par Nuclear stress test 12/2018 fair exercise tolerance, normal myocardial perfusion\par TTE 11/2018 EF 50-55% with inf wall hypokinesis, mild DD, mild TR\par \par \par

## 2021-12-10 ENCOUNTER — OUTPATIENT (OUTPATIENT)
Dept: OUTPATIENT SERVICES | Facility: HOSPITAL | Age: 81
LOS: 1 days | End: 2021-12-10
Payer: MEDICARE

## 2021-12-10 VITALS
RESPIRATION RATE: 16 BRPM | DIASTOLIC BLOOD PRESSURE: 72 MMHG | TEMPERATURE: 98 F | WEIGHT: 171.96 LBS | HEIGHT: 62 IN | SYSTOLIC BLOOD PRESSURE: 130 MMHG | HEART RATE: 72 BPM

## 2021-12-10 DIAGNOSIS — K62.89 OTHER SPECIFIED DISEASES OF ANUS AND RECTUM: Chronic | ICD-10-CM

## 2021-12-10 DIAGNOSIS — I10 ESSENTIAL (PRIMARY) HYPERTENSION: ICD-10-CM

## 2021-12-10 DIAGNOSIS — M48.062 SPINAL STENOSIS, LUMBAR REGION WITH NEUROGENIC CLAUDICATION: ICD-10-CM

## 2021-12-10 DIAGNOSIS — M47.816 SPONDYLOSIS WITHOUT MYELOPATHY OR RADICULOPATHY, LUMBAR REGION: ICD-10-CM

## 2021-12-10 DIAGNOSIS — Z95.1 PRESENCE OF AORTOCORONARY BYPASS GRAFT: Chronic | ICD-10-CM

## 2021-12-10 DIAGNOSIS — M48.061 SPINAL STENOSIS, LUMBAR REGION WITHOUT NEUROGENIC CLAUDICATION: ICD-10-CM

## 2021-12-10 DIAGNOSIS — Z98.49 CATARACT EXTRACTION STATUS, UNSPECIFIED EYE: Chronic | ICD-10-CM

## 2021-12-10 LAB
A1C WITH ESTIMATED AVERAGE GLUCOSE RESULT: 6 % — HIGH (ref 4–5.6)
ALBUMIN SERPL ELPH-MCNC: 3.5 G/DL — SIGNIFICANT CHANGE UP (ref 3.3–5)
ANION GAP SERPL CALC-SCNC: 5 MMOL/L — SIGNIFICANT CHANGE UP (ref 5–17)
APPEARANCE UR: ABNORMAL
APTT BLD: 30.7 SEC — SIGNIFICANT CHANGE UP (ref 27.5–35.5)
BASOPHILS # BLD AUTO: 0.07 K/UL — SIGNIFICANT CHANGE UP (ref 0–0.2)
BASOPHILS NFR BLD AUTO: 0.8 % — SIGNIFICANT CHANGE UP (ref 0–2)
BILIRUB UR-MCNC: NEGATIVE — SIGNIFICANT CHANGE UP
BUN SERPL-MCNC: 18 MG/DL — SIGNIFICANT CHANGE UP (ref 7–23)
CALCIUM SERPL-MCNC: 9 MG/DL — SIGNIFICANT CHANGE UP (ref 8.5–10.1)
CHLORIDE SERPL-SCNC: 112 MMOL/L — HIGH (ref 96–108)
CO2 SERPL-SCNC: 26 MMOL/L — SIGNIFICANT CHANGE UP (ref 22–31)
COLOR SPEC: YELLOW — SIGNIFICANT CHANGE UP
CREAT SERPL-MCNC: 0.88 MG/DL — SIGNIFICANT CHANGE UP (ref 0.5–1.3)
DIFF PNL FLD: ABNORMAL
EOSINOPHIL # BLD AUTO: 0.29 K/UL — SIGNIFICANT CHANGE UP (ref 0–0.5)
EOSINOPHIL NFR BLD AUTO: 3.4 % — SIGNIFICANT CHANGE UP (ref 0–6)
ESTIMATED AVERAGE GLUCOSE: 126 MG/DL — HIGH (ref 68–114)
GLUCOSE SERPL-MCNC: 101 MG/DL — HIGH (ref 70–99)
GLUCOSE UR QL: NEGATIVE MG/DL — SIGNIFICANT CHANGE UP
HCT VFR BLD CALC: 42.3 % — SIGNIFICANT CHANGE UP (ref 34.5–45)
HGB BLD-MCNC: 12.9 G/DL — SIGNIFICANT CHANGE UP (ref 11.5–15.5)
IMM GRANULOCYTES NFR BLD AUTO: 0.6 % — SIGNIFICANT CHANGE UP (ref 0–1.5)
INR BLD: 1.03 RATIO — SIGNIFICANT CHANGE UP (ref 0.88–1.16)
KETONES UR-MCNC: ABNORMAL
LEUKOCYTE ESTERASE UR-ACNC: ABNORMAL
LYMPHOCYTES # BLD AUTO: 2.91 K/UL — SIGNIFICANT CHANGE UP (ref 1–3.3)
LYMPHOCYTES # BLD AUTO: 34.4 % — SIGNIFICANT CHANGE UP (ref 13–44)
MCHC RBC-ENTMCNC: 27.2 PG — SIGNIFICANT CHANGE UP (ref 27–34)
MCHC RBC-ENTMCNC: 30.5 GM/DL — LOW (ref 32–36)
MCV RBC AUTO: 89.2 FL — SIGNIFICANT CHANGE UP (ref 80–100)
MONOCYTES # BLD AUTO: 0.5 K/UL — SIGNIFICANT CHANGE UP (ref 0–0.9)
MONOCYTES NFR BLD AUTO: 5.9 % — SIGNIFICANT CHANGE UP (ref 2–14)
NEUTROPHILS # BLD AUTO: 4.64 K/UL — SIGNIFICANT CHANGE UP (ref 1.8–7.4)
NEUTROPHILS NFR BLD AUTO: 54.9 % — SIGNIFICANT CHANGE UP (ref 43–77)
NITRITE UR-MCNC: NEGATIVE — SIGNIFICANT CHANGE UP
PH UR: 5 — SIGNIFICANT CHANGE UP (ref 5–8)
PLATELET # BLD AUTO: 237 K/UL — SIGNIFICANT CHANGE UP (ref 150–400)
POTASSIUM SERPL-MCNC: 4.6 MMOL/L — SIGNIFICANT CHANGE UP (ref 3.5–5.3)
POTASSIUM SERPL-SCNC: 4.6 MMOL/L — SIGNIFICANT CHANGE UP (ref 3.5–5.3)
PROT UR-MCNC: 30 MG/DL
PROTHROM AB SERPL-ACNC: 11.9 SEC — SIGNIFICANT CHANGE UP (ref 10.6–13.6)
RBC # BLD: 4.74 M/UL — SIGNIFICANT CHANGE UP (ref 3.8–5.2)
RBC # FLD: 13.7 % — SIGNIFICANT CHANGE UP (ref 10.3–14.5)
SODIUM SERPL-SCNC: 143 MMOL/L — SIGNIFICANT CHANGE UP (ref 135–145)
SP GR SPEC: 1.02 — SIGNIFICANT CHANGE UP (ref 1.01–1.02)
UROBILINOGEN FLD QL: NEGATIVE MG/DL — SIGNIFICANT CHANGE UP
WBC # BLD: 8.46 K/UL — SIGNIFICANT CHANGE UP (ref 3.8–10.5)
WBC # FLD AUTO: 8.46 K/UL — SIGNIFICANT CHANGE UP (ref 3.8–10.5)

## 2021-12-10 PROCEDURE — 81001 URINALYSIS AUTO W/SCOPE: CPT

## 2021-12-10 PROCEDURE — 87640 STAPH A DNA AMP PROBE: CPT

## 2021-12-10 PROCEDURE — 86850 RBC ANTIBODY SCREEN: CPT

## 2021-12-10 PROCEDURE — 36415 COLL VENOUS BLD VENIPUNCTURE: CPT

## 2021-12-10 PROCEDURE — 86900 BLOOD TYPING SEROLOGIC ABO: CPT

## 2021-12-10 PROCEDURE — 85610 PROTHROMBIN TIME: CPT

## 2021-12-10 PROCEDURE — 85730 THROMBOPLASTIN TIME PARTIAL: CPT

## 2021-12-10 PROCEDURE — 80048 BASIC METABOLIC PNL TOTAL CA: CPT

## 2021-12-10 PROCEDURE — 83036 HEMOGLOBIN GLYCOSYLATED A1C: CPT

## 2021-12-10 PROCEDURE — 86901 BLOOD TYPING SEROLOGIC RH(D): CPT

## 2021-12-10 PROCEDURE — 87641 MR-STAPH DNA AMP PROBE: CPT

## 2021-12-10 PROCEDURE — 82040 ASSAY OF SERUM ALBUMIN: CPT

## 2021-12-10 PROCEDURE — 85025 COMPLETE CBC W/AUTO DIFF WBC: CPT

## 2021-12-10 PROCEDURE — G0463: CPT | Mod: 25

## 2021-12-10 NOTE — H&P PST ADULT - NSICDXPASTSURGICALHX_GEN_ALL_CORE_FT
PAST SURGICAL HISTORY:  S/P cataract surgery     S/P triple vessel bypass      PAST SURGICAL HISTORY:  Rectal mass removed    S/P cataract surgery     S/P triple vessel bypass

## 2021-12-10 NOTE — H&P PST ADULT - HEALTH CARE MAINTENANCE
no recent travels outside of the country or states within the last 14 days, no fever, URI, SOB or recent change /loss in smell or taste no recent travels outside of the country or states within the last 14 days, no fever, URI, SOB or recent change /loss in smell or taste  flu vaccine up to date

## 2021-12-10 NOTE — H&P PST ADULT - GASTROINTESTINAL DETAILS
"Pt to unit by wheelchair. Pt is lethargic but wakens up to answer questions. Pt is very agitated and verbally aggressive to staff stating \"I am this fucking close to snapping with ya'll.\" assessment and admission profile completed. BP elevated, medicated per MAR. Tele initiated: SR at 65 bpm with BBB. Pt's main complaint is left leg cramping when pt was OOB. Oxygen is >90% on 2 L per NC. Labs collected and sent. Pt educated on POC and call light system use, call light in reach.   " soft/nontender/no distention/bowel sounds normal

## 2021-12-10 NOTE — H&P PST ADULT - NSICDXFAMILYHX_GEN_ALL_CORE_FT
non affiliated
FAMILY HISTORY:  Father  Still living? No  Family history of lung cancer, Age at diagnosis: Age Unknown    Mother  Still living? No  Family history of CHF (congestive heart failure), Age at diagnosis: Age Unknown

## 2021-12-10 NOTE — H&P PST ADULT - HISTORY OF PRESENT ILLNESS
82 y/o female with hx of HTN, HLD present to PST for scheduled l3 and l4 laminectomy on 12/21/21. Patient reports lower back pain radiating to upper left back for last few months. Patient reports multiple injections to spine for pain with no relief.

## 2021-12-10 NOTE — H&P PST ADULT - NSICDXPASTMEDICALHX_GEN_ALL_CORE_FT
PAST MEDICAL HISTORY:  Angina pectoris     Anxiety     Chronic back pain     History of COPD     Hyperlipidemia     Hypertension     Rectal cancer     Rectal mass      PAST MEDICAL HISTORY:  Angina pectoris     Anxiety     Chronic back pain     History of COPD     Hyperlipidemia     Hypertension     Lumbar stenosis     Rectal mass

## 2021-12-10 NOTE — H&P PST ADULT - PROBLEM SELECTOR PLAN 1
Pre op and chlorhexidine instructions given and explained.  Avoid NSAIDs and OTC supplements.   Patient verbalized understanding  medical consult requested by surgeon  covid 19 swab scheduled, advised to quarantine after test 12/14/21

## 2021-12-11 DIAGNOSIS — M47.816 SPONDYLOSIS WITHOUT MYELOPATHY OR RADICULOPATHY, LUMBAR REGION: ICD-10-CM

## 2021-12-11 DIAGNOSIS — M48.062 SPINAL STENOSIS, LUMBAR REGION WITH NEUROGENIC CLAUDICATION: ICD-10-CM

## 2021-12-11 LAB
MRSA PCR RESULT.: SIGNIFICANT CHANGE UP
S AUREUS DNA NOSE QL NAA+PROBE: SIGNIFICANT CHANGE UP

## 2021-12-13 ENCOUNTER — APPOINTMENT (OUTPATIENT)
Dept: CARDIOLOGY | Facility: CLINIC | Age: 81
End: 2021-12-13

## 2021-12-14 ENCOUNTER — APPOINTMENT (OUTPATIENT)
Dept: FAMILY MEDICINE | Facility: CLINIC | Age: 81
End: 2021-12-14
Payer: MEDICARE

## 2021-12-14 VITALS
TEMPERATURE: 97.2 F | WEIGHT: 172 LBS | HEIGHT: 64 IN | SYSTOLIC BLOOD PRESSURE: 146 MMHG | HEART RATE: 79 BPM | OXYGEN SATURATION: 95 % | DIASTOLIC BLOOD PRESSURE: 80 MMHG | BODY MASS INDEX: 29.37 KG/M2

## 2021-12-14 VITALS — DIASTOLIC BLOOD PRESSURE: 88 MMHG | SYSTOLIC BLOOD PRESSURE: 136 MMHG

## 2021-12-14 DIAGNOSIS — N39.0 URINARY TRACT INFECTION, SITE NOT SPECIFIED: ICD-10-CM

## 2021-12-14 PROCEDURE — 99214 OFFICE O/P EST MOD 30 MIN: CPT

## 2021-12-15 NOTE — CONSULT LETTER
[Dear  ___] : Dear  [unfilled], [Courtesy Letter:] : I had the pleasure of seeing your patient, [unfilled], in my office today. [Sincerely,] : Sincerely, [FreeTextEntry2] : John Reyes,MD\par [address]\[place] [address] \par \par  [FreeTextEntry1] : This very pleasant 81-year-old woman with a significant history of progressive back pain with neurogenic claudication returns for further evaluation regarding possible surgical intervention.  It is now just over 2 months since we saw the patient previously.  The patient has now undergone a further round of interventions by pain management including epidural steroid injections at L3-4 and L4-5.  The patient reports no specific improvement with her symptoms.  The patient continues to have right-sided knee pain which she is following with orthopedic surgery.  Overall the patient has no change in her symptom profile.  For more than 2years the patient has been troubled with increasing pain numbness and weakness in both legs with minimal periods of standing or walking.  She is now very limited in her distances of ambulation to approximately 25 yards or so before she needs to take a rest.  She has had near falls because of the weakness but has not had any specific traumas.  Over the last 2 years the patient has undergone appropriate conservative treatment including multiple rounds of epidural steroid injections, chiropractic care including traction and physical therapy.  None of these interventions have provided anything except a very minor temporal relief.\par \par I have once again reviewed with the patient her MRI images from [facility] radiology.  These show extensive degenerative spondylosis with a central and lateral recess stenosis at L3-4 and L4-5.  There is grade 1 spondylolisthesis at both levels.  X-rays do not demonstrate any dynamic instability at these levels.\par \par There has been no specific change in the patient's neurologic examination since our previous assessment in early August.  The patient has muscular discomfort in the lower lumbosacral junction.  Straight leg raise is negative bilaterally.  The patient has intact hip flexion, knee extension, and dorsi and plantar flexion.  At rest there is no objective findings of decreased sensation to cool temperature and pinprick.  Once again the patient is clear to point out that she develops profound numbness in the top of her foot lateral calf and feels weakness at the ankles in particular.\par \par The patient has severe degenerative spondylotic stenosis with a spondylolisthesis at L3-4 and L4-5.  Her symptoms pattern of neurogenic claudication correlates well with the anatomic degenerative findings.  She has undergone appropriate conservative measures without any sustained benefit.  Based on the anatomic changes further conservative measures would not result in any improvement in her symptoms.  As a result she is most appropriate for a decompressive laminectomy procedure.  I have reviewed with the patient in detail the technique of an L3 and L4 total laminectomy procedure.  We have also reviewed the potential risks and complications of the surgery including those of general anesthesia, cerebrospinal fluid leak, new or persistent neurologic injury, the potential for persistent lower back pain, the potential need for fusion at a later date, infection, postoperative hematoma, and postoperative pain and recovery expectations.  At the end of our discussion the patient indicated good understanding and that all questions have been addressed.\par \par Thank you for very kindly including me in the evaluation and treatment of your patient.  Please do not hesitate to contact me should you have any concerns or questions regarding this evaluation, the patient's diagnosis of severe lumbar degenerative spondylotic stenosis, or the recommendation for a two-level L3 and L4 total laminectomy surgery. [FreeTextEntry3] : Tyron Varma MD, PhD, FRCPSC \par Attending Neurosurgeon \par  of Neurosurgery \par [facility] \par [address], [address]nd floor \[place] [address] \par Office: [phone] \par Fax: [phone]\par \par

## 2021-12-16 LAB — BACTERIA UR CULT: NORMAL

## 2021-12-16 NOTE — HISTORY OF PRESENT ILLNESS
[Coronary Artery Disease] : coronary artery disease [COPD] : COPD [No Adverse Anesthesia Reaction] : no adverse anesthesia reaction in self or family member [(Patient denies any chest pain, claudication, dyspnea on exertion, orthopnea, palpitations or syncope)] : Patient denies any chest pain, claudication, dyspnea on exertion, orthopnea, palpitations or syncope [Moderate (4-6 METs)] : Moderate (4-6 METs) [Chronic Kidney Disease] : chronic kidney disease [Chronic Anticoagulation] : no chronic anticoagulation [Diabetes] : no diabetes [FreeTextEntry1] :  laminectomy [FreeTextEntry2] : 12/21/21 [FreeTextEntry3] : Dr. Varma [FreeTextEntry4] : Pt in office for medical clearance. Pt to go for procedure for chronic back pain will be going for laminectomy. Pt denies chest pain, palpitations, dyspnea, fever, chills, nausea, or vomiting. Pt has hx of CAD s/p CABG x 3 vessels, pt sees cardio Dr. Salgado, has obtained cardiac clearance thru her cardiologist. Pt has hx of COPD, follows with pulm, not currently on inhaler regimen. Denies CP, palpitations, dyspnea, n/v\par Pt has muscle spasms, helped w/ flexeril.\par Pt on celecoxib for chronic knee and back pain. Advised pt to stop nsaids 1 week prior to surgery

## 2021-12-16 NOTE — ASSESSMENT
[Patient Optimized for Surgery] : Patient optimized for surgery [FreeTextEntry4] : Pt is medically optimized for procedure at this time pending urine cx. Pt has hx of cardiomyopathy and CAD s/p CABG x 3, pt will obtain cardiac clearance from cardio Dr. Salgado. Pt has COPD, monitor for bronchospasm perioperatively. Pt started on bactrim course. Urine cx sent. Stop NSAIDs except for ASA 1 week prior to surgery.\par \par CKD stage 3a - stable\par \par Addendum 12/16 urine cx neg. Pt is medically optimized for procedure at this time.

## 2021-12-16 NOTE — RESULTS/DATA
[] : results reviewed [de-identified] : a1c 6.0 [de-identified] : UA+leuk, bacteria\par urine cx neg

## 2021-12-20 ENCOUNTER — APPOINTMENT (OUTPATIENT)
Dept: CARDIOLOGY | Facility: CLINIC | Age: 81
End: 2021-12-20
Payer: MEDICARE

## 2021-12-20 PROBLEM — M48.061 SPINAL STENOSIS, LUMBAR REGION WITHOUT NEUROGENIC CLAUDICATION: Chronic | Status: ACTIVE | Noted: 2021-12-10

## 2021-12-20 PROCEDURE — 93306 TTE W/DOPPLER COMPLETE: CPT

## 2021-12-20 RX ORDER — ONDANSETRON 8 MG/1
4 TABLET, FILM COATED ORAL ONCE
Refills: 0 | Status: DISCONTINUED | OUTPATIENT
Start: 2021-12-21 | End: 2021-12-21

## 2021-12-20 RX ORDER — FENTANYL CITRATE 50 UG/ML
50 INJECTION INTRAVENOUS
Refills: 0 | Status: DISCONTINUED | OUTPATIENT
Start: 2021-12-21 | End: 2021-12-21

## 2021-12-20 RX ORDER — HYDROMORPHONE HYDROCHLORIDE 2 MG/ML
0.5 INJECTION INTRAMUSCULAR; INTRAVENOUS; SUBCUTANEOUS
Refills: 0 | Status: DISCONTINUED | OUTPATIENT
Start: 2021-12-21 | End: 2021-12-21

## 2021-12-20 RX ORDER — MEPERIDINE HYDROCHLORIDE 50 MG/ML
12.5 INJECTION INTRAMUSCULAR; INTRAVENOUS; SUBCUTANEOUS
Refills: 0 | Status: DISCONTINUED | OUTPATIENT
Start: 2021-12-21 | End: 2021-12-21

## 2021-12-20 RX ORDER — SODIUM CHLORIDE 9 MG/ML
1000 INJECTION, SOLUTION INTRAVENOUS
Refills: 0 | Status: DISCONTINUED | OUTPATIENT
Start: 2021-12-21 | End: 2021-12-21

## 2021-12-21 ENCOUNTER — INPATIENT (INPATIENT)
Facility: HOSPITAL | Age: 81
LOS: 1 days | Discharge: HOME | DRG: 460 | End: 2021-12-23
Attending: SPECIALIST | Admitting: SPECIALIST
Payer: MEDICARE

## 2021-12-21 ENCOUNTER — APPOINTMENT (OUTPATIENT)
Dept: NEUROSURGERY | Facility: HOSPITAL | Age: 81
End: 2021-12-21
Payer: MEDICARE

## 2021-12-21 VITALS
HEIGHT: 64 IN | DIASTOLIC BLOOD PRESSURE: 66 MMHG | SYSTOLIC BLOOD PRESSURE: 134 MMHG | RESPIRATION RATE: 15 BRPM | WEIGHT: 169.98 LBS | OXYGEN SATURATION: 96 % | TEMPERATURE: 97 F | HEART RATE: 79 BPM

## 2021-12-21 DIAGNOSIS — Z95.1 PRESENCE OF AORTOCORONARY BYPASS GRAFT: Chronic | ICD-10-CM

## 2021-12-21 DIAGNOSIS — K62.89 OTHER SPECIFIED DISEASES OF ANUS AND RECTUM: Chronic | ICD-10-CM

## 2021-12-21 DIAGNOSIS — M48.062 SPINAL STENOSIS, LUMBAR REGION WITH NEUROGENIC CLAUDICATION: ICD-10-CM

## 2021-12-21 DIAGNOSIS — Z98.49 CATARACT EXTRACTION STATUS, UNSPECIFIED EYE: Chronic | ICD-10-CM

## 2021-12-21 DIAGNOSIS — M47.816 SPONDYLOSIS WITHOUT MYELOPATHY OR RADICULOPATHY, LUMBAR REGION: ICD-10-CM

## 2021-12-21 LAB
ANION GAP SERPL CALC-SCNC: 6 MMOL/L — SIGNIFICANT CHANGE UP (ref 5–17)
BASOPHILS # BLD AUTO: 0.03 K/UL — SIGNIFICANT CHANGE UP (ref 0–0.2)
BASOPHILS NFR BLD AUTO: 0.5 % — SIGNIFICANT CHANGE UP (ref 0–2)
BUN SERPL-MCNC: 16 MG/DL — SIGNIFICANT CHANGE UP (ref 7–23)
CALCIUM SERPL-MCNC: 9 MG/DL — SIGNIFICANT CHANGE UP (ref 8.5–10.1)
CHLORIDE SERPL-SCNC: 107 MMOL/L — SIGNIFICANT CHANGE UP (ref 96–108)
CO2 SERPL-SCNC: 26 MMOL/L — SIGNIFICANT CHANGE UP (ref 22–31)
CREAT SERPL-MCNC: 1.13 MG/DL — SIGNIFICANT CHANGE UP (ref 0.5–1.3)
EOSINOPHIL # BLD AUTO: 0.03 K/UL — SIGNIFICANT CHANGE UP (ref 0–0.5)
EOSINOPHIL NFR BLD AUTO: 0.5 % — SIGNIFICANT CHANGE UP (ref 0–6)
GLUCOSE SERPL-MCNC: 153 MG/DL — HIGH (ref 70–99)
HCT VFR BLD CALC: 41.4 % — SIGNIFICANT CHANGE UP (ref 34.5–45)
HGB BLD-MCNC: 12.7 G/DL — SIGNIFICANT CHANGE UP (ref 11.5–15.5)
IMM GRANULOCYTES NFR BLD AUTO: 0.5 % — SIGNIFICANT CHANGE UP (ref 0–1.5)
LYMPHOCYTES # BLD AUTO: 1.05 K/UL — SIGNIFICANT CHANGE UP (ref 1–3.3)
LYMPHOCYTES # BLD AUTO: 16.2 % — SIGNIFICANT CHANGE UP (ref 13–44)
MCHC RBC-ENTMCNC: 27.5 PG — SIGNIFICANT CHANGE UP (ref 27–34)
MCHC RBC-ENTMCNC: 30.7 GM/DL — LOW (ref 32–36)
MCV RBC AUTO: 89.8 FL — SIGNIFICANT CHANGE UP (ref 80–100)
MONOCYTES # BLD AUTO: 0.14 K/UL — SIGNIFICANT CHANGE UP (ref 0–0.9)
MONOCYTES NFR BLD AUTO: 2.2 % — SIGNIFICANT CHANGE UP (ref 2–14)
NEUTROPHILS # BLD AUTO: 5.19 K/UL — SIGNIFICANT CHANGE UP (ref 1.8–7.4)
NEUTROPHILS NFR BLD AUTO: 80.1 % — HIGH (ref 43–77)
PLATELET # BLD AUTO: 190 K/UL — SIGNIFICANT CHANGE UP (ref 150–400)
POTASSIUM SERPL-MCNC: 3.9 MMOL/L — SIGNIFICANT CHANGE UP (ref 3.5–5.3)
POTASSIUM SERPL-SCNC: 3.9 MMOL/L — SIGNIFICANT CHANGE UP (ref 3.5–5.3)
RBC # BLD: 4.61 M/UL — SIGNIFICANT CHANGE UP (ref 3.8–5.2)
RBC # FLD: 13.8 % — SIGNIFICANT CHANGE UP (ref 10.3–14.5)
SODIUM SERPL-SCNC: 139 MMOL/L — SIGNIFICANT CHANGE UP (ref 135–145)
WBC # BLD: 6.47 K/UL — SIGNIFICANT CHANGE UP (ref 3.8–10.5)
WBC # FLD AUTO: 6.47 K/UL — SIGNIFICANT CHANGE UP (ref 3.8–10.5)

## 2021-12-21 PROCEDURE — 63047 LAM FACETEC & FORAMOT LUMBAR: CPT

## 2021-12-21 PROCEDURE — 22612 ARTHRD PST TQ 1NTRSPC LUMBAR: CPT

## 2021-12-21 PROCEDURE — 63048 LAM FACETEC &FORAMOT EA ADDL: CPT

## 2021-12-21 RX ORDER — SENNA PLUS 8.6 MG/1
2 TABLET ORAL AT BEDTIME
Refills: 0 | Status: DISCONTINUED | OUTPATIENT
Start: 2021-12-21 | End: 2021-12-23

## 2021-12-21 RX ORDER — DIAZEPAM 5 MG
5 TABLET ORAL EVERY 12 HOURS
Refills: 0 | Status: DISCONTINUED | OUTPATIENT
Start: 2021-12-21 | End: 2021-12-21

## 2021-12-21 RX ORDER — OXYCODONE HYDROCHLORIDE 5 MG/1
10 TABLET ORAL EVERY 4 HOURS
Refills: 0 | Status: DISCONTINUED | OUTPATIENT
Start: 2021-12-21 | End: 2021-12-23

## 2021-12-21 RX ORDER — CEFAZOLIN SODIUM 1 G
2000 VIAL (EA) INJECTION EVERY 8 HOURS
Refills: 0 | Status: COMPLETED | OUTPATIENT
Start: 2021-12-21 | End: 2021-12-22

## 2021-12-21 RX ORDER — AMLODIPINE BESYLATE 2.5 MG/1
5 TABLET ORAL DAILY
Refills: 0 | Status: DISCONTINUED | OUTPATIENT
Start: 2021-12-21 | End: 2021-12-23

## 2021-12-21 RX ORDER — ALPRAZOLAM 0.25 MG
0.25 TABLET ORAL AT BEDTIME
Refills: 0 | Status: DISCONTINUED | OUTPATIENT
Start: 2021-12-21 | End: 2021-12-23

## 2021-12-21 RX ORDER — ACETAMINOPHEN 500 MG
1000 TABLET ORAL ONCE
Refills: 0 | Status: COMPLETED | OUTPATIENT
Start: 2021-12-21 | End: 2021-12-21

## 2021-12-21 RX ORDER — ACETAMINOPHEN 500 MG
650 TABLET ORAL EVERY 6 HOURS
Refills: 0 | Status: DISCONTINUED | OUTPATIENT
Start: 2021-12-21 | End: 2021-12-21

## 2021-12-21 RX ORDER — ACETAMINOPHEN 500 MG
1000 TABLET ORAL ONCE
Refills: 0 | Status: DISCONTINUED | OUTPATIENT
Start: 2021-12-21 | End: 2021-12-23

## 2021-12-21 RX ORDER — ATORVASTATIN CALCIUM 80 MG/1
40 TABLET, FILM COATED ORAL AT BEDTIME
Refills: 0 | Status: DISCONTINUED | OUTPATIENT
Start: 2021-12-21 | End: 2021-12-23

## 2021-12-21 RX ORDER — METOCLOPRAMIDE HCL 10 MG
10 TABLET ORAL EVERY 8 HOURS
Refills: 0 | Status: DISCONTINUED | OUTPATIENT
Start: 2021-12-21 | End: 2021-12-23

## 2021-12-21 RX ORDER — METOPROLOL TARTRATE 50 MG
100 TABLET ORAL DAILY
Refills: 0 | Status: DISCONTINUED | OUTPATIENT
Start: 2021-12-21 | End: 2021-12-23

## 2021-12-21 RX ORDER — OXYCODONE HYDROCHLORIDE 5 MG/1
5 TABLET ORAL EVERY 4 HOURS
Refills: 0 | Status: DISCONTINUED | OUTPATIENT
Start: 2021-12-21 | End: 2021-12-23

## 2021-12-21 RX ORDER — LISINOPRIL 2.5 MG/1
40 TABLET ORAL DAILY
Refills: 0 | Status: DISCONTINUED | OUTPATIENT
Start: 2021-12-21 | End: 2021-12-23

## 2021-12-21 RX ORDER — METOCLOPRAMIDE HCL 10 MG
10 TABLET ORAL ONCE
Refills: 0 | Status: COMPLETED | OUTPATIENT
Start: 2021-12-21 | End: 2021-12-21

## 2021-12-21 RX ORDER — FAMOTIDINE 10 MG/ML
20 INJECTION INTRAVENOUS EVERY 12 HOURS
Refills: 0 | Status: DISCONTINUED | OUTPATIENT
Start: 2021-12-21 | End: 2021-12-23

## 2021-12-21 RX ORDER — DIPHENHYDRAMINE HCL 50 MG
12.5 CAPSULE ORAL EVERY 4 HOURS
Refills: 0 | Status: DISCONTINUED | OUTPATIENT
Start: 2021-12-21 | End: 2021-12-23

## 2021-12-21 RX ORDER — ONDANSETRON 8 MG/1
4 TABLET, FILM COATED ORAL EVERY 6 HOURS
Refills: 0 | Status: DISCONTINUED | OUTPATIENT
Start: 2021-12-21 | End: 2021-12-23

## 2021-12-21 RX ORDER — METOCLOPRAMIDE HCL 10 MG
10 TABLET ORAL EVERY 8 HOURS
Refills: 0 | Status: DISCONTINUED | OUTPATIENT
Start: 2021-12-21 | End: 2021-12-21

## 2021-12-21 RX ORDER — CYCLOBENZAPRINE HYDROCHLORIDE 10 MG/1
10 TABLET, FILM COATED ORAL EVERY 8 HOURS
Refills: 0 | Status: DISCONTINUED | OUTPATIENT
Start: 2021-12-21 | End: 2021-12-23

## 2021-12-21 RX ORDER — HYDROMORPHONE HYDROCHLORIDE 2 MG/ML
1 INJECTION INTRAMUSCULAR; INTRAVENOUS; SUBCUTANEOUS EVERY 4 HOURS
Refills: 0 | Status: DISCONTINUED | OUTPATIENT
Start: 2021-12-21 | End: 2021-12-23

## 2021-12-21 RX ADMIN — Medication 10 MILLIGRAM(S): at 22:57

## 2021-12-21 RX ADMIN — HYDROMORPHONE HYDROCHLORIDE 1 MILLIGRAM(S): 2 INJECTION INTRAMUSCULAR; INTRAVENOUS; SUBCUTANEOUS at 16:40

## 2021-12-21 RX ADMIN — Medication 10 MILLIGRAM(S): at 16:53

## 2021-12-21 RX ADMIN — Medication 100 MILLIGRAM(S): at 16:11

## 2021-12-21 RX ADMIN — HYDROMORPHONE HYDROCHLORIDE 0.5 MILLIGRAM(S): 2 INJECTION INTRAMUSCULAR; INTRAVENOUS; SUBCUTANEOUS at 12:30

## 2021-12-21 RX ADMIN — HYDROMORPHONE HYDROCHLORIDE 0.5 MILLIGRAM(S): 2 INJECTION INTRAMUSCULAR; INTRAVENOUS; SUBCUTANEOUS at 12:00

## 2021-12-21 RX ADMIN — HYDROMORPHONE HYDROCHLORIDE 0.5 MILLIGRAM(S): 2 INJECTION INTRAMUSCULAR; INTRAVENOUS; SUBCUTANEOUS at 12:45

## 2021-12-21 RX ADMIN — CYCLOBENZAPRINE HYDROCHLORIDE 10 MILLIGRAM(S): 10 TABLET, FILM COATED ORAL at 21:41

## 2021-12-21 RX ADMIN — ATORVASTATIN CALCIUM 40 MILLIGRAM(S): 80 TABLET, FILM COATED ORAL at 21:41

## 2021-12-21 RX ADMIN — ONDANSETRON 4 MILLIGRAM(S): 8 TABLET, FILM COATED ORAL at 16:11

## 2021-12-21 RX ADMIN — Medication 1000 MILLIGRAM(S): at 22:57

## 2021-12-21 RX ADMIN — Medication 0.25 MILLIGRAM(S): at 22:56

## 2021-12-21 RX ADMIN — HYDROMORPHONE HYDROCHLORIDE 1 MILLIGRAM(S): 2 INJECTION INTRAMUSCULAR; INTRAVENOUS; SUBCUTANEOUS at 16:11

## 2021-12-21 RX ADMIN — Medication 400 MILLIGRAM(S): at 22:57

## 2021-12-21 NOTE — BRIEF OPERATIVE NOTE - OPERATION/FINDINGS
L3 and L4 total laminectomy, repair of durotomy, L3/4 posterolateral only fusion with autologous bone.

## 2021-12-21 NOTE — BRIEF OPERATIVE NOTE - NSICDXBRIEFPROCEDURE_GEN_ALL_CORE_FT
PROCEDURES:  Laminectomy or decompressive laminectomy, lumbar, 2 levels 21-Dec-2021 11:05:50  Tyron Varma

## 2021-12-21 NOTE — ASU PATIENT PROFILE, ADULT - NSICDXPASTMEDICALHX_GEN_ALL_CORE_FT
PAST MEDICAL HISTORY:  Angina pectoris     Anxiety     Chronic back pain     History of COPD     Hyperlipidemia     Hypertension     Lumbar stenosis     Rectal mass

## 2021-12-21 NOTE — BRIEF OPERATIVE NOTE - NSICDXBRIEFPREOP_GEN_ALL_CORE_FT
PRE-OP DIAGNOSIS:  Lumbar spondylosis 21-Dec-2021 11:07:07  Tyron Varma  Spinal stenosis of lumbar region with neurogenic claudication 21-Dec-2021 11:07:22  Tyron Varma  Spondylolisthesis at L3-L4 level 21-Dec-2021 11:07:40  Tyron Varma

## 2021-12-21 NOTE — ASU PREOP CHECKLIST - AS BP NONINV SITE
left upper arm
I will SWITCH the dose or number of times a day I take the medications listed below when I get home from the hospital:  None

## 2021-12-21 NOTE — BRIEF OPERATIVE NOTE - NSICDXBRIEFPOSTOP_GEN_ALL_CORE_FT
POST-OP DIAGNOSIS:  Lumbar spondylosis 21-Dec-2021 11:07:56  Tyron Varma  Lumbar stenosis with neurogenic claudication 21-Dec-2021 11:08:11  Tyron Varma  Spondylolisthesis at L3-L4 level 21-Dec-2021 11:08:24  Tyron Varma

## 2021-12-21 NOTE — ASU PATIENT PROFILE, ADULT - FALL HARM RISK - UNIVERSAL INTERVENTIONS
Bed in lowest position, wheels locked, appropriate side rails in place/Call bell, personal items and telephone in reach/Instruct patient to call for assistance before getting out of bed or chair/Non-slip footwear when patient is out of bed/[place] to call system/Physically safe environment - no spills, clutter or unnecessary equipment/Purposeful Proactive Rounding/Room/bathroom lighting operational, light cord in reach

## 2021-12-21 NOTE — ASU PATIENT PROFILE, ADULT - NSICDXPASTSURGICALHX_GEN_ALL_CORE_FT
PAST SURGICAL HISTORY:  Rectal mass removed    S/P cataract surgery     S/P triple vessel bypass

## 2021-12-22 LAB
ANION GAP SERPL CALC-SCNC: 7 MMOL/L — SIGNIFICANT CHANGE UP (ref 5–17)
BASOPHILS # BLD AUTO: 0.01 K/UL — SIGNIFICANT CHANGE UP (ref 0–0.2)
BASOPHILS NFR BLD AUTO: 0.1 % — SIGNIFICANT CHANGE UP (ref 0–2)
BUN SERPL-MCNC: 14 MG/DL — SIGNIFICANT CHANGE UP (ref 7–23)
CALCIUM SERPL-MCNC: 8.6 MG/DL — SIGNIFICANT CHANGE UP (ref 8.5–10.1)
CHLORIDE SERPL-SCNC: 109 MMOL/L — HIGH (ref 96–108)
CO2 SERPL-SCNC: 25 MMOL/L — SIGNIFICANT CHANGE UP (ref 22–31)
CREAT SERPL-MCNC: 0.86 MG/DL — SIGNIFICANT CHANGE UP (ref 0.5–1.3)
EOSINOPHIL # BLD AUTO: 0 K/UL — SIGNIFICANT CHANGE UP (ref 0–0.5)
EOSINOPHIL NFR BLD AUTO: 0 % — SIGNIFICANT CHANGE UP (ref 0–6)
GLUCOSE SERPL-MCNC: 140 MG/DL — HIGH (ref 70–99)
HCT VFR BLD CALC: 39.2 % — SIGNIFICANT CHANGE UP (ref 34.5–45)
HGB BLD-MCNC: 12 G/DL — SIGNIFICANT CHANGE UP (ref 11.5–15.5)
IMM GRANULOCYTES NFR BLD AUTO: 0.6 % — SIGNIFICANT CHANGE UP (ref 0–1.5)
LYMPHOCYTES # BLD AUTO: 1.48 K/UL — SIGNIFICANT CHANGE UP (ref 1–3.3)
LYMPHOCYTES # BLD AUTO: 13.4 % — SIGNIFICANT CHANGE UP (ref 13–44)
MCHC RBC-ENTMCNC: 27.5 PG — SIGNIFICANT CHANGE UP (ref 27–34)
MCHC RBC-ENTMCNC: 30.6 GM/DL — LOW (ref 32–36)
MCV RBC AUTO: 89.7 FL — SIGNIFICANT CHANGE UP (ref 80–100)
MONOCYTES # BLD AUTO: 0.5 K/UL — SIGNIFICANT CHANGE UP (ref 0–0.9)
MONOCYTES NFR BLD AUTO: 4.5 % — SIGNIFICANT CHANGE UP (ref 2–14)
NEUTROPHILS # BLD AUTO: 8.96 K/UL — HIGH (ref 1.8–7.4)
NEUTROPHILS NFR BLD AUTO: 81.4 % — HIGH (ref 43–77)
PLATELET # BLD AUTO: 227 K/UL — SIGNIFICANT CHANGE UP (ref 150–400)
POTASSIUM SERPL-MCNC: 4.6 MMOL/L — SIGNIFICANT CHANGE UP (ref 3.5–5.3)
POTASSIUM SERPL-SCNC: 4.6 MMOL/L — SIGNIFICANT CHANGE UP (ref 3.5–5.3)
RBC # BLD: 4.37 M/UL — SIGNIFICANT CHANGE UP (ref 3.8–5.2)
RBC # FLD: 13.5 % — SIGNIFICANT CHANGE UP (ref 10.3–14.5)
SODIUM SERPL-SCNC: 141 MMOL/L — SIGNIFICANT CHANGE UP (ref 135–145)
WBC # BLD: 11.02 K/UL — HIGH (ref 3.8–10.5)
WBC # FLD AUTO: 11.02 K/UL — HIGH (ref 3.8–10.5)

## 2021-12-22 PROCEDURE — 97116 GAIT TRAINING THERAPY: CPT | Mod: GP

## 2021-12-22 PROCEDURE — 82962 GLUCOSE BLOOD TEST: CPT

## 2021-12-22 PROCEDURE — 97530 THERAPEUTIC ACTIVITIES: CPT | Mod: GP

## 2021-12-22 PROCEDURE — 97162 PT EVAL MOD COMPLEX 30 MIN: CPT | Mod: GP

## 2021-12-22 RX ADMIN — OXYCODONE HYDROCHLORIDE 10 MILLIGRAM(S): 5 TABLET ORAL at 01:00

## 2021-12-22 RX ADMIN — CYCLOBENZAPRINE HYDROCHLORIDE 10 MILLIGRAM(S): 10 TABLET, FILM COATED ORAL at 21:53

## 2021-12-22 RX ADMIN — CYCLOBENZAPRINE HYDROCHLORIDE 10 MILLIGRAM(S): 10 TABLET, FILM COATED ORAL at 05:23

## 2021-12-22 RX ADMIN — ONDANSETRON 4 MILLIGRAM(S): 8 TABLET, FILM COATED ORAL at 14:06

## 2021-12-22 RX ADMIN — OXYCODONE HYDROCHLORIDE 10 MILLIGRAM(S): 5 TABLET ORAL at 00:24

## 2021-12-22 RX ADMIN — Medication 100 MILLIGRAM(S): at 00:09

## 2021-12-22 RX ADMIN — OXYCODONE HYDROCHLORIDE 10 MILLIGRAM(S): 5 TABLET ORAL at 15:05

## 2021-12-22 RX ADMIN — ONDANSETRON 4 MILLIGRAM(S): 8 TABLET, FILM COATED ORAL at 08:35

## 2021-12-22 RX ADMIN — Medication 0.25 MILLIGRAM(S): at 21:54

## 2021-12-22 RX ADMIN — OXYCODONE HYDROCHLORIDE 10 MILLIGRAM(S): 5 TABLET ORAL at 08:34

## 2021-12-22 RX ADMIN — ATORVASTATIN CALCIUM 40 MILLIGRAM(S): 80 TABLET, FILM COATED ORAL at 21:53

## 2021-12-22 RX ADMIN — OXYCODONE HYDROCHLORIDE 10 MILLIGRAM(S): 5 TABLET ORAL at 14:07

## 2021-12-22 RX ADMIN — ONDANSETRON 4 MILLIGRAM(S): 8 TABLET, FILM COATED ORAL at 20:04

## 2021-12-22 RX ADMIN — ONDANSETRON 4 MILLIGRAM(S): 8 TABLET, FILM COATED ORAL at 00:25

## 2021-12-22 RX ADMIN — OXYCODONE HYDROCHLORIDE 10 MILLIGRAM(S): 5 TABLET ORAL at 20:04

## 2021-12-22 RX ADMIN — OXYCODONE HYDROCHLORIDE 10 MILLIGRAM(S): 5 TABLET ORAL at 21:30

## 2021-12-22 RX ADMIN — OXYCODONE HYDROCHLORIDE 10 MILLIGRAM(S): 5 TABLET ORAL at 09:30

## 2021-12-22 NOTE — PHYSICAL THERAPY INITIAL EVALUATION ADULT - DID THE PATIENT HAVE SURGERY?
Laminectomy or decompressive laminectomy, lumbar, 2 levels, L3 and L4 total laminectomy, repair of durotomy, L3/4 posterolateral only fusion with autologous bone. 21-Dec-2021 11:05:50  Tyron Varma./yes

## 2021-12-22 NOTE — PHYSICAL THERAPY INITIAL EVALUATION ADULT - MODALITIES TREATMENT COMMENTS
Pt returned to side-lying w/ pillow between legs, +SCD's, CBIR, Pt reports pain decreased to 5/10 after session in side-lying. RN aware.

## 2021-12-22 NOTE — PHYSICAL THERAPY INITIAL EVALUATION ADULT - MD ORDER
small tear in outer dura without noted csf leak which was sutured closed.  Patient flat in bed overnight. OOB w/ PT when pt does not have Headache this afternoon.

## 2021-12-22 NOTE — PHYSICAL THERAPY INITIAL EVALUATION ADULT - GENERAL OBSERVATIONS, REHAB EVAL
Pt seen on 2N, this afternoon, sitting up in bed no headache reported, BP stable, 140/63 before session and 138/73 after session. O2 RA 93-96% after session. dressing c/d/i. Pt reported 10/10 pain decreased to 9/10 pain after session. RN gave meds.

## 2021-12-22 NOTE — PHYSICAL THERAPY INITIAL EVALUATION ADULT - PERTINENT HX OF CURRENT PROBLEM, REHAB EVAL
Pt is a 82 y/o F, To  w/ Lumbar spondylosis, Spinal stenosis of lumbar region with neurogenic claudication, Now s/p Laminectomy or decompressive laminectomy, lumbar, 2 levels, L3 and L4 total laminectomy, repair of durotomy, L3/4 posterolateral only fusion with autologous bone. POD 1 Pt is a 82 y/o F, To  w/ Lumbar spondylosis, Spinal stenosis of lumbar region with neurogenic claudication, came to  for planned sx w/ Dr. Varma. s/p Laminectomy or decompressive laminectomy L3-4, repair of durotomy, L3/4 posterolateral only fusion with autologous bone.  small tear in outer dura without noted csf leak which was sutured closed.  Patient flat in bed overnight.  POD 1

## 2021-12-22 NOTE — PHYSICAL THERAPY INITIAL EVALUATION ADULT - DIAGNOSIS, PT EVAL
s/p POD 1,  L3 and L4 total laminectomy, repair of durotomy, L3/4 posterolateral only fusion with autologous bone.

## 2021-12-23 ENCOUNTER — TRANSCRIPTION ENCOUNTER (OUTPATIENT)
Age: 81
End: 2021-12-23

## 2021-12-23 VITALS
TEMPERATURE: 98 F | DIASTOLIC BLOOD PRESSURE: 68 MMHG | OXYGEN SATURATION: 95 % | SYSTOLIC BLOOD PRESSURE: 152 MMHG | HEART RATE: 99 BPM | RESPIRATION RATE: 16 BRPM

## 2021-12-23 RX ORDER — OXYCODONE HYDROCHLORIDE 5 MG/1
1 TABLET ORAL
Qty: 40 | Refills: 0
Start: 2021-12-23 | End: 2021-12-29

## 2021-12-23 RX ORDER — CYCLOBENZAPRINE HYDROCHLORIDE 10 MG/1
1 TABLET, FILM COATED ORAL
Qty: 42 | Refills: 0
Start: 2021-12-23 | End: 2022-01-05

## 2021-12-23 RX ORDER — SENNA PLUS 8.6 MG/1
2 TABLET ORAL
Qty: 28 | Refills: 0
Start: 2021-12-23 | End: 2022-01-05

## 2021-12-23 RX ORDER — ONDANSETRON 8 MG/1
1 TABLET, FILM COATED ORAL
Qty: 28 | Refills: 0
Start: 2021-12-23 | End: 2021-12-29

## 2021-12-23 RX ADMIN — CYCLOBENZAPRINE HYDROCHLORIDE 10 MILLIGRAM(S): 10 TABLET, FILM COATED ORAL at 13:08

## 2021-12-23 RX ADMIN — OXYCODONE HYDROCHLORIDE 10 MILLIGRAM(S): 5 TABLET ORAL at 13:08

## 2021-12-23 RX ADMIN — OXYCODONE HYDROCHLORIDE 10 MILLIGRAM(S): 5 TABLET ORAL at 13:53

## 2021-12-23 RX ADMIN — OXYCODONE HYDROCHLORIDE 10 MILLIGRAM(S): 5 TABLET ORAL at 01:30

## 2021-12-23 RX ADMIN — ONDANSETRON 4 MILLIGRAM(S): 8 TABLET, FILM COATED ORAL at 09:03

## 2021-12-23 RX ADMIN — AMLODIPINE BESYLATE 5 MILLIGRAM(S): 2.5 TABLET ORAL at 09:03

## 2021-12-23 RX ADMIN — OXYCODONE HYDROCHLORIDE 10 MILLIGRAM(S): 5 TABLET ORAL at 09:03

## 2021-12-23 RX ADMIN — Medication 100 MILLIGRAM(S): at 09:03

## 2021-12-23 RX ADMIN — OXYCODONE HYDROCHLORIDE 10 MILLIGRAM(S): 5 TABLET ORAL at 00:36

## 2021-12-23 RX ADMIN — LISINOPRIL 40 MILLIGRAM(S): 2.5 TABLET ORAL at 09:03

## 2021-12-23 RX ADMIN — CYCLOBENZAPRINE HYDROCHLORIDE 10 MILLIGRAM(S): 10 TABLET, FILM COATED ORAL at 05:13

## 2021-12-23 RX ADMIN — OXYCODONE HYDROCHLORIDE 10 MILLIGRAM(S): 5 TABLET ORAL at 09:35

## 2021-12-23 NOTE — PROGRESS NOTE ADULT - ASSESSMENT
Patient is a 82 y/o female with hx of HTN, HLD, Anxiety present to  for scheduled procedure. She underwent L3-L4 laminectomy, small tear in outer dura without noted csf leak which was sutured closed.      POD#1 s/p L3-L4 laminectomy  - Raise HOB 10 degrees every hour, monitor for headache  - If no complaints sitting upright can get out of bed with PT  - D/c dominguez prior to OOB  - Pain control  - PT/OOB  - SCDs DVT ppx  - Bowel regimen  - Possible d/c home tomorrow    Discussed with Dr. Varma
Patient is a 82 y/o female with hx of HTN, HLD, Anxiety present to  for scheduled procedure. She underwent L3-L4 laminectomy, small tear in outer dura without noted csf leak which was sutured closed.      POD#2 s/p L3-L4 laminectomy  - Continue activity as tolerated  - Ambulating with PT rec d/c home  - Pain control  - PT/OOB  - SCDs DVT ppx  - Bowel regimen  - D/c home tomorrow    Discussed with Dr. Varma

## 2021-12-23 NOTE — DISCHARGE NOTE PROVIDER - NSDCCPCAREPLAN_GEN_ALL_CORE_FT
PRINCIPAL DISCHARGE DIAGNOSIS  Diagnosis: Lumbar stenosis  Assessment and Plan of Treatment: Follow up with Dr. Varma in 2 weeks. Call the office, or visist the nearest ER if you experience redness, swelling, drainage from your wound, fevers >101.3F, sudden weakness of numbness of the legs. You may shower, remove dressing, wash around wound with mild soap, rinse and pat dry. Leave open to air. No tub baths, do not submerge in water. Continue activity as tolerated. Do not lift anything > 10lbs, no bending or twisting of the back. Do not drive under influence of narcotic medication.

## 2021-12-23 NOTE — DISCHARGE NOTE PROVIDER - HOSPITAL COURSE
Patient is a 80 y/o female with hx of HTN, HLD, Anxiety present to  for scheduled procedure. She underwent L3-L4 laminectomy, small tear in outer dura without noted csf leak which was sutured closed.  Patient flat in bed overnight. + dominguez. Patient head of bed raise without issue POD#1, denies headaches. Out of bed with PT, dominguez removed. Patient is voiding, tolerating diet. Ambulating with PT recommending discharge home. Patient is stable from neurosurgical standpoint for discharge.

## 2021-12-23 NOTE — DISCHARGE NOTE PROVIDER - NSDCMRMEDTOKEN_GEN_ALL_CORE_FT
ALPRAZolam 1 mg oral tablet: 1 tab(s) orally 2 times a day, As Needed  amLODIPine 5 mg oral tablet: 1 tab(s) orally once a day  Aspirin Enteric Coated 81 mg oral delayed release tablet: 1 tab(s) orally once a day  atorvastatin 40 mg oral tablet: 1 tab(s) orally once a day  cyclobenzaprine 10 mg oral tablet: 1 tab(s) orally every 8 hours, As Needed -for muscle spasm   lisinopril 40 mg oral tablet: 1 tab(s) orally once a day  metoprolol succinate 100 mg oral capsule, extended release: 1 cap(s) orally once a day  oxyCODONE 5 mg oral tablet: 1 tab(s) orally every 4 to 6 hours, As Needed - for severe pain MDD:6  senna oral tablet: 2 tab(s) orally once a day (at bedtime)

## 2021-12-23 NOTE — DISCHARGE NOTE PROVIDER - NSDCCPTREATMENT_GEN_ALL_CORE_FT
PRINCIPAL PROCEDURE  Procedure: Laminectomy or decompressive laminectomy, lumbar, 2 levels  Findings and Treatment:

## 2021-12-23 NOTE — DISCHARGE NOTE NURSING/CASE MANAGEMENT/SOCIAL WORK - PATIENT PORTAL LINK FT
You can access the FollowMyHealth Patient Portal offered by [facility] by registering at the following website: [email or web address]. By joining [employer]’s FollowMyHealth portal, you will also be able to view your health information using other applications (apps) compatible with our system.

## 2021-12-23 NOTE — DISCHARGE NOTE PROVIDER - CARE PROVIDER_API CALL
Tyron Varma; PhD)  Neurosurgery  [address], [address]nd [place] [address]  Phone: [phone]  Fax: [phone]  Follow Up Time: 2 weeks

## 2021-12-27 ENCOUNTER — NON-APPOINTMENT (OUTPATIENT)
Age: 81
End: 2021-12-27

## 2021-12-30 DIAGNOSIS — M47.26 OTHER SPONDYLOSIS WITH RADICULOPATHY, LUMBAR REGION: ICD-10-CM

## 2021-12-30 DIAGNOSIS — G97.41 ACCIDENTAL PUNCTURE OR LACERATION OF DURA DURING A PROCEDURE: ICD-10-CM

## 2021-12-30 DIAGNOSIS — M48.062 SPINAL STENOSIS, LUMBAR REGION WITH NEUROGENIC CLAUDICATION: ICD-10-CM
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
## 2021-12-30 DIAGNOSIS — M43.16 SPONDYLOLISTHESIS, LUMBAR REGION: ICD-10-CM

## 2021-12-30 DIAGNOSIS — I10 ESSENTIAL (PRIMARY) HYPERTENSION: ICD-10-CM

## 2021-12-30 DIAGNOSIS — J44.9 CHRONIC OBSTRUCTIVE PULMONARY DISEASE, UNSPECIFIED: ICD-10-CM

## 2021-12-30 DIAGNOSIS — Z79.82 LONG TERM (CURRENT) USE OF ASPIRIN: ICD-10-CM

## 2021-12-30 DIAGNOSIS — F41.9 ANXIETY DISORDER, UNSPECIFIED: ICD-10-CM

## 2021-12-30 DIAGNOSIS — E78.5 HYPERLIPIDEMIA, UNSPECIFIED: ICD-10-CM

## 2021-12-31 ENCOUNTER — TRANSCRIPTION ENCOUNTER (OUTPATIENT)
Age: 81
End: 2021-12-31

## 2022-01-03 ENCOUNTER — RX RENEWAL (OUTPATIENT)
Age: 82
End: 2022-01-03

## 2022-01-04 ENCOUNTER — APPOINTMENT (OUTPATIENT)
Dept: NEUROSURGERY | Facility: CLINIC | Age: 82
End: 2022-01-04
Payer: MEDICARE

## 2022-01-04 VITALS
TEMPERATURE: 96.5 F | WEIGHT: 170 LBS | DIASTOLIC BLOOD PRESSURE: 80 MMHG | OXYGEN SATURATION: 96 % | HEIGHT: 64 IN | BODY MASS INDEX: 29.02 KG/M2 | SYSTOLIC BLOOD PRESSURE: 138 MMHG | HEART RATE: 94 BPM

## 2022-01-04 PROCEDURE — 99024 POSTOP FOLLOW-UP VISIT: CPT

## 2022-01-05 NOTE — REASON FOR VISIT
[de-identified] : L3 and L4 total laminectomy  [de-identified] : 12/21/2021 [de-identified] : 2

## 2022-01-05 NOTE — CONSULT LETTER
[Dear  ___] : Dear  [unfilled], [Courtesy Letter:] : I had the pleasure of seeing your patient, [unfilled], in my office today. [Sincerely,] : Sincerely, [FreeTextEntry2] : John Reyes,MD\par [address]\[place] [address]  [FreeTextEntry1] : This very pleasant 81-year-old woman is seen in today for routine postsurgical follow-up.  You may recall that the patient has been suffering for more than 2 years with progressive pattern of neurogenic claudication.  She had symptoms of numbness weakness and balance issues with minimal standing and walking efforts.  Imaging revealed degenerative spondylotic changes with severe central stenosis in the lumbar region at the L3-4 level.  The patient was admitted to [facility] on 21 December 2021 and underwent a total L3 and L4 laminectomy with good bilateral decompression at L2-3, L3-4, and L4-5.  The patient was discharged home without complication.  The patient indicates she has some expected soreness in the incisional area as well as some anterior thigh numbness and pain.  This second finding is very common related to positioning of the patient in the operating room and will dissipate with time.  The patient denies any new weakness or numbness.  In fact the patient states that her neurogenic symptoms have resolved dramatically.  She no longer has numbness or weakness when standing.  She has some lower back lumbosacral region muscular pain when she is mobilizing.  Bowel and bladder function have remained normal.  At this point in time she is taking only Tylenol for pain management.\par \par There is no new imaging to review today.\par \par On examination the patient's incision has healed extremely well.  Surgical staples were removed without difficulty.  There is no redness swelling fluctuance or evidence of discharge.  The patient is ambulating well without any assistive device.  The patient does have some limitations of range of motion secondary to paraspinal muscular pain at the level of surgery.  With respect to the patient's upper left thigh discomfort there is tenderness to palpation in a distribution typical of the support pad on the operating table during surgery.  I have reassured the patient again that this will be self limited and resolve over the next several weeks.  Otherwise the patient has good strength of knee extension, plantar flexion and dorsi flexion.\par \par I am very pleased with the patient's early recovery from this extensive surgery.  I have reassured the patient that the degree of muscular discomfort that she has is very typical at this early time after surgery.  I have recommended that we will initiate physical therapy in 2weeks to assist with range of motion and general conditioning and possibly use of massage to limit scar formation.  A prescription for physical therapy has been provided.  I will see the patient again in 4 weeks and at that time we will get a CT scan of the lumbar spine to evaluate the degree of decompression  And stability.\par \par Thank you for very kindly for including me in the evaluation and treatment of your patient.  Please do not hesitate to contact me should you have any concerns or questions regarding this evaluation, the patient's recent lumbar surgery, or her ongoing follow-up care plan. [FreeTextEntry3] : Tyron Varma MD, PhD, FRCPSC                            Attending Neurosurgeon   of Neurosurgery  [facility]  [address], [address]nd [place] [address]  Office: [phone]  Fax: [phone]

## 2022-01-13 ENCOUNTER — APPOINTMENT (OUTPATIENT)
Dept: FAMILY MEDICINE | Facility: CLINIC | Age: 82
End: 2022-01-13

## 2022-01-14 NOTE — HISTORY OF PRESENT ILLNESS
[Coronary Artery Disease] : coronary artery disease [COPD] : COPD [No Adverse Anesthesia Reaction] : no adverse anesthesia reaction in self or family member [(Patient denies any chest pain, claudication, dyspnea on exertion, orthopnea, palpitations or syncope)] : Patient denies any chest pain, claudication, dyspnea on exertion, orthopnea, palpitations or syncope [Moderate (4-6 METs)] : Moderate (4-6 METs) [Chronic Anticoagulation] : no chronic anticoagulation [Chronic Kidney Disease] : no chronic kidney disease [Diabetes] : no diabetes [FreeTextEntry1] :  transanal excision of rectal mass/polyp [FreeTextEntry2] : 5/17/21 [FreeTextEntry3] : Dr. Jensen [FreeTextEntry4] : Pt in office for medical clearance. Pt to go for procedure for rectal mass/polyp. Pt denies chest pain, palpitations, dyspnea, fever, chills, nausea, or vomiting. Pt has hx of CAD s/p CABG x 3 vessels, pt sees cardio Dr. Salgado. Pt has hx of COPD, follows with pulm, not currently on inhaler regimen. Denies CP, palpitations, dyspnea, n/v\par \par Pt has persistent low back pain, pt to see neurosurgeon Dr. De La Paz. Pt has worsening back pain nonradiating, worse with standing or walking for long periods. [FreeTextEntry1] : Dev appt needed  for  cardiac  infant   -  has appt  in Jan 2022\par Neuro ppt 1/14/22 \par Has /Need Cardiology & Cardio Thoracic surgery appt ( Mom aware) [FreeTextEntry2] : OT/PT in today  and given instructions on exercises at home [FreeTextEntry3] : YES. PTx2 [FreeTextEntry4] : Arley 27 kriss( written instructions will mail to mom [FreeTextEntry5] : Lasix  [FreeTextEntry6] : n/a [FreeTextEntry7] : n/a [FreeTextEntry8] : ANANDA  [FreeTextEntry9] : Synagis  being  given  by PMD  [de-identified] :  Aquaphor for   dry skin [de-identified] : no [de-identified] : no

## 2022-01-17 ENCOUNTER — APPOINTMENT (OUTPATIENT)
Dept: CT IMAGING | Facility: CLINIC | Age: 82
End: 2022-01-17

## 2022-01-20 ENCOUNTER — NON-APPOINTMENT (OUTPATIENT)
Age: 82
End: 2022-01-20

## 2022-01-27 ENCOUNTER — RX RENEWAL (OUTPATIENT)
Age: 82
End: 2022-01-27

## 2022-02-02 ENCOUNTER — APPOINTMENT (OUTPATIENT)
Dept: CT IMAGING | Facility: CLINIC | Age: 82
End: 2022-02-02
Payer: MEDICARE

## 2022-02-02 ENCOUNTER — OUTPATIENT (OUTPATIENT)
Dept: OUTPATIENT SERVICES | Facility: HOSPITAL | Age: 82
LOS: 1 days | End: 2022-02-02
Payer: MEDICARE

## 2022-02-02 ENCOUNTER — APPOINTMENT (OUTPATIENT)
Dept: NEUROSURGERY | Facility: CLINIC | Age: 82
End: 2022-02-02
Payer: MEDICARE

## 2022-02-02 DIAGNOSIS — Z98.49 CATARACT EXTRACTION STATUS, UNSPECIFIED EYE: Chronic | ICD-10-CM

## 2022-02-02 DIAGNOSIS — M48.062 SPINAL STENOSIS, LUMBAR REGION WITH NEUROGENIC CLAUDICATION: ICD-10-CM

## 2022-02-02 DIAGNOSIS — Z95.1 PRESENCE OF AORTOCORONARY BYPASS GRAFT: Chronic | ICD-10-CM

## 2022-02-02 DIAGNOSIS — K62.89 OTHER SPECIFIED DISEASES OF ANUS AND RECTUM: Chronic | ICD-10-CM

## 2022-02-02 DIAGNOSIS — M47.816 SPONDYLOSIS WITHOUT MYELOPATHY OR RADICULOPATHY, LUMBAR REGION: ICD-10-CM

## 2022-02-02 DIAGNOSIS — M54.50 LOW BACK PAIN, UNSPECIFIED: ICD-10-CM

## 2022-02-02 PROCEDURE — 99024 POSTOP FOLLOW-UP VISIT: CPT

## 2022-02-02 PROCEDURE — 72131 CT LUMBAR SPINE W/O DYE: CPT | Mod: 26

## 2022-02-02 PROCEDURE — 72131 CT LUMBAR SPINE W/O DYE: CPT

## 2022-02-02 RX ORDER — OXYCODONE 5 MG/1
5 TABLET ORAL EVERY 6 HOURS
Qty: 56 | Refills: 0 | Status: DISCONTINUED | COMMUNITY
Start: 2021-12-30 | End: 2022-02-02

## 2022-02-22 ENCOUNTER — RX RENEWAL (OUTPATIENT)
Age: 82
End: 2022-02-22

## 2022-03-21 ENCOUNTER — APPOINTMENT (OUTPATIENT)
Dept: NEUROSURGERY | Facility: CLINIC | Age: 82
End: 2022-03-21

## 2022-03-29 ENCOUNTER — APPOINTMENT (OUTPATIENT)
Dept: FAMILY MEDICINE | Facility: CLINIC | Age: 82
End: 2022-03-29

## 2022-04-04 ENCOUNTER — APPOINTMENT (OUTPATIENT)
Dept: FAMILY MEDICINE | Facility: CLINIC | Age: 82
End: 2022-04-04
Payer: MEDICARE

## 2022-04-04 VITALS
WEIGHT: 168 LBS | OXYGEN SATURATION: 97 % | SYSTOLIC BLOOD PRESSURE: 120 MMHG | TEMPERATURE: 97.2 F | HEART RATE: 83 BPM | DIASTOLIC BLOOD PRESSURE: 70 MMHG | HEIGHT: 64 IN | BODY MASS INDEX: 28.68 KG/M2

## 2022-04-04 PROCEDURE — 99214 OFFICE O/P EST MOD 30 MIN: CPT

## 2022-04-04 NOTE — ASSESSMENT
[FreeTextEntry1] : frequent stools - trial of dicyclomine. avoid exacerbatory foods\par cardiomyopathy - stable f/u cardio\par anxiety, insomnia - refill xanax prn.  checked

## 2022-04-04 NOTE — HISTORY OF PRESENT ILLNESS
[de-identified] : Pt f/u anxiety, insomnia, COPD, cardiomyopathy. Pt on xanax prn. Declines SSRI at this time. COPD\par Pt c/o frequent loose stools for past year. Pt going often 4-5x a day. Pt c/o inc bloating and gas. Denies bloody or black stools, abd pain, unintentional weight loss.\par

## 2022-04-30 ENCOUNTER — TRANSCRIPTION ENCOUNTER (OUTPATIENT)
Age: 82
End: 2022-04-30

## 2022-05-24 ENCOUNTER — APPOINTMENT (OUTPATIENT)
Dept: FAMILY MEDICINE | Facility: CLINIC | Age: 82
End: 2022-05-24
Payer: MEDICARE

## 2022-05-24 VITALS
SYSTOLIC BLOOD PRESSURE: 146 MMHG | DIASTOLIC BLOOD PRESSURE: 84 MMHG | TEMPERATURE: 97 F | WEIGHT: 168 LBS | BODY MASS INDEX: 28.68 KG/M2 | OXYGEN SATURATION: 93 % | HEART RATE: 95 BPM | HEIGHT: 64 IN

## 2022-05-24 PROCEDURE — G0439: CPT

## 2022-05-25 NOTE — HISTORY OF PRESENT ILLNESS
[de-identified] : Pt in office for AWV. \par \par Pt c/o chronic back pain for years. Pt was seeing spine pain mgmt is s/p multiple epidurals which failed and then went to neurosx Dr. Varma s/p laminectomy in 12/2021 with minimal improvement in pain. Pt still has low back pain and unstable gait. Pt would like to start PT\par \par Pt c/o weight gain in past few years. Pt trying to lose weight by decreasing alcohol intake and food portions but has still gained 7 lbs in past year. Pt would like to discuss trying a medication to help w/ weight loss. Pt has been feeling well. Denies CP, palpitations, dyspnea, n/v.\par \par Pt has COPD, sees pulm Dr Mullins. Pt has hx of pulm nodule which had grown on previous CT chest. Pt advised to go for PET scan by pulmonologist which pt has not yet done because she felt it was not necessary. Denies dyspnea except with significant exertion. Otherwise denies CP, palpitations, dyspnea, wheeze, n/v.\par \par Pt has CAD s/p 3 vessel CABG, sees cardio Dr. Salgado regularly. \par \par Pt has chronic anxiety and insomnia. Pt uses xanax prn which has helped control symptoms.\par \par Exsmoker\par ETOH use social 1-2 glass wine every 3 days\par Drug use denies\par Exercises irregularly\par Diet balanced, high portions\par

## 2022-05-25 NOTE — HEALTH RISK ASSESSMENT
[Yes] : Yes [1 or 2 (0 pts)] : 1 or 2 (0 points) [Never (0 pts)] : Never (0 points) [No] : In the past 12 months have you used drugs other than those required for medical reasons? No [No falls in past year] : Patient reported no falls in the past year [Patient declined mammogram] : Patient declined mammogram [Patient declined PAP Smear] : Patient declined PAP Smear [Patient reported bone density results were normal] : Patient reported bone density results were normal [Patient declined colonoscopy] : Patient declined colonoscopy [HIV test declined] : HIV test declined [Hepatitis C test declined] : Hepatitis C test declined [None] : None [With Family] : lives with family [# Of Children ___] : has [unfilled] children [Fully functional (using the telephone, shopping, preparing meals, housekeeping, doing laundry, using] : Fully functional and needs no help or supervision to perform IADLs (using the telephone, shopping, preparing meals, housekeeping, doing laundry, using transportation, managing medications and managing finances) [Reports normal functional visual acuity (ie: able to read med bottle)] : Reports normal functional visual acuity [Smoke Detector] : smoke detector [Carbon Monoxide Detector] : carbon monoxide detector [Seat Belt] :  uses seat belt [Former] : Former [20 or more] : 20 or more [1] : 1) Little interest or pleasure doing things for several days (1) [PHQ-2 Positive] : PHQ-2 Positive [Several Days (1)] : 2.) Feeling down, depressed or hopeless? Several days [1/2 of Days or More (2)] : 4.) Feeling tired or having little energy? Half the days or more [Not at All (0)] : 8.) Moving or speaking so slowly that other people could have noticed, or the opposite, moving or speaking faster than usual? Not at all [Mild] : severity of depression is mild [Not at all] : How difficult have these problems made it for you to do your work, take care of things at home, or get along with people? Not at all [PHQ-9 Negative - No further assessment needed] : PHQ-9 Negative - No further assessment needed [] :  [Feels Safe at Home] : Feels safe at home [With Patient/Caregiver] : , with patient/caregiver [Designated Healthcare Proxy] : Designated healthcare proxy [Name: ___] : Health Care Proxy's Name: [unfilled]  [Relationship: ___] : Relationship: [unfilled] [DNR] : DNR [DNI] : DNI [Fair] : ~his/her~ current health as fair  [Good] : ~his/her~  mood as  good [2 - 4 times a month (2 pts)] : 2-4 times a month (2 points) [de-identified] : quit 2007, 1ppd x 30 yrs [YearQuit] : 2007 [Audit-CScore] : 2 [JNT1Bjsup] : 2 [KZC0TmpipFotls] : 4 [Change in mental status noted] : No change in mental status noted [Language] : denies difficulty with language [Behavior] : denies difficulty with behavior [Learning/Retaining New Information] : denies difficulty learning/retaining new information [Handling Complex Tasks] : denies difficulty handling complex tasks [Reasoning] : denies difficulty with reasoning [Spatial Ability and Orientation] : denies difficulty with spatial ability and orientation [Independent] : managing finances [Some assistance needed] : using transportation [Full assistance needed] : housekeeping [Reports changes in hearing] : Reports no changes in hearing [Reports changes in vision] : Reports no changes in vision [Reports changes in dental health] : Reports no changes in dental health [BoneDensityDate] : 10/18 [de-identified] : lives w/ 2 sons [FreeTextEntry8] : uses cane to walk, unstable gait [FreeTextEntry4] : pt unable to stand for extended periods to cook [AdvancecareDate] : 05/22

## 2022-05-25 NOTE — ASSESSMENT
[FreeTextEntry1] : pulm nodule, COPD - f/u with pulm. repeat CT chest\par CAD, HTN- cont meds. f/u cardio. \par anxiety, insomnia - xanax prn. if anxiety worsens start ssri\par low back pain - refer to physiatrist.. advised wt loss. start PT. diclofenac prn, advised sparing use. Possible adverse effects discussed with pt\par CKD 3a - recheck renal func\par Healthy diet and regular exercise regimen discussed w/ pt.\par Screening labs ordered\par Any questions call office

## 2022-05-31 LAB
ALBUMIN SERPL ELPH-MCNC: 4.5 G/DL
ALP BLD-CCNC: 118 U/L
ALT SERPL-CCNC: 21 U/L
ANION GAP SERPL CALC-SCNC: 14 MMOL/L
AST SERPL-CCNC: 22 U/L
BASOPHILS # BLD AUTO: 0.08 K/UL
BASOPHILS NFR BLD AUTO: 0.9 %
BILIRUB SERPL-MCNC: 0.3 MG/DL
BUN SERPL-MCNC: 19 MG/DL
CALCIUM SERPL-MCNC: 10.6 MG/DL
CHLORIDE SERPL-SCNC: 103 MMOL/L
CHOLEST SERPL-MCNC: 198 MG/DL
CO2 SERPL-SCNC: 25 MMOL/L
CREAT SERPL-MCNC: 0.97 MG/DL
EGFR: 59 ML/MIN/1.73M2
EOSINOPHIL # BLD AUTO: 0.31 K/UL
EOSINOPHIL NFR BLD AUTO: 3.4 %
ESTIMATED AVERAGE GLUCOSE: 126 MG/DL
GLUCOSE SERPL-MCNC: 99 MG/DL
HBA1C MFR BLD HPLC: 6 %
HCT VFR BLD CALC: 46.7 %
HDLC SERPL-MCNC: 48 MG/DL
HGB BLD-MCNC: 14.3 G/DL
IMM GRANULOCYTES NFR BLD AUTO: 0.6 %
LDLC SERPL CALC-MCNC: 99 MG/DL
LYMPHOCYTES # BLD AUTO: 3.32 K/UL
LYMPHOCYTES NFR BLD AUTO: 36.7 %
MAN DIFF?: NORMAL
MCHC RBC-ENTMCNC: 25.8 PG
MCHC RBC-ENTMCNC: 30.6 GM/DL
MCV RBC AUTO: 84.1 FL
MONOCYTES # BLD AUTO: 0.72 K/UL
MONOCYTES NFR BLD AUTO: 8 %
NEUTROPHILS # BLD AUTO: 4.57 K/UL
NEUTROPHILS NFR BLD AUTO: 50.4 %
NONHDLC SERPL-MCNC: 150 MG/DL
PLATELET # BLD AUTO: 282 K/UL
POTASSIUM SERPL-SCNC: 5.2 MMOL/L
PROT SERPL-MCNC: 7.5 G/DL
RBC # BLD: 5.55 M/UL
RBC # FLD: 14.6 %
SODIUM SERPL-SCNC: 142 MMOL/L
T4 FREE SERPL-MCNC: 1.2 NG/DL
TRIGL SERPL-MCNC: 255 MG/DL
TSH SERPL-ACNC: 1.99 UIU/ML
WBC # FLD AUTO: 9.05 K/UL

## 2022-06-09 ENCOUNTER — APPOINTMENT (OUTPATIENT)
Dept: PHYSICAL MEDICINE AND REHAB | Facility: CLINIC | Age: 82
End: 2022-06-09

## 2022-07-12 ENCOUNTER — APPOINTMENT (OUTPATIENT)
Dept: FAMILY MEDICINE | Facility: CLINIC | Age: 82
End: 2022-07-12

## 2022-07-12 PROCEDURE — 99442: CPT

## 2022-09-13 ENCOUNTER — RX RENEWAL (OUTPATIENT)
Age: 82
End: 2022-09-13

## 2022-09-15 ENCOUNTER — APPOINTMENT (OUTPATIENT)
Dept: FAMILY MEDICINE | Facility: CLINIC | Age: 82
End: 2022-09-15

## 2022-09-15 VITALS
BODY MASS INDEX: 29.02 KG/M2 | DIASTOLIC BLOOD PRESSURE: 82 MMHG | OXYGEN SATURATION: 94 % | HEIGHT: 64 IN | TEMPERATURE: 97.9 F | SYSTOLIC BLOOD PRESSURE: 136 MMHG | WEIGHT: 170 LBS | HEART RATE: 117 BPM

## 2022-09-15 PROCEDURE — 99214 OFFICE O/P EST MOD 30 MIN: CPT

## 2022-09-15 RX ORDER — DICLOFENAC SODIUM 50 MG/1
50 TABLET, DELAYED RELEASE ORAL
Qty: 60 | Refills: 0 | Status: COMPLETED | COMMUNITY
Start: 2022-05-24 | End: 2022-09-15

## 2022-09-15 NOTE — HISTORY OF PRESENT ILLNESS
[FreeTextEntry8] : Pt c/o R low back Pt is s/p L3/L4 laminectomy in 12/21/21. Pt ahs been increasing exercise in past month. Pt took tylenol and heating pad which helped w/ pain. Pain worse w/ standing or walking, improved w/ sitting or laying down. \par Pt has done RF ablation and epidural injections in the past w/ no improvement.w/ Dr. Gamble.\par Pt has done PT course which did not help symptoms. Pain has worsened and now has to use cane.\par \par Pt has chronic anxiety and insomnia. Pt uses xanax prn which has helped

## 2022-09-15 NOTE — ASSESSMENT
[FreeTextEntry1] : lumbar back pain s/p laminectomy - repeat CT lumbarr spine, f/u neurosx. flexeril prn spasm, celebrex prn\par anxiety, insomnia - refill xanx prn.  checked

## 2022-09-15 NOTE — PHYSICAL EXAM
[Normal] : normal rate, regular rhythm, normal S1 and S2 and no murmur heard [de-identified] : lumbar back pain w/ radicular pain into buttocks

## 2022-09-30 ENCOUNTER — APPOINTMENT (OUTPATIENT)
Dept: CT IMAGING | Facility: CLINIC | Age: 82
End: 2022-09-30

## 2022-09-30 ENCOUNTER — OUTPATIENT (OUTPATIENT)
Dept: OUTPATIENT SERVICES | Facility: HOSPITAL | Age: 82
LOS: 1 days | End: 2022-09-30
Payer: MEDICARE

## 2022-09-30 DIAGNOSIS — M48.062 SPINAL STENOSIS, LUMBAR REGION WITH NEUROGENIC CLAUDICATION: ICD-10-CM

## 2022-09-30 DIAGNOSIS — Z98.49 CATARACT EXTRACTION STATUS, UNSPECIFIED EYE: Chronic | ICD-10-CM

## 2022-09-30 DIAGNOSIS — Z95.1 PRESENCE OF AORTOCORONARY BYPASS GRAFT: Chronic | ICD-10-CM

## 2022-09-30 DIAGNOSIS — M54.50 LOW BACK PAIN, UNSPECIFIED: ICD-10-CM

## 2022-09-30 DIAGNOSIS — K62.89 OTHER SPECIFIED DISEASES OF ANUS AND RECTUM: Chronic | ICD-10-CM

## 2022-09-30 PROCEDURE — 72131 CT LUMBAR SPINE W/O DYE: CPT | Mod: 26

## 2022-09-30 PROCEDURE — 72131 CT LUMBAR SPINE W/O DYE: CPT

## 2022-11-11 ENCOUNTER — NON-APPOINTMENT (OUTPATIENT)
Age: 82
End: 2022-11-11

## 2022-11-19 ENCOUNTER — OUTPATIENT (OUTPATIENT)
Dept: OUTPATIENT SERVICES | Facility: HOSPITAL | Age: 82
LOS: 1 days | End: 2022-11-19
Payer: MEDICARE

## 2022-11-19 ENCOUNTER — APPOINTMENT (OUTPATIENT)
Dept: MRI IMAGING | Facility: CLINIC | Age: 82
End: 2022-11-19

## 2022-11-19 DIAGNOSIS — Z98.890 OTHER SPECIFIED POSTPROCEDURAL STATES: ICD-10-CM

## 2022-11-19 DIAGNOSIS — Z98.49 CATARACT EXTRACTION STATUS, UNSPECIFIED EYE: Chronic | ICD-10-CM

## 2022-11-19 DIAGNOSIS — M47.816 SPONDYLOSIS WITHOUT MYELOPATHY OR RADICULOPATHY, LUMBAR REGION: ICD-10-CM

## 2022-11-19 DIAGNOSIS — K62.89 OTHER SPECIFIED DISEASES OF ANUS AND RECTUM: Chronic | ICD-10-CM

## 2022-11-19 DIAGNOSIS — M48.062 SPINAL STENOSIS, LUMBAR REGION WITH NEUROGENIC CLAUDICATION: ICD-10-CM

## 2022-11-19 DIAGNOSIS — Z95.1 PRESENCE OF AORTOCORONARY BYPASS GRAFT: Chronic | ICD-10-CM

## 2022-11-19 PROCEDURE — 72148 MRI LUMBAR SPINE W/O DYE: CPT

## 2022-11-19 PROCEDURE — 72148 MRI LUMBAR SPINE W/O DYE: CPT | Mod: 26

## 2022-11-21 ENCOUNTER — APPOINTMENT (OUTPATIENT)
Dept: PAIN MANAGEMENT | Facility: CLINIC | Age: 82
End: 2022-11-21

## 2022-11-21 VITALS — BODY MASS INDEX: 29.02 KG/M2 | HEIGHT: 64 IN | WEIGHT: 170 LBS

## 2022-11-21 PROCEDURE — 99215 OFFICE O/P EST HI 40 MIN: CPT

## 2022-11-22 RX ORDER — METHYLPREDNISOLONE 4 MG/1
4 TABLET ORAL
Qty: 1 | Refills: 1 | Status: DISCONTINUED | COMMUNITY
Start: 2022-01-21 | End: 2022-11-22

## 2022-11-22 NOTE — PHYSICAL EXAM
[de-identified] : Constitutional:\par - No acute distress\par - Well developed; well nourished\par \par Neurological:\par - normal mood and affect\par - alert and oriented x 3\par \par Cardiovascular:\par - grossly normal\par \par Lumbar Spine Exam:\par \par Inspection:\par erythema (-)\par ecchymosis (-)\par rashes (-)\par alignment: no scoliosis\par Well-healed surgical scar\par \par Palpation:\par Midline lumbar tenderness:             (-)\par midline thoracic tenderness:          (-)\par Lumbar paraspinal tenderness:      L (-) ; R (-)\par thoracic paraspinal tenderness:    L (-) ; R (-)\par sciatic nerve tenderness :             L (-) ; R (-)\par SI joint tenderness:                        L (-) ; R (-)\par GTB tenderness:                            L (-);  R (-)\par \par ROM:  \par ROM  -full with stiffness and extension\par Pain with extremes of extension\par \par Strength:\par                                    Right       Left   \par Hip Flexion:                (5/5)       (5/5)\par Quadriceps:               (5/5)       (5/5)\par Hamstrings:                (5/5)       (5/5)\par Ankle Dorsiflexion:     (5/5)       (5/5)\par EHL:                           (5/5)       (5/5)\par Ankle Plantarflexion:  (5/5)       (5/5)\par \par Special Tests:\par SLR:                           R (EQ) ; L (EQ)\par Facet loading:            R (+) ; L (+)\par STEWART test:               R (-) ; L (-)\par Hamstring tightness:  R (-);  L (-)\par \par Neurologic:\par SI LT throughout right lower extremity\par SI LT throughout left lower extremity\par \par Reflexes normal and symmetric bilateral lower extremities\par \par Gait:\par non- antalgic gait\par ambulates without assistive device

## 2022-11-22 NOTE — HISTORY OF PRESENT ILLNESS
[Lower back] : lower back [Sudden] : sudden [9] : 9 [0] : 0 [Dull/Aching] : dull/aching [Localized] : localized [Radiating] : radiating [Intermittent] : intermittent [Household chores] : household chores [Leisure] : leisure [Social interactions] : social interactions [Meds] : meds [Sitting] : sitting [Walking] : walking [Bending forward] : bending forward [Extending back] : extending back [Exercising] : exercising [Stairs] : stairs [FreeTextEntry1] : 11/21/22 -  Patient presents for reevaluation visit.  She was last seen nearly 2 years ago.  Since her last visit she has undergone L3-L4 lumbar laminectomy with Dr. Varma.  This was on 12/21/2021.  She does not report any appreciable change following surgical intervention.  Patient with ongoing low back pain with radiation to the bilateral buttocks.  There is associated sensation of "heaviness" of the legs.  Significantly worse with standing and walking and improved while seated.  She denies any lower extremity weakness, bladder/bowel dysfunction, or saddle numbness.\par \par 12/14/2020  FUV regarding axial low back pain. Pain radiates up to midback. No pain in buttocks/legs. Has obtained\par prior pain records for review today, feels she had at least 4 lumbar SERA's in the last year. Currently in chiropractic,\par inquires regarding traction therapy. \par \par 11/30/2020 - Patient presents for initial evaluation. She c/o low back pain for 1-2 years. Predominately axial low back pain. No radiating pain into buttocks/legs. Pain is worse with increased activity/household chores, alleviated upon sitting. Feels her walking tolerance is reduced due to pain. Has had physical therapy previously, felt she obtained better response with chiropractic. Previous pain management with Dr. Pierre, had about 5 lumbar SERA's in the last year with equivocal response. Uses Tylenol PRN. \par \par Injections:\par 1) Bilateral L4-L5, L5-S1 MBB (5/26/20, 10/8/20) - Dr. Pierre\par 2) L1-L2 LESI (8/11/2020) - Dr. Pierre\par \par Previous Spine Surgery:\par 1) L3-L4 laminectomy (12/21/21) - Dr. Varma\par \par Imaging:\par MRI Lumbar Spine (11/19/22) - [facility]\par \par L1-L2: There is a small right foraminal disc extrusion extending slightly above the L1-L2 level. This results in mild to moderate right neural foraminal narrowing. The size of the disc extrusion has decreased slightly since 2/18/2020. There is mild bilateral facet arthropathy.\par L2-L3: Moderate bilateral facet arthropathy.\par L3-L4: Posterior laminectomy. Moderate bilateral facet arthropathy. Minimal bilateral foraminal narrowing.\par L4-L5: Central laminectomy with severe right and moderate left facet arthropathy with ligamentum flavum thickening. There is mass effect upon the spinal canal with indistinction of the nerve roots. There is minimal right foraminal narrowing. There is no left neural foraminal narrowing.\par L5-S1: Moderate to severe right facet arthropathy and moderate left facet arthropathy. There is a minimal right foraminal disc protrusion with mild right foraminal narrowing, unchanged.\par \par Physician Disclaimer: I have personally reviewed and confirmed all HPI data with the patient. [] : no [FreeTextEntry7] : RIGHT KNEE [de-identified] : lumbar mri at [facility]

## 2022-11-29 ENCOUNTER — APPOINTMENT (OUTPATIENT)
Dept: NEUROSURGERY | Facility: CLINIC | Age: 82
End: 2022-11-29
Payer: MEDICARE

## 2022-11-29 VITALS
WEIGHT: 170 LBS | HEART RATE: 84 BPM | DIASTOLIC BLOOD PRESSURE: 81 MMHG | SYSTOLIC BLOOD PRESSURE: 186 MMHG | TEMPERATURE: 97.1 F | HEIGHT: 64 IN | BODY MASS INDEX: 29.02 KG/M2 | OXYGEN SATURATION: 94 %

## 2022-11-29 DIAGNOSIS — M54.50 LOW BACK PAIN, UNSPECIFIED: ICD-10-CM

## 2022-11-29 DIAGNOSIS — G89.29 LOW BACK PAIN, UNSPECIFIED: ICD-10-CM

## 2022-11-29 PROCEDURE — 99214 OFFICE O/P EST MOD 30 MIN: CPT

## 2022-11-29 NOTE — CONSULT LETTER
[Dear  ___] : Dear  [unfilled], [Courtesy Letter:] : I had the pleasure of seeing your patient, [unfilled], in my office today. [Sincerely,] : Sincerely, [FreeTextEntry2] : John Reyes,MD\par [address]\[place] [address]  [FreeTextEntry1] : This very pleasant 81-year-old woman is seen for routine postsurgical follow-up.  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  The patient had a combination of neurogenic claudication as well as radiculopathy.  The patient is now 6 weeks after undergoing an L3 and L4 total laminectomy procedure.  The patient indicates significant improvement in her symptom profile.  At this point in time she denies any numbness or tingling radiating in both legs and feet.  She also has had resolution of the sciatica type symptoms.  She does still have some right knee sharp pain which is related to arthritic degeneration.  There is some dull aching lower back pain that she experiences with activity.  She denies any sharp or stabbing pain.  Bowel and bladder function have been normal.  The patient continues to take muscle relaxers.\par \par On examination the patient is in some mild distress with transitions of posture.  The discomfort is mechanical and muscular in nature.  There is no sharp or stabbing pain with flexion or extension.  The patient has good strength of plantar flexion and dorsiflexion.  There is some tenderness to the right knee but otherwise strength of knee extension is good bilaterally.  The incision is healing very well with no signs of redness swelling or discharge.\par \par I have reviewed with the patient her postoperative CT scan which identifies good central and lateral recess decompression especially across the L3-4 junction.  There is no evidence of new instability at the previously noted grade 1 spondylolisthesis at L3-4 and L4-5.\par \par Overall I have reassured the patient that her current symptoms of dull lower back pain which is muscular in nature is to be expected at this early interval after surgery.  I am very pleased with the patient's early resolution of her neurogenic claudication and sciatic symptoms.  At this point she is appropriate to initiate physical therapy to assess and treat muscular symptoms and to improve her overall conditioning and range of motion abilities.  I will see the patient again in another 6 to 8 weeks to review her response to treatment and ongoing recovery from surgery.\par \par Thank you for very kindly including me in the evaluation and treatment of your patient.  Please do not hesitate to contact me should you have any questions or concerns regarding the patient's recent lumbar laminectomy surgery, or her ongoing follow-up care and recovery progress. [FreeTextEntry3] : Tyron Varma MD, PhD, FRCPSC \par Attending Neurosurgeon \par  of Neurosurgery \par [facility] \par [address] \[place] [address] \par Office: [phone] \par Fax: [phone]\par \par

## 2022-12-08 ENCOUNTER — APPOINTMENT (OUTPATIENT)
Dept: ORTHOPEDIC SURGERY | Facility: CLINIC | Age: 82
End: 2022-12-08

## 2022-12-08 PROCEDURE — 99214 OFFICE O/P EST MOD 30 MIN: CPT | Mod: 25

## 2022-12-08 PROCEDURE — 73564 X-RAY EXAM KNEE 4 OR MORE: CPT | Mod: RT

## 2022-12-08 PROCEDURE — 20610 DRAIN/INJ JOINT/BURSA W/O US: CPT | Mod: RT

## 2022-12-08 NOTE — HISTORY OF PRESENT ILLNESS
[de-identified] : Patient is an 82-year-old female here today for follow-up of her right knee pain.  She states that she has had an minimal relief of the pain since the gel injection she received last year.  She had been to directed physical therapy for many months and only hurt her more.  She is using Voltaren gel as well as Tylenol not having any relief of the pain in her knee.  She is needing to use a cane to ambulate due to severe pain.  Hurts a lot downstairs as well as arising reseated position.  Complains of buckling in the knee.  Underwent a spine surgery and is still having pain in her back.  Is here today for follow-up.

## 2022-12-08 NOTE — DISCUSSION/SUMMARY
[Medication Risks Reviewed] : Medication risks reviewed [Surgical risks reviewed] : Surgical risks reviewed [de-identified] : Patient is an 82-year-old female with severe right knee osteoarthritis most pronounced in the medial compartment presenting today for follow-up.  She has tried extensive conservative treatment including directed physical therapy Voltaren gel Tylenol activity modifications feeling of relief of the pain in her knee.  She has not yet tried a cortisone injection and would like to try that prior to any surgical intervention.  I do think that is indicated.  I gave her injection of cortisone which she tolerated well.  I recommend she continue take Tylenol and use her Voltaren gel.  She should continue to ambulate with use of a cane as it provides her with more balance.  I will see her back in 6 weeks for repeat evaluation.  If no improvement of symptoms we will proceed with scheduling a total knee arthroplasty at that time.  All questions were asked and answered

## 2022-12-08 NOTE — PHYSICAL EXAM
[de-identified] : Musculoskeletal:. Right knee exam shows no effusion, ROM is 5-1 30 degrees, no instability, no pain with Gio, medial joint line tenderness. \par 5/5 motor strength in bilateral lower extremities. Sensory: Intact in bilateral lower extremities. DTRs: Biceps, brachioradialis, triceps, patellar, ankle and plantar 2+ and symmetric bilaterally. Pulses: dorsalis pedis, posterior tibial, femoral, popliteal, and radial 2+ and symmetric bilaterally. \par Constitutional: Alert and in no acute distress, but well-appearing. \par  [de-identified] : 4 views of the right knee obtained the office today show no acute fracture or dislocation.  There is severe medial joint space narrowing bone-on-bone osteoarthritis patellofemoral arthritis consistent with Kellgren-Wilfrid grade 3 4 changes

## 2022-12-22 ENCOUNTER — APPOINTMENT (OUTPATIENT)
Dept: PAIN MANAGEMENT | Facility: CLINIC | Age: 82
End: 2022-12-22

## 2022-12-22 VITALS — HEIGHT: 64 IN | BODY MASS INDEX: 29.02 KG/M2 | WEIGHT: 170 LBS

## 2022-12-22 PROCEDURE — 99214 OFFICE O/P EST MOD 30 MIN: CPT

## 2022-12-22 NOTE — ASSESSMENT
[FreeTextEntry1] : A thorough discussion occurred regarding available pain management treatment options including interventional,\par rehabilitative, pharmacological, and alternative modalities with the patient. We will proceed with the following:\par \par Interventional treatment options:\par - proceed with bilateral L4-L5 TFESI (80 mg Kenalog) with fluoroscopic guidance\par - patient with equivocal response to previous RFA with outside physician\par - Patient may be candidate for DCS trial with failure of further conservative care and unwilling to further pursue surgical intervention - not discussed\par - see additional instructions below\par \par Rehabilitative options:\par - patient declines trial of physical therapy at this time\par - Encourage participation in HEP as tolerated\par \par Medication based treatment options:\par - continue Tylenol 500-1000 mg TID PRN mild-moderate pain\par - Continue Celebrex 200 mg daily as needed for moderate pain\par - Continue cyclobenzaprine 5-10 mg TID PRN spasm\par - Add gabapentin 300 mg TID; titration schedule discussed\par - see additional instructions below\par \par Complementary treatment options:\par - Weight management and lifestyle modifications discussed\par - continue chiropractic care as needed\par \par Additional treatment recommendations as follows:\par - Follow-up with neurosurgery (Dr. Varma) as directed\par - Follow up 1-2 weeks post injection for assessment of efficacy and further recommendations\par \par The risks, benefits and alternatives of the proposed procedure were explained in detail with the patient. The risks outlined include but are not limited to infection, bleeding, post- dural puncture headache, nerve injury, a temporary increase in pain, failure to resolve symptoms, allergic reaction, and possible elevation of blood sugar in diabetics if using corticosteroid.  All questions were answered to patient's apparent satisfaction and he/she verbalized an understanding.\par \par I, James Govea acting as scribe, attest that this documentation has been prepared under the direction and in the presence of Provider Bertram Graves DO. \par \par The documentation recorded by the scribe, in my presence, accurately reflects the service I personally performed and the decisions made by me with my edits as appropriate.

## 2022-12-22 NOTE — PHYSICAL EXAM
[de-identified] : Constitutional:\par - No acute distress\par - Well developed; well nourished\par \par Neurological:\par - normal mood and affect\par - alert and oriented x 3\par \par Cardiovascular:\par - grossly normal\par \par Lumbar Spine Exam:\par \par Inspection:\par erythema (-)\par ecchymosis (-)\par rashes (-)\par alignment: no scoliosis\par Well-healed surgical scar\par \par Palpation:\par Midline lumbar tenderness:             (-)\par midline thoracic tenderness:          (-)\par Lumbar paraspinal tenderness:      L (-) ; R (-)\par thoracic paraspinal tenderness:    L (-) ; R (-)\par sciatic nerve tenderness :             L (-) ; R (-)\par SI joint tenderness:                        L (-) ; R (-)\par GTB tenderness:                            L (-);  R (-)\par \par ROM:  \par ROM  -full with stiffness and extension\par Pain with extremes of extension\par \par Strength:\par                                    Right       Left   \par Hip Flexion:                (5/5)       (5/5)\par Quadriceps:               (5/5)       (5/5)\par Hamstrings:                (5/5)       (5/5)\par Ankle Dorsiflexion:     (5/5)       (5/5)\par EHL:                           (5/5)       (5/5)\par Ankle Plantarflexion:  (5/5)       (5/5)\par \par Special Tests:\par SLR:                           R (EQ) ; L (EQ)\par Facet loading:            R (+) ; L (+)\par STEWART test:               R (-) ; L (-)\par Hamstring tightness:  R (-);  L (-)\par \par Neurologic:\par SI LT throughout right lower extremity\par SI LT throughout left lower extremity\par \par Reflexes normal and symmetric bilateral lower extremities\par \par Gait:\par Mildly antalgic gait\par ambulates without assistive device

## 2022-12-22 NOTE — HISTORY OF PRESENT ILLNESS
[Lower back] : lower back [Sudden] : sudden [9] : 9 [0] : 0 [Dull/Aching] : dull/aching [Localized] : localized [Radiating] : radiating [Intermittent] : intermittent [Household chores] : household chores [Leisure] : leisure [Social interactions] : social interactions [Meds] : meds [Sitting] : sitting [Walking] : walking [Bending forward] : bending forward [Extending back] : extending back [Exercising] : exercising [Stairs] : stairs [FreeTextEntry1] : 12/22/22 -  Patient presents for follow-up visit.  Patient reports pain in the lower back when standing and walking. Patient says pain no longer radiates to the lower extremities, but reports a feeling of heaviness in the legs. She also reports the laminectomy and injections did not relieve any pain.  \par \par 11/21/22 -  Patient presents for reevaluation visit.  She was last seen nearly 2 years ago.  Since her last visit she has undergone L3-L4 lumbar laminectomy with Dr. Varma.  This was on 12/21/2021.  She does not report any appreciable change following surgical intervention.  Patient with ongoing low back pain with radiation to the bilateral buttocks.  There is associated sensation of "heaviness" of the legs.  Significantly worse with standing and walking and improved while seated.  She denies any lower extremity weakness, bladder/bowel dysfunction, or saddle numbness.\par \par 12/14/2020  FUV regarding axial low back pain. Pain radiates up to midback. No pain in buttocks/legs. Has obtained\par prior pain records for review today, feels she had at least 4 lumbar SERA's in the last year. Currently in chiropractic,\par inquires regarding traction therapy. \par \par 11/30/2020 - Patient presents for initial evaluation. She c/o low back pain for 1-2 years. Predominately axial low back pain. No radiating pain into buttocks/legs. Pain is worse with increased activity/household chores, alleviated upon sitting. Feels her walking tolerance is reduced due to pain. Has had physical therapy previously, felt she obtained better response with chiropractic. Previous pain management with Dr. Pierre, had about 5 lumbar SERA's in the last year with equivocal response. Uses Tylenol PRN. \par \par Injections:\par 1) Facet joint RFA L4-5 L5-S1 (10/12/21) Dr. Pierre\par 2) L1-2 LESI (8/11/2020) Dr. Pierre\par 3) Bilateral L4-L5, L5-S1 MBB (5/26/20, 10/8/20) - Dr. Pierre\par \par Previous Spine Surgery:\par 1) L3-L4 laminectomy (12/21/21) - Dr. Varma\par \par Imaging:\par MRI Lumbar Spine (11/19/22) - [facility]\par \par L1-L2: There is a small right foraminal disc extrusion extending slightly above the L1-L2 level. This results in mild to moderate right neural foraminal narrowing. The size of the disc extrusion has decreased slightly since 2/18/2020. There is mild bilateral facet arthropathy.\par L2-L3: Moderate bilateral facet arthropathy.\par L3-L4: Posterior laminectomy. Moderate bilateral facet arthropathy. Minimal bilateral foraminal narrowing.\par L4-L5: Central laminectomy with severe right and moderate left facet arthropathy with ligamentum flavum thickening. There is mass effect upon the spinal canal with indistinction of the nerve roots. There is minimal right foraminal narrowing. There is no left neural foraminal narrowing.\par L5-S1: Moderate to severe right facet arthropathy and moderate left facet arthropathy. There is a minimal right foraminal disc protrusion with mild right foraminal narrowing, unchanged.\par \par Physician Disclaimer: I have personally reviewed and confirmed all HPI data with the patient. [] : no [FreeTextEntry7] : RIGHT KNEE [de-identified] : lumbar mri at [facility]

## 2023-01-08 PROBLEM — M54.50 CHRONIC LOW BACK PAIN: Status: ACTIVE | Noted: 2020-01-23

## 2023-01-08 NOTE — RESULTS/DATA
[FreeTextEntry1] :    MR Lumbar Spine No Cont             Final  No Documents Attached       EXAM: [id number] - MR SPINE LUMBAR  - ORDERED BY: JOSHUA ARMENDARIZ   PROCEDURE DATE:  11/19/2022    INTERPRETATION:  CLINICAL INFORMATION: L3-L4 laminectomy on 12/21/2021. Leg heaviness with walking. Low back muscle strain S39.012A.  ADDITIONAL CLINICAL INFORMATION: Post-laminectomy syndrome M96.1  TECHNIQUE: Multiplanar, multisequence MRI was performed of the lumbar spine. IV Contrast: NONE  PRIOR STUDIES: MRI of the lumbar spine from 2/18/2020 and CT scan of the lumbar spine from 9/30/2022.  FINDINGS:  LOCALIZER: Mild levoscoliosis of the lumbar spine. BONES: Status post L3 and L4 laminectomies. ALIGNMENT: Mild levoscoliosis of the lumbar spine. Trace grade 1 anterolisthesis of L3 on L4. SACROILIAC JOINTS/SACRUM: There is no sacral fracture. The SI joints are partially visualized but are intact. CONUS AND CAUDA EQUINA: The distal cord and conus are normal in signal. Conus terminates at L1.. VISUALIZED INTRAPELVIC/INTRA-ABDOMINAL SOFT TISSUES: 2.7 x 2.3 cm left adrenal nodule which is stable since 1/14/2019. Bilateral renal parapelvic cysts. PARASPINAL SOFT TISSUES: Mild edema within the posterior soft tissues in the postsurgical bed. Mild to moderate subcutaneous edema posteriorly.   INDIVIDUAL LEVELS:  LOWER THORACIC SPINE: At T10-T11 visualized only in the sagittal images there is a mild disc bulge asymmetric to the right with moderate right foraminal narrowing, unchanged. At T11-T12 there is a mild right paracentral disc protrusion, unchanged. At T12-L1 there is no spinal canal or neuroforaminal stenosis.  L1-L2: There is a small right foraminal disc extrusion extending slightly above the L1-L2 level. This results in mild to moderate right neural foraminal narrowing (5, 4). The size of the disc extrusion has decreased slightly since 2/18/2020. There is mild bilateral facet arthropathy. L2-L3: Moderate bilateral facet arthropathy. L3-L4: Posterior laminectomy. Moderate bilateral facet arthropathy. Minimal bilateral foraminal narrowing. L4-L5: Central laminectomy with severe right and moderate left facet arthropathy with ligamentum flavum thickening. There is mass effect upon the spinal canal with indistinction of the nerve roots. There is minimal right foraminal narrowing. There is no left neural foraminal narrowing. L5-S1: Moderate to severe right facet arthropathy and moderate left facet arthropathy. There is a minimal right foraminal disc protrusion with mild right foraminal narrowing, unchanged.  IMPRESSION: Status post laminectomies at L3 and L4.  Small right foraminal disc extrusion at L1-L2 resulting mild to moderate right neural foraminal narrowing. The disc extrusion has decreased slightly in size since 2/18/2020.  Mass effect remains upon the spinal canal at L4-5 with indistinction of the nerve roots.  --- End of Report ---       BILL MOSES MD; Attending Radiologist This document has been electronically signed. Nov 22 2022  9:29AM      Ordered by: JOSHUA ARMENDARIZ       Collected/Examined: 19Nov2022 02:44PM        Verification Required       Stage: Final         Performed at: [facility]       Resulted: 22Nov2022 08:57AM       Last Updated: 22Nov2022 09:33AM       Accession: [id number]

## 2023-01-08 NOTE — CONSULT LETTER
[Dear  ___] : Dear  [unfilled], [Courtesy Letter:] : I had the pleasure of seeing your patient, [unfilled], in my office today. [Sincerely,] : Sincerely, [FreeTextEntry2] : John Reyes,MD\par  [address] \[place] [address]  [FreeTextEntry1] : This very pleasant 81-year-old woman is seen for routine follow-up visit for chronic lower back pain.   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  The patient had a combination of neurogenic claudication as well as radiculopathy.  The patient one year ago underwent a L3 and L4 total laminectomy procedure.  The patient indicates improvement in her  radiculopathy and claudication symptom profile.  \par \par At this point in time, the patient is reporting an achy sensation over her lower back.  The pain worsens with activity.  There is no numbness or tingling radiating in both legs and feet.  She also has had resolution of the sciatica type symptoms.  The achy back pain is not worse in the morning.  She denies any sharp or stabbing pain.  Bowel and bladder function have been normal.  The patient continues to take muscle relaxers.  Heat therapy and physical therapy did cause an increase in sciatica like symptoms.  \par \par On examination the patient is in some mild distress with transitions of posture.  The discomfort is mechanical and muscular in nature.  There is no sharp or stabbing pain with flexion or extension.  The patient has good strength of plantar flexion and dorsiflexion.  There are decreased reflexes in the knee and ankle jerks.  No clonus and no \par weakness with full strength in both LE.  No temperature changes of the patients LE.   \par \par I have reviewed with the patient her most recent lumbar MRI form [facility] 11/19/2022 identifying the prior decompression at L3-4 junction.  There is progression at L4-L5 of severe stenosis and spondylolisthesis.   \par \par Overall I have reassured the patient that her current symptoms of dull lower back pain which is muscular in nature is to be expected at this early interval after surgery.  I am very pleased with the patient's early resolution of her neurogenic claudication and sciatic symptoms.  However, there is arthritic changes below the level of prior surgical decompression.  These changes show further progression from the last MRI image.  We discussed a lumbar fusion procedure which could be considered but this type of surgery is more invasive and extensive than her prior lumbar surgery.   Surgery would be the last option and the patient was encouraged to pursue nonsurgical measures prior to considering surgery.  At this point, it is an option to try acupuncture and consider the use of a TENS unit.  I have also suggested facet blocks by Dr Graves.  The patient was given instructions on how to use heat and cold therapy while at home.   I will see the patient again in another 6 to 8 weeks to review her response to treatment and ongoing recovery.   \par \par Thank you for very kindly including me in the evaluation and treatment of your patient.  Please do not hesitate to contact me should you have any questions or concerns regarding the patient's recent lumbar laminectomy surgery, or her ongoing follow-up care and recovery progress. [FreeTextEntry3] : Tyron Varma MD, PhD, FRCPSC                            Attending Neurosurgeon   of Neurosurgery  [facility]  [address], [address]nd [place] [address]  Office: [phone]  Fax: [phone]

## 2023-01-27 ENCOUNTER — APPOINTMENT (OUTPATIENT)
Dept: PAIN MANAGEMENT | Facility: CLINIC | Age: 83
End: 2023-01-27
Payer: MEDICARE

## 2023-01-27 PROCEDURE — 64483 NJX AA&/STRD TFRM EPI L/S 1: CPT | Mod: LT

## 2023-01-27 NOTE — PROCEDURE
[FreeTextEntry3] : Date of Service: 01/27/2023 \par \par Account: [id number]\par \par Patient: SIMEON CABRAL \par \par YOB: 1940\par \par Age: 82 year\par \par Surgeon:      Bertram Graves DO\par \par Assistant:    None\par \par Pre-Operative Diagnosis:         Lumbar radiculopathy                \par \par Post Operative Diagnosis:       Lumbar Radiculopathy               \par \par Procedure:             Bilateral L4-L5 transforaminal epidural steroid injection under fluoroscopic guidance.\par \par Anesthesia: MAC\par \par This procedure was carried out using fluoroscopic guidance.  The risks and benefits of the procedure were discussed extensively with the patient.  The consent of the patient was obtained and the following procedure was performed. The patient was placed in the prone position on the fluoroscopy table and the area was prepped and draped in a sterile fashion.  A timeout was performed with all essential staff present and the site and side were verified.\par \par The right L4-L5 neural foramen was then identified on right oblique "patti dog" anatomical view at the 6 o' clock position using fluoroscopic guidance, and the area was marked. The overlying skin and subcutaneous structures were anesthetized using sterile technique with 1% Lidocaine.   A 22 gauge 5 inch spinal needle was directed toward the inferior (6 o'clock) position of the pedicle, which formed the roof of the identified foramen.  Once in the epidural space, after negative aspiration for heme and CSF, 1cc of Omnipaque contrast was injected to confirm epidural location and assess filling defects and rule out intravascular needle placement. \par \par Lumbar epidurogram showed no intravascular or intrathecal flow pattern.  No blood or CSF was aspirated.  Omnipaque spread medially in epidural space and outlined the exiting nerve root.  After this, 2.5 cc of a mixture of 3 mL of preservative free normal saline plus 80 mg of Kenalog was injected in the epidural space.\par \par Then attention was focused to the left L4-L5 neural foramen.  This was then identified on left oblique "patti dog" anatomical view at the 6 o' clock position using fluoroscopic guidance, and the area was marked.  The overlying skin and subcutaneous structures were anesthetized using sterile technique with 1% Lidocaine.  A 22 gauge 5 inch spinal needle was directed toward the inferior (6 o'clock) position of the pedicle, which formed the roof of the identified foramen.  Once in the epidural space, after negative aspiration for heme and CSF, 1cc of Omnipaque contrast was injected to confirm epidural location and assess filling defects and rule out intravascular needle placement. \par \par The following contrast flow observed: no intravascular or intrathecal flow pattern was noted.  No blood or CSF was aspirated. Omnipaque spread medially in epidural space.  \par \par After this, the remainder of the injectate listed above was injected in the epidural space.\par \par Vital signs remained normal throughout the procedure.  The patient tolerated the procedure well.  There were no immediate complications from the performed procedure.  The patient was instructed to apply ice over the injection sites for twenty minutes every two hours for the next 24 hours.\par \par Disposition:\par      1. The patient was advised to F/U in 1-2 weeks to assess the response to the injection.\par      2. The patient was also instructed to contact me immediately if there were any concerns related to the procedure performed.

## 2023-02-02 ENCOUNTER — APPOINTMENT (OUTPATIENT)
Dept: ORTHOPEDIC SURGERY | Facility: CLINIC | Age: 83
End: 2023-02-02

## 2023-02-09 ENCOUNTER — APPOINTMENT (OUTPATIENT)
Dept: PAIN MANAGEMENT | Facility: CLINIC | Age: 83
End: 2023-02-09
Payer: MEDICARE

## 2023-02-09 VITALS — HEIGHT: 64 IN | BODY MASS INDEX: 29.02 KG/M2 | WEIGHT: 170 LBS

## 2023-02-09 PROCEDURE — 99213 OFFICE O/P EST LOW 20 MIN: CPT

## 2023-02-09 NOTE — ASSESSMENT
[FreeTextEntry1] : A thorough discussion occurred regarding available pain management treatment options including interventional,\par rehabilitative, pharmacological, and alternative modalities with the patient. We will proceed with the following:\par \par Interventional treatment options:\par - None indicated present time\par - Would likely proceed with repeat bilateral L4-L5 TFESI (80 mg Kenalog) with return of claudication symptoms\par - patient with equivocal response to previous RFA with outside physician\par - Patient may be candidate for DCS trial with failure of further conservative care and unwilling to further pursue surgical intervention - not discussed\par - see additional instructions below\par \par Rehabilitative options:\par - patient declines trial of physical therapy at this time\par - Encourage participation in HEP as tolerated\par \par Medication based treatment options:\par - continue Tylenol 500-1000 mg TID PRN mild-moderate pain\par - Continue Celebrex 200 mg daily as needed for moderate pain\par - Continue cyclobenzaprine 5-10 mg TID PRN spasm\par - Can consider trial of gabapentin with failure of interventional therapy\par - see additional instructions below\par \par Complementary treatment options:\par - Weight management and lifestyle modifications discussed\par - continue chiropractic care as needed\par \par Additional treatment recommendations as follows:\par - Follow-up in 3 months or as needed basis\par \par We have discussed the risks, benefits, and alternatives NSAID therapy including but not limited to the risk of bleeding, thrombosis, gastric mucosal irritation/ulceration, allergic reaction and kidney dysfunction; the patient verbalizes an understanding.\par \par I, James Govea acting as scribe, attest that this documentation has been prepared under the direction and in the presence of Provider Bertram Graves DO. \par \par The documentation recorded by the scribe, in my presence, accurately reflects the service I personally performed and the decisions made by me with my edits as appropriate.

## 2023-02-09 NOTE — HISTORY OF PRESENT ILLNESS
[Lower back] : lower back [Sudden] : sudden [9] : 9 [0] : 0 [Dull/Aching] : dull/aching [Localized] : localized [Radiating] : radiating [Intermittent] : intermittent [Household chores] : household chores [Leisure] : leisure [Social interactions] : social interactions [Meds] : meds [Sitting] : sitting [Walking] : walking [Bending forward] : bending forward [Extending back] : extending back [Exercising] : exercising [Stairs] : stairs [FreeTextEntry1] : 2/9/2023- Patient presents for FUV after a bilateral L4-5 TFESI on 1/27/2023. Patient reports significant pain relief (at least 75% pain relief) in her lower back following the injection. Heaviness in the thigh has been significantly reduced, and the patient is able to walk for longer periods of time; she reports that she is able to perform day to day activities like grocery shopping. Patient is not taking prescribed gabapentin.\par \par 12/22/22 -  Patient presents for follow-up visit.  Patient reports pain in the lower back when standing and walking. Patient says pain no longer radiates to the lower extremities, but reports a feeling of heaviness in the legs. She also reports the laminectomy and injections did not relieve any pain.  \par \par 11/21/22 -  Patient presents for reevaluation visit.  She was last seen nearly 2 years ago.  Since her last visit she has undergone L3-L4 lumbar laminectomy with Dr. Varma.  This was on 12/21/2021.  She does not report any appreciable change following surgical intervention.  Patient with ongoing low back pain with radiation to the bilateral buttocks.  There is associated sensation of "heaviness" of the legs.  Significantly worse with standing and walking and improved while seated.  She denies any lower extremity weakness, bladder/bowel dysfunction, or saddle numbness.\par \par 12/14/2020  FUV regarding axial low back pain. Pain radiates up to midback. No pain in buttocks/legs. Has obtained\par prior pain records for review today, feels she had at least 4 lumbar SERA's in the last year. Currently in chiropractic,\par inquires regarding traction therapy. \par \par 11/30/2020 - Patient presents for initial evaluation. She c/o low back pain for 1-2 years. Predominately axial low back pain. No radiating pain into buttocks/legs. Pain is worse with increased activity/household chores, alleviated upon sitting. Feels her walking tolerance is reduced due to pain. Has had physical therapy previously, felt she obtained better response with chiropractic. Previous pain management with Dr. Pierre, had about 5 lumbar SERA's in the last year with equivocal response. Uses Tylenol PRN. \par \par Injections:\par 1) Facet joint RFA L4-5 L5-S1 (10/12/21) Dr. Pierre\par 2) L1-2 LESI (8/11/2020) Dr. Pierre\par 3) Bilateral L4-L5, L5-S1 MBB (5/26/20, 10/8/20) - Dr. Pierre\par 4) Bilateral L4-5 TFESI (1/27/2023)\par \par Previous Spine Surgery:\par 1) L3-L4 laminectomy (12/21/21) - Dr. Varma\par \par Imaging:\par MRI Lumbar Spine (11/19/22) - [facility]\par \par L1-L2: There is a small right foraminal disc extrusion extending slightly above the L1-L2 level. This results in mild to moderate right neural foraminal narrowing. The size of the disc extrusion has decreased slightly since 2/18/2020. There is mild bilateral facet arthropathy.\par L2-L3: Moderate bilateral facet arthropathy.\par L3-L4: Posterior laminectomy. Moderate bilateral facet arthropathy. Minimal bilateral foraminal narrowing.\par L4-L5: Central laminectomy with severe right and moderate left facet arthropathy with ligamentum flavum thickening. There is mass effect upon the spinal canal with indistinction of the nerve roots. There is minimal right foraminal narrowing. There is no left neural foraminal narrowing.\par L5-S1: Moderate to severe right facet arthropathy and moderate left facet arthropathy. There is a minimal right foraminal disc protrusion with mild right foraminal narrowing, unchanged.\par \par Physician Disclaimer: I have personally reviewed and confirmed all HPI data with the patient. [] : no [FreeTextEntry7] : RIGHT KNEE [de-identified] : lumbar mri at [facility]

## 2023-02-20 ENCOUNTER — APPOINTMENT (OUTPATIENT)
Dept: DERMATOLOGY | Facility: CLINIC | Age: 83
End: 2023-02-20
Payer: MEDICARE

## 2023-02-20 PROCEDURE — 99202 OFFICE O/P NEW SF 15 MIN: CPT

## 2023-02-20 RX ORDER — SULFAMETHOXAZOLE AND TRIMETHOPRIM 800; 160 MG/1; MG/1
800-160 TABLET ORAL TWICE DAILY
Qty: 10 | Refills: 0 | Status: DISCONTINUED | COMMUNITY
Start: 2021-12-14 | End: 2023-02-20

## 2023-02-20 RX ORDER — NITROFURANTOIN MACROCRYSTALS 100 MG/1
100 CAPSULE ORAL
Qty: 14 | Refills: 0 | Status: DISCONTINUED | COMMUNITY
Start: 2021-12-31 | End: 2023-02-20

## 2023-02-20 NOTE — HISTORY OF PRESENT ILLNESS
[FreeTextEntry1] : Growths on face [de-identified] : First visit for 82-year-old white female presenting for evaluation of lesions on the face.  Particular concerned about an enlarging lesion on the right cheek.  Also concerned about a new lesion on the right side of the nose\par No history of skin cancer. Detail Level: Zone

## 2023-02-20 NOTE — PHYSICAL EXAM
[Alert] : alert [Oriented x 3] : ~L oriented x 3 [Well Nourished] : well nourished [FreeTextEntry3] : Patient declines full skin exam today\par \par Type II skin\par \par Right mid cheek: 5 x 5 mm purple/blue papule with small central scale\par Right upper nose: 2 x 2 mm purple/blue smooth papule

## 2023-03-07 ENCOUNTER — APPOINTMENT (OUTPATIENT)
Dept: DERMATOLOGY | Facility: CLINIC | Age: 83
End: 2023-03-07
Payer: MEDICARE

## 2023-03-07 PROCEDURE — 99213 OFFICE O/P EST LOW 20 MIN: CPT | Mod: 25

## 2023-03-07 PROCEDURE — 11102 TANGNTL BX SKIN SINGLE LES: CPT

## 2023-03-07 NOTE — PHYSICAL EXAM
[FreeTextEntry3] : large blanching angioma R lateral cheek\par \par few smaller, similar lesions on nose, R cheek

## 2023-03-07 NOTE — HISTORY OF PRESENT ILLNESS
[de-identified] : Pt. c/o lesion on right cheek;  present many years, but enlarging; \par c/o intermittent irritation and bleeding; \par

## 2023-03-07 NOTE — ASSESSMENT
[FreeTextEntry1] : Shave removal of symptomatic angioma R cheek\par \par Therapeutic options and their risks and benefits; along with multiple diagnostic possibilities were discussed at length; risks and benefits of further study were discussed;\par \par The patient was instructed to check portal and/or call the office in one week for biopsy results.\par \par f/u prn if recurs

## 2023-03-14 LAB — CORE LAB BIOPSY: NORMAL

## 2023-03-24 ENCOUNTER — APPOINTMENT (OUTPATIENT)
Dept: FAMILY MEDICINE | Facility: CLINIC | Age: 83
End: 2023-03-24
Payer: MEDICARE

## 2023-03-24 VITALS
WEIGHT: 170 LBS | TEMPERATURE: 97.3 F | RESPIRATION RATE: 16 BRPM | SYSTOLIC BLOOD PRESSURE: 108 MMHG | DIASTOLIC BLOOD PRESSURE: 70 MMHG | HEIGHT: 64 IN | BODY MASS INDEX: 29.02 KG/M2 | HEART RATE: 111 BPM | OXYGEN SATURATION: 95 %

## 2023-03-24 DIAGNOSIS — K52.9 NONINFECTIVE GASTROENTERITIS AND COLITIS, UNSPECIFIED: ICD-10-CM

## 2023-03-24 PROCEDURE — 99214 OFFICE O/P EST MOD 30 MIN: CPT

## 2023-03-24 NOTE — HISTORY OF PRESENT ILLNESS
[FreeTextEntry8] : Pt c/o lump on top of R forehead first noticed 2 days ago. Denies pain, or inc size. \par Pt c/o frequent bowel movements for past year. Pt took probiotic which didn't help. Denies abdominal pain. \par Pt c/o chronic lumbar back pain/stenosis w/ back spasms.

## 2023-03-24 NOTE — PHYSICAL EXAM
[Soft] : abdomen soft [Non Tender] : non-tender [Non-distended] : non-distended [Normal] : affect was normal and insight and judgment were intact [de-identified] : lumbar back pain w/ paraspinal spasms [de-identified] : cyst on scalp nontender

## 2023-03-24 NOTE — ASSESSMENT
[FreeTextEntry1] : frequent stools - restart dicyclomine prn. inc fiber. avoid laxatives\par sebaceous cyst - reassurance. monitor\par lumbar stenosis w/ radiculopathy - refill flexeirl prn

## 2023-04-06 ENCOUNTER — APPOINTMENT (OUTPATIENT)
Dept: FAMILY MEDICINE | Facility: CLINIC | Age: 83
End: 2023-04-06

## 2023-04-28 NOTE — ASU PATIENT PROFILE, ADULT - PAIN CHRONIC, PROFILE
Outpatient History and Physical    Judith Marie  4/28/2023    Referring Physician: Crys Walker MD     Planned Procedure: ESOPHAGOGASTRODUODENOSCOPY TRANSORAL FLEX DIAG [37707] (EGD (ESOPHAGOGASTRODUODENOSCOPY))  COLONOSCOPY DIAGNOSTIC [01417] (COLONOSCOPY)    History: 68 Leonel Lawson MD   yes

## 2023-05-11 ENCOUNTER — APPOINTMENT (OUTPATIENT)
Dept: PAIN MANAGEMENT | Facility: CLINIC | Age: 83
End: 2023-05-11
Payer: MEDICARE

## 2023-05-11 VITALS — BODY MASS INDEX: 29.88 KG/M2 | WEIGHT: 175 LBS | HEIGHT: 64 IN

## 2023-05-11 DIAGNOSIS — M47.816 SPONDYLOSIS W/OUT MYELOPATHY OR RADICULOPATHY, LUMBAR REGION: ICD-10-CM

## 2023-05-11 PROCEDURE — 99214 OFFICE O/P EST MOD 30 MIN: CPT

## 2023-05-11 NOTE — HISTORY OF PRESENT ILLNESS
[Lower back] : lower back [Gradual] : gradual [9] : 9 [0] : 0 [Dull/Aching] : dull/aching [Localized] : localized [Radiating] : radiating [Intermittent] : intermittent [Household chores] : household chores [Leisure] : leisure [Social interactions] : social interactions [Meds] : meds [Sitting] : sitting [Walking] : walking [Bending forward] : bending forward [Extending back] : extending back [Exercising] : exercising [Stairs] : stairs [FreeTextEntry1] : 5/11/2023 - Patient presents for FUV regarding their lower back pain.  Patient reports low back pain with radiation to the bilateral lower extremities.  Worse with any standing or walking.  Improved immediately while seated.  Patient also reports concurrent knee pain.  She is unable to recall details regarding her medical care.\par \par 2/9/2023- Patient presents for FUV after a bilateral L4-5 TFESI on 1/27/2023. Patient reports significant pain relief (at least 75% pain relief) in her lower back following the injection. Heaviness in the thigh has been significantly reduced, and the patient is able to walk for longer periods of time; she reports that she is able to perform day to day activities like grocery shopping. Patient is not taking prescribed gabapentin.\par \par 12/22/22 -  Patient presents for follow-up visit.  Patient reports pain in the lower back when standing and walking. Patient says pain no longer radiates to the lower extremities, but reports a feeling of heaviness in the legs. She also reports the laminectomy and injections did not relieve any pain.  \par \par 11/21/22 -  Patient presents for reevaluation visit.  She was last seen nearly 2 years ago.  Since her last visit she has undergone L3-L4 lumbar laminectomy with Dr. Varma.  This was on 12/21/2021.  She does not report any appreciable change following surgical intervention.  Patient with ongoing low back pain with radiation to the bilateral buttocks.  There is associated sensation of "heaviness" of the legs.  Significantly worse with standing and walking and improved while seated.  She denies any lower extremity weakness, bladder/bowel dysfunction, or saddle numbness.\par \par 12/14/2020  FUV regarding axial low back pain. Pain radiates up to midback. No pain in buttocks/legs. Has obtained\par prior pain records for review today, feels she had at least 4 lumbar SERA's in the last year. Currently in chiropractic,\par inquires regarding traction therapy. \par \par 11/30/2020 - Patient presents for initial evaluation. She c/o low back pain for 1-2 years. Predominately axial low back pain. No radiating pain into buttocks/legs. Pain is worse with increased activity/household chores, alleviated upon sitting. Feels her walking tolerance is reduced due to pain. Has had physical therapy previously, felt she obtained better response with chiropractic. Previous pain management with Dr. Pierre, had about 5 lumbar SERA's in the last year with equivocal response. Uses Tylenol PRN. \par \par Injections:\par 1) Facet joint RFA L4-5 L5-S1 (10/12/21) Dr. Pierre\par 2) L1-2 LESI (8/11/2020) Dr. Pierre\par 3) Bilateral L4-L5, L5-S1 MBB (5/26/20, 10/8/20) - Dr. Pierre\par 4) Bilateral L4-5 TFESI (1/27/2023)\par \par Previous Spine Surgery:\par 1) L3-L4 laminectomy (12/21/21) - Dr. Varma\par \par Imaging:\par 1) MRI Lumbar Spine (11/19/22) - [facility]\par \par L1-L2: There is a small right foraminal disc extrusion extending slightly above the L1-L2 level. This results in mild to moderate right neural foraminal narrowing. The size of the disc extrusion has decreased slightly since 2/18/2020. There is mild bilateral facet arthropathy.\par L2-L3: Moderate bilateral facet arthropathy.\par L3-L4: Posterior laminectomy. Moderate bilateral facet arthropathy. Minimal bilateral foraminal narrowing.\par L4-L5: Central laminectomy with severe right and moderate left facet arthropathy with ligamentum flavum thickening. There is mass effect upon the spinal canal with indistinction of the nerve roots. There is minimal right foraminal narrowing. There is no left neural foraminal narrowing.\par L5-S1: Moderate to severe right facet arthropathy and moderate left facet arthropathy. There is a minimal right foraminal disc protrusion with mild right foraminal narrowing, unchanged.\par \par Physician Disclaimer: I have personally reviewed and confirmed all HPI data with the patient. [] : no [FreeTextEntry7] : RIGHT KNEE [de-identified] : lumbar mri at [facility]

## 2023-05-11 NOTE — ASSESSMENT
[FreeTextEntry1] : A thorough discussion occurred regarding available pain management treatment options including interventional,\par rehabilitative, pharmacological, and alternative modalities with the patient. We will proceed with the following:\par \par Interventional treatment options:\par - Proceed with L5-S1 LESI (80 mg Kenalog) with fluoroscopic guidance\par - patient with equivocal response to previous RFA with outside physician\par - Patient may be candidate for DCS trial with failure of further conservative care and unwilling to further pursue surgical decompression- not discussed\par - see additional instructions below\par \par Rehabilitative options:\par - patient declines trial of physical therapy at this time\par - Encourage participation in HEP as tolerated\par \par Medication based treatment options:\par - continue Tylenol 500-1000 mg TID PRN mild-moderate pain\par - Continue Celebrex 200 mg daily as needed for moderate pain\par - Continue cyclobenzaprine 5-10 mg TID PRN spasm\par - Can consider trial of gabapentin with failure of interventional therapy\par - see additional instructions below\par \par Complementary treatment options:\par - Weight management and lifestyle modifications discussed\par - continue chiropractic care as needed\par \par Additional treatment recommendations as follows:\par - Orthopedic spine surgical reevaluation - patient advised to present with family member (I suspect some cognitive decline and recall issues)\par - Follow up 1-2 weeks post injection for assessment of efficacy and further treatment recommendations\par \par The risks, benefits and alternatives of the proposed procedure were explained in detail with the patient. The risks outlined include but are not limited to infection, bleeding, post- dural puncture headache, nerve injury, a temporary increase in pain, failure to resolve symptoms, allergic reaction, and possible elevation of blood sugar in diabetics if using corticosteroid.  All questions were answered to patient's apparent satisfaction and he/she verbalized an understanding.\par \par We have discussed the risks, benefits, and alternatives NSAID therapy including but not limited to the risk of bleeding, thrombosis, gastric mucosal irritation/ulceration, allergic reaction and kidney dysfunction; the patient verbalizes an understanding.\par \par VITO, James Govea acting as scribe, attest that this documentation has been prepared under the direction and in the presence of Provider Bertram Graves DO. \par \par The documentation recorded by the scribe, in my presence, accurately reflects the service I personally performed and the decisions made by me with my edits as appropriate.

## 2023-05-11 NOTE — PHYSICAL EXAM
[de-identified] : Constitutional:\par - No acute distress\par - Well developed; well nourished\par \par Neurological:\par - normal mood and affect\par - alert and oriented x 3\par \par Cardiovascular:\par - grossly normal\par \par Lumbar Spine Exam:\par \par Inspection:\par erythema (-)\par ecchymosis (-)\par rashes (-)\par alignment: no scoliosis\par Well-healed surgical scar\par \par Palpation:\par Midline lumbar tenderness:             (-)\par midline thoracic tenderness:          (-)\par Lumbar paraspinal tenderness:      L (-) ; R (-)\par thoracic paraspinal tenderness:    L (-) ; R (-)\par sciatic nerve tenderness :             L (-) ; R (-)\par SI joint tenderness:                        L (-) ; R (-)\par GTB tenderness:                            L (-);  R (-)\par \par ROM:  full with stiffness and extension\par Pain with extremes of extension\par \par Strength:\par                                    Right       Left   \par Hip Flexion:                (5/5)       (5/5)\par Quadriceps:               (5/5)       (5/5)\par Hamstrings:                (5/5)       (5/5)\par Ankle Dorsiflexion:     (5/5)       (5/5)\par EHL:                           (5/5)       (5/5)\par Ankle Plantarflexion:  (5/5)       (5/5)\par \par Special Tests:\par SLR:                           R (EQ) ; L (EQ)\par Facet loading:            R (+) ; L (+)\par STEWART test:               R (-) ; L (-)\par Hamstring tightness:  R (-);  L (-)\par \par Neurologic:\par SI LT throughout right lower extremity\par SI LT throughout left lower extremity\par \par Reflexes normal and symmetric bilateral lower extremities\par \par Gait:\par Mildly antalgic gait\par Ambulates with cane

## 2023-05-30 ENCOUNTER — APPOINTMENT (OUTPATIENT)
Dept: FAMILY MEDICINE | Facility: CLINIC | Age: 83
End: 2023-05-30

## 2023-05-31 ENCOUNTER — APPOINTMENT (OUTPATIENT)
Dept: PAIN MANAGEMENT | Facility: CLINIC | Age: 83
End: 2023-05-31

## 2023-06-08 ENCOUNTER — APPOINTMENT (OUTPATIENT)
Dept: CARDIOLOGY | Facility: CLINIC | Age: 83
End: 2023-06-08
Payer: MEDICARE

## 2023-06-08 ENCOUNTER — NON-APPOINTMENT (OUTPATIENT)
Age: 83
End: 2023-06-08

## 2023-06-08 VITALS
HEART RATE: 100 BPM | DIASTOLIC BLOOD PRESSURE: 83 MMHG | HEIGHT: 64 IN | OXYGEN SATURATION: 96 % | BODY MASS INDEX: 29.02 KG/M2 | WEIGHT: 170 LBS | SYSTOLIC BLOOD PRESSURE: 144 MMHG

## 2023-06-08 PROCEDURE — 99214 OFFICE O/P EST MOD 30 MIN: CPT | Mod: 25

## 2023-06-08 PROCEDURE — 93000 ELECTROCARDIOGRAM COMPLETE: CPT

## 2023-06-08 NOTE — DISCUSSION/SUMMARY
[FreeTextEntry1] : 82 year old female with a history of 3 vessel CABG including LIMA to LAD for triple vessel CAD with systolic function 45-50% seen on TTE 01/2017.  Repeat TTE 2018 shows low-normal LV function with mild valvular regurgitation.  She has done well post-operatively and is compliant with meds. \par \par About 2 weeks she had mechanical fall and was hospitalized at [facility].  She states that they found something wrong with carotid artery and was advised to f/u. She also mentions that she had a biopsy of her lungs.  She tan snot know the results of these tests.  She is feeling well - denies CP, SOB, diaphoresis, palpitations, dizziness, syncope, LE edema, PND, orthopnea. \par She has appt with oncology this afternoon  \par \par Nuclear stress test 12/2018 fair exercise tolerance, normal myocardial perfusion\par TTE 11/2018 EF 50-55% with inf wall hypokinesis, mild DD, mild TR\par TTE 12/2021 EF 55%, mild TR\par \par c/w metoprolol, lisinopril, amlodipine\par c/w ASA \par c/w statin, goal LDL <70\par repeat labs\par repeat TTE\par [facility] pharm nuc stress test to eval for ischemia\par \par Pt will return in 6 mnths or sooner as needed but I encouraged communication via phone or portal if necessary. \par The described plan was discussed with the pt.  All questions and concerns were addressed to the best of my knowledge.\par

## 2023-06-08 NOTE — HISTORY OF PRESENT ILLNESS
[FreeTextEntry1] : 82 year old female with a history of 3 vessel CABG including LIMA to LAD for triple vessel CAD with systolic function 45-50% seen on TTE 01/2017.  Repeat TTE 2018 shows low-normal LV function with mild valvular regurgitation.  She has done well post-operatively and is compliant with meds. \par \par About 2 weeks she had mechanical fall and was hospitalized at [facility].  She states that they found something wrong with carotid artery and was advised to f/u. She also mentions that she had a biopsy of her lungs.  She does not know the results of these tests.  She is feeling well - denies CP, SOB, diaphoresis, palpitations, dizziness, syncope, LE edema, PND, orthopnea. \par She has appt with oncology this afternoon  \par \par Nuclear stress test 12/2018 fair exercise tolerance, normal myocardial perfusion\par TTE 11/2018 EF 50-55% with inf wall hypokinesis, mild DD, mild TR\par TTE 12/2021 EF 55%, mild TR\par \par \par

## 2023-06-08 NOTE — PHYSICAL EXAM
[Well Developed] : well developed [Well Nourished] : well nourished [No Acute Distress] : no acute distress [Normal Conjunctiva] : normal conjunctiva [Normal Venous Pressure] : normal venous pressure [No Carotid Bruit] : no carotid bruit [Normal S1, S2] : normal S1, S2 [No Murmur] : no murmur [No Rub] : no rub [No Gallop] : no gallop [Clear Lung Fields] : clear lung fields [Good Air Entry] : good air entry [No Respiratory Distress] : no respiratory distress  [Soft] : abdomen soft [Non Tender] : non-tender [No Masses/organomegaly] : no masses/organomegaly [Normal Bowel Sounds] : normal bowel sounds [No Edema] : no edema [No Cyanosis] : no cyanosis [No Clubbing] : no clubbing [No Varicosities] : no varicosities [No Rash] : no rash [No Skin Lesions] : no skin lesions [Moves all extremities] : moves all extremities [No Focal Deficits] : no focal deficits [Normal Speech] : normal speech [Alert and Oriented] : alert and oriented [Normal memory] : normal memory [de-identified] : ambulates with cane

## 2023-06-09 ENCOUNTER — APPOINTMENT (OUTPATIENT)
Dept: FAMILY MEDICINE | Facility: CLINIC | Age: 83
End: 2023-06-09
Payer: MEDICARE

## 2023-06-09 ENCOUNTER — APPOINTMENT (OUTPATIENT)
Dept: ORTHOPEDIC SURGERY | Facility: CLINIC | Age: 83
End: 2023-06-09

## 2023-06-09 VITALS
WEIGHT: 170 LBS | HEART RATE: 106 BPM | OXYGEN SATURATION: 95 % | RESPIRATION RATE: 16 BRPM | DIASTOLIC BLOOD PRESSURE: 80 MMHG | SYSTOLIC BLOOD PRESSURE: 126 MMHG | BODY MASS INDEX: 29.02 KG/M2 | TEMPERATURE: 97.2 F | HEIGHT: 64 IN

## 2023-06-09 PROCEDURE — 99214 OFFICE O/P EST MOD 30 MIN: CPT

## 2023-06-09 NOTE — HISTORY OF PRESENT ILLNESS
[Post-hospitalization from ___ Hospital] : Post-hospitalization from [unfilled] Hospital [Admitted on: ___] : The patient was admitted on [unfilled] [Discharged on ___] : discharged on [unfilled] [FreeTextEntry2] : Pt s/p hospitalization to [facility] 2 weeks ago she had mechanical fall at home. She states that they found something wrong with carotid artery and was advised to f/u. Pt had CT guided lung biopsy which she stated was lung cancer. Pt has started seeing onc at [facility]. Pt states they advise chemotx, pt not a candidate at this time for surgery. She is feeling well - denies CP, SOB, diaphoresis, palpitations, dizziness, syncope, LE edema, PND, orthopnea. Pt also found to have carotid stenosis pt advised to f/u with vascular surgery.

## 2023-06-09 NOTE — ASSESSMENT
[FreeTextEntry1] : lung CA - f/u with hemeonc for tx\par CAD, HTN - refill amlodipine, lisinopril, metoprolol\par carotid stenosis - pt to make f/u appt with vascular\par chronic knee and back pain - celebrex prn

## 2023-06-19 ENCOUNTER — APPOINTMENT (OUTPATIENT)
Dept: PAIN MANAGEMENT | Facility: CLINIC | Age: 83
End: 2023-06-19

## 2023-07-06 ENCOUNTER — APPOINTMENT (OUTPATIENT)
Dept: ORTHOPEDIC SURGERY | Facility: CLINIC | Age: 83
End: 2023-07-06
Payer: MEDICARE

## 2023-07-06 ENCOUNTER — NON-APPOINTMENT (OUTPATIENT)
Age: 83
End: 2023-07-06

## 2023-07-06 VITALS
BODY MASS INDEX: 29.02 KG/M2 | OXYGEN SATURATION: 99 % | HEART RATE: 82 BPM | HEIGHT: 64 IN | DIASTOLIC BLOOD PRESSURE: 74 MMHG | WEIGHT: 170 LBS | SYSTOLIC BLOOD PRESSURE: 126 MMHG

## 2023-07-06 PROCEDURE — 73564 X-RAY EXAM KNEE 4 OR MORE: CPT | Mod: 50

## 2023-07-06 PROCEDURE — 99214 OFFICE O/P EST MOD 30 MIN: CPT | Mod: 25

## 2023-07-06 PROCEDURE — 20610 DRAIN/INJ JOINT/BURSA W/O US: CPT | Mod: 50

## 2023-07-06 NOTE — DISCUSSION/SUMMARY
[de-identified] : Bilateral knee osteoarthritis\par \par Extensive discussion of the natural history of this disease was had with the patient.  We discussed the treatment options ranging from conservative therapy which includes anti-inflammatories, steroid injections, physical therapy, weight loss, and activity modification.  We did discuss that the ultimate treatment for arthritis is a joint replacement.  She like to hold off on surgery for now.  At this time we will continue conservative treatment.  \par -Patient elected for steroid injection in both knees today.\par Patient will follow-up as needed if the pain returns or does not improve.

## 2023-07-06 NOTE — PROCEDURE
[de-identified] : 7/6/2023–bilateral knee injection - Steroid\par The risks, benefits, and alternatives of the injection were reviewed/discussed with the patient today in office and all of their questions were answered. The patient consented to the procedure. The inferolateral injection site was prepped with chloroprep.  Cold spray was utilized to numb the skin. Utilizing sterile technique, the knee was injected with 1 ml 40 mg methylprednisolone, 4 ml 1% Lidocaine, 5 mL 0.25% Bupivicaine. A sterile bandage was applied. The patient tolerated the procedure well and there were no complications.\par \par

## 2023-07-06 NOTE — HISTORY OF PRESENT ILLNESS
[de-identified] : 7/6/2023–patient presents with bilateral knee pain right worse than left.  States only going on for a long time.  She reports she had a steroid injection 5 years ago in the left knee.  That worked well.  Recently had steroid injections in the right knee in December which helped and then some gel injections which she states made it worse.  Takes Tylenol for the pain.  Uses a cane to ambulate.  Does feel this limits her.  She is currently doing physical therapy.  Denies allergies, on 81 aspirin, denies tobacco use, past medical significant for cardiomyopathy and back issues.  States CKD and COPD on the chart are not accurate.

## 2023-07-06 NOTE — PHYSICAL EXAM
[de-identified] : General Appearance: normal without acute distress\par Mental: Alert and oriented x 3\par Psych/affect: appropriate, cooperative\par Gait: Antalgic gait\par \par Bilateral lower extremity\par Hip: Normal ROM without pain on IR/ER\par \par Knee\par Inspection: no effusion\par Wounds: None\par Alignment: Neutral\par Palpation: tender to palpation at medial joint line\par ROM active (in degrees): 0-130 with crepitus/pain through the arc of motion\par Ligamentous laxity: all ligaments appear stable, stable to varus stress test, stable to valgus stress test\par Meniscal Test: Negative McMurrays, negative Reid.\par Muscle Test: good quad strength. [de-identified] : 7/6/2023–bilateral knee xrays, standing AP/Lateral and Merchant films, and 45 degree PA standing view are reviewed and demonstrate diffuse tricompartmental degenerative arthritis, medial joint space narrowing, marginal osteophytes, bone on bone with sclerosis on right, patellofemoral joint space narrowing\par

## 2023-07-13 ENCOUNTER — APPOINTMENT (OUTPATIENT)
Dept: FAMILY MEDICINE | Facility: CLINIC | Age: 83
End: 2023-07-13
Payer: MEDICARE

## 2023-07-13 VITALS
HEIGHT: 64 IN | BODY MASS INDEX: 29.02 KG/M2 | DIASTOLIC BLOOD PRESSURE: 79 MMHG | TEMPERATURE: 96 F | SYSTOLIC BLOOD PRESSURE: 150 MMHG | WEIGHT: 170 LBS | OXYGEN SATURATION: 97 % | HEART RATE: 107 BPM

## 2023-07-13 DIAGNOSIS — L03.011 CELLULITIS OF RIGHT FINGER: ICD-10-CM

## 2023-07-13 PROCEDURE — 99214 OFFICE O/P EST MOD 30 MIN: CPT

## 2023-07-15 NOTE — ASSESSMENT
[FreeTextEntry1] : lung CA - refer to oncology for further mgmt\par R thumb paronychia - bactrim course. Possible adverse effects discussed with pt

## 2023-07-15 NOTE — PHYSICAL EXAM
[Normal] : normal rate, regular rhythm, normal S1 and S2 and no murmur heard [de-identified] : R thumb paronychia, redness

## 2023-07-15 NOTE — HISTORY OF PRESENT ILLNESS
[de-identified] : Pt was seeing onc Dr. Howe for dx of lung CA. Recent PET scan done 6/19/23 showed active R pulm nodule 2.4cm x 2.1cm w/ SUV 16.1. Pt was advised she should start chemoradiation. Pt would like to switch to [facility] for mgmt for her lung CA. \par Pt c/o finger infection R thumb became redder 1 week ago

## 2023-07-18 DIAGNOSIS — Z92.89 PERSONAL HISTORY OF OTHER MEDICAL TREATMENT: ICD-10-CM

## 2023-07-18 DIAGNOSIS — Z88.9 ALLERGY STATUS TO UNSPECIFIED DRUGS, MEDICAMENTS AND BIOLOGICAL SUBSTANCES: ICD-10-CM

## 2023-07-18 DIAGNOSIS — J01.00 ACUTE MAXILLARY SINUSITIS, UNSPECIFIED: ICD-10-CM

## 2023-07-18 DIAGNOSIS — Z03.89 ENCOUNTER FOR OBSERVATION FOR OTHER SUSPECTED DISEASES AND CONDITIONS RULED OUT: ICD-10-CM

## 2023-07-18 DIAGNOSIS — Z13.228 ENCOUNTER FOR SCREENING FOR OTHER SUSPECTED ENDOCRINE DISORDER: ICD-10-CM

## 2023-07-18 DIAGNOSIS — Z87.2 PERSONAL HISTORY OF DISEASES OF THE SKIN AND SUBCUTANEOUS TISSUE: ICD-10-CM

## 2023-07-18 DIAGNOSIS — Z23 ENCOUNTER FOR IMMUNIZATION: ICD-10-CM

## 2023-07-18 DIAGNOSIS — R39.9 UNSPECIFIED SYMPTOMS AND SIGNS INVOLVING THE GENITOURINARY SYSTEM: ICD-10-CM

## 2023-07-18 DIAGNOSIS — Z13.0 ENCOUNTER FOR SCREENING FOR OTHER SUSPECTED ENDOCRINE DISORDER: ICD-10-CM

## 2023-07-18 DIAGNOSIS — Z87.442 PERSONAL HISTORY OF URINARY CALCULI: ICD-10-CM

## 2023-07-18 DIAGNOSIS — R07.89 OTHER CHEST PAIN: ICD-10-CM

## 2023-07-18 DIAGNOSIS — N39.0 URINARY TRACT INFECTION, SITE NOT SPECIFIED: ICD-10-CM

## 2023-07-18 DIAGNOSIS — N20.0 CALCULUS OF KIDNEY: ICD-10-CM

## 2023-07-18 DIAGNOSIS — M54.50 LOW BACK PAIN, UNSPECIFIED: ICD-10-CM

## 2023-07-18 DIAGNOSIS — S39.011A STRAIN OF MUSCLE, FASCIA AND TENDON OF ABDOMEN, INITIAL ENCOUNTER: ICD-10-CM

## 2023-07-18 DIAGNOSIS — Z86.010 PERSONAL HISTORY OF COLONIC POLYPS: ICD-10-CM

## 2023-07-18 DIAGNOSIS — H66.92 OTITIS MEDIA, UNSPECIFIED, LEFT EAR: ICD-10-CM

## 2023-07-18 DIAGNOSIS — Z92.29 PERSONAL HISTORY OF OTHER DRUG THERAPY: ICD-10-CM

## 2023-07-18 DIAGNOSIS — Z86.018 PERSONAL HISTORY OF OTHER BENIGN NEOPLASM: ICD-10-CM

## 2023-07-18 DIAGNOSIS — Z01.818 ENCOUNTER FOR OTHER PREPROCEDURAL EXAMINATION: ICD-10-CM

## 2023-07-18 DIAGNOSIS — Z13.0 ENCOUNTER FOR SCREENING FOR DISEASES OF THE BLOOD AND BLOOD-FORMING ORGANS AND CERTAIN DISORDERS INVOLVING THE IMMUNE MECHANISM: ICD-10-CM

## 2023-07-18 DIAGNOSIS — Z01.810 ENCOUNTER FOR PREPROCEDURAL CARDIOVASCULAR EXAMINATION: ICD-10-CM

## 2023-07-18 DIAGNOSIS — Z13.29 ENCOUNTER FOR SCREENING FOR OTHER SUSPECTED ENDOCRINE DISORDER: ICD-10-CM

## 2023-07-18 DIAGNOSIS — Z13.220 ENCOUNTER FOR SCREENING FOR LIPOID DISORDERS: ICD-10-CM

## 2023-07-18 DIAGNOSIS — Z87.898 PERSONAL HISTORY OF OTHER SPECIFIED CONDITIONS: ICD-10-CM

## 2023-07-18 DIAGNOSIS — Z09 ENCOUNTER FOR FOLLOW-UP EXAMINATION AFTER COMPLETED TREATMENT FOR CONDITIONS OTHER THAN MALIGNANT NEOPLASM: ICD-10-CM

## 2023-07-18 DIAGNOSIS — Z86.03 PERSONAL HISTORY OF NEOPLASM OF UNCERTAIN BEHAVIOR: ICD-10-CM

## 2023-07-18 RX ORDER — HYALURONATE SODIUM 20 MG/2 ML
20 SYRINGE (ML) INTRAARTICULAR
Qty: 2 | Refills: 0 | Status: DISCONTINUED | OUTPATIENT
Start: 2021-09-13 | End: 2023-07-18

## 2023-07-18 RX ORDER — BLOOD-GLUCOSE METER
KIT MISCELLANEOUS
Qty: 1 | Refills: 0 | Status: DISCONTINUED | COMMUNITY
Start: 2021-01-25 | End: 2023-07-18

## 2023-07-18 RX ORDER — HYALURONATE SODIUM 20 MG/2 ML
20 SYRINGE (ML) INTRAARTICULAR
Qty: 1 | Refills: 0 | Status: DISCONTINUED | OUTPATIENT
Start: 2021-09-10 | End: 2023-07-18

## 2023-07-18 RX ORDER — ONDANSETRON 4 MG/1
4 TABLET, ORALLY DISINTEGRATING ORAL EVERY 6 HOURS
Qty: 60 | Refills: 0 | Status: DISCONTINUED | COMMUNITY
Start: 2021-12-27 | End: 2023-07-18

## 2023-07-18 RX ORDER — HYALURONATE SODIUM 30 MG/2 ML
30 SYRINGE (ML) INTRAARTICULAR
Qty: 12 | Refills: 0 | Status: DISCONTINUED | COMMUNITY
Start: 2021-09-16 | End: 2023-07-18

## 2023-07-18 RX ORDER — CYCLOBENZAPRINE HYDROCHLORIDE 5 MG/1
5 TABLET, FILM COATED ORAL 3 TIMES DAILY
Qty: 270 | Refills: 0 | Status: DISCONTINUED | COMMUNITY
Start: 2021-01-25 | End: 2023-07-18

## 2023-07-27 ENCOUNTER — RX RENEWAL (OUTPATIENT)
Age: 83
End: 2023-07-27

## 2023-07-27 ENCOUNTER — APPOINTMENT (OUTPATIENT)
Dept: CARDIOLOGY | Facility: CLINIC | Age: 83
End: 2023-07-27
Payer: MEDICARE

## 2023-07-27 PROCEDURE — 93016 CV STRESS TEST SUPVJ ONLY: CPT

## 2023-07-27 PROCEDURE — 93017 CV STRESS TEST TRACING ONLY: CPT

## 2023-07-27 PROCEDURE — A9500: CPT

## 2023-07-27 PROCEDURE — 78452 HT MUSCLE IMAGE SPECT MULT: CPT

## 2023-07-27 PROCEDURE — 93018 CV STRESS TEST I&R ONLY: CPT

## 2023-07-29 ENCOUNTER — OUTPATIENT (OUTPATIENT)
Dept: OUTPATIENT SERVICES | Facility: HOSPITAL | Age: 83
LOS: 1 days | Discharge: HOME | End: 2023-07-29
Payer: MEDICARE

## 2023-07-29 DIAGNOSIS — Z98.49 CATARACT EXTRACTION STATUS, UNSPECIFIED EYE: Chronic | ICD-10-CM

## 2023-07-29 DIAGNOSIS — Z95.1 PRESENCE OF AORTOCORONARY BYPASS GRAFT: Chronic | ICD-10-CM

## 2023-07-29 DIAGNOSIS — K62.89 OTHER SPECIFIED DISEASES OF ANUS AND RECTUM: Chronic | ICD-10-CM

## 2023-07-29 DIAGNOSIS — C34.90 MALIGNANT NEOPLASM OF UNSPECIFIED PART OF UNSPECIFIED BRONCHUS OR LUNG: ICD-10-CM

## 2023-08-01 ENCOUNTER — APPOINTMENT (OUTPATIENT)
Dept: HEMATOLOGY ONCOLOGY | Facility: CLINIC | Age: 83
End: 2023-08-01
Payer: MEDICARE

## 2023-08-01 VITALS
WEIGHT: 170 LBS | DIASTOLIC BLOOD PRESSURE: 81 MMHG | RESPIRATION RATE: 17 BRPM | HEART RATE: 110 BPM | BODY MASS INDEX: 29.18 KG/M2 | SYSTOLIC BLOOD PRESSURE: 155 MMHG | OXYGEN SATURATION: 98 % | TEMPERATURE: 98.1 F

## 2023-08-01 PROCEDURE — 99204 OFFICE O/P NEW MOD 45 MIN: CPT

## 2023-08-01 NOTE — PHYSICAL EXAM
[Restricted in physically strenuous activity but ambulatory and able to carry out work of a light or sedentary nature] : Status 1- Restricted in physically strenuous activity but ambulatory and able to carry out work of a light or sedentary nature, e.g., light house work, office work [Normal] : affect appropriate [de-identified] : looks well  [de-identified] : sternotomy scar  RRR no mm [de-identified] : walks with very slowly  with cane

## 2023-08-01 NOTE — HISTORY OF PRESENT ILLNESS
[de-identified] : 83 y/o female  10 pack year smoker quit 2003. Admitted to [facility] 5/22--5/25/23 after a fall. CT chest showed bilat pulm nodules largest 2.4 in RLL R hilar REZA and mediastinal REZA Heterogenous L adrenal nodule. CT guided bx RLL nodule ( [facility]) 5/26/23  adenocarcinoma with cribriform and acinar pattern,TTF1 positive p63 negative    Brain MRI was negative ( per note)  PET  on 6/17/23  showed single FDG avid pulmonary nodule RLL , other subcentimeter nodules not avid Seen by oncology at [facility]   Dr   who recommended referral to [facility]. Patient was not   clear about what was recommended   and she did not schedule any appointments. She did not see thoracic surgery or rad oncology.  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

## 2023-08-01 NOTE — ASSESSMENT
[FreeTextEntry1] :    referred to Rad Oncology and to thoracic surgery   After RT/ or after surgery.

## 2023-08-03 ENCOUNTER — APPOINTMENT (OUTPATIENT)
Dept: ORTHOPEDIC SURGERY | Facility: CLINIC | Age: 83
End: 2023-08-03
Payer: MEDICARE

## 2023-08-03 VITALS
BODY MASS INDEX: 29.02 KG/M2 | WEIGHT: 170 LBS | HEIGHT: 64 IN | OXYGEN SATURATION: 99 % | DIASTOLIC BLOOD PRESSURE: 82 MMHG | SYSTOLIC BLOOD PRESSURE: 142 MMHG | HEART RATE: 92 BPM

## 2023-08-03 PROCEDURE — 99214 OFFICE O/P EST MOD 30 MIN: CPT

## 2023-08-03 NOTE — DISCUSSION/SUMMARY
[de-identified] : Bilateral knee osteoarthritis  Extensive discussion of the natural history of this disease was had with the patient.  We discussed the treatment options ranging from conservative therapy which includes anti-inflammatories, steroid injections, physical therapy, weight loss, and activity modification.  We did discuss that the ultimate treatment for arthritis is a joint replacement.  She like to hold off on surgery for now.  At this time we will continue conservative treatment.   Patient would like to try gel injections.  She will follow-up once these are approved.  She cannot take NSAIDs recommend trying Tylenol and topical Voltaren gel.  The patient's current medication management of their orthopedic diagnosis was reviewed today: (1) We discussed a comprehensive treatment plan that included possible pharmaceutical management involving the use of prescription strength medications including but not limited to options such as oral Ibuprofen 400mg QID, once daily Meloxicam 15 mg, or 500-650 mg Tylenol versus over the counter oral medications and topical prescription NSAID Pennsaid vs over the counter Voltaren gel. (2) There is a moderate risk of morbidity with further treatment, especially from use of prescription strength medications and possible side effects of these medications which include upset stomach with oral medications, skin reactions to topical medications and cardiac/renal issues with long term use. (3) I recommended that the patient follow-up with their medical physician to discuss any significant specific potential issues with long term medication use such as interactions with current medications or with exacerbation of underlying medical comorbidities. (4) The benefits and risks associated with use of injectable, oral or topical, prescription and over the counter anti-inflammatory medications were discussed with the patient. The patient voiced understanding of the risks including but not limited to bleeding, stroke, kidney dysfunction, heart disease, and were referred to the black box warning label for further information.

## 2023-08-03 NOTE — PHYSICAL EXAM
[de-identified] : Bilateral lower extremity Hip: Normal ROM without pain on IR/ER Knee Inspection: no effusion Wounds: None Alignment: Neutral Palpation: tender to palpation at medial joint line ROM active (in degrees): 0-130 with crepitus/pain through the arc of motion Ligamentous laxity: all ligaments appear stable, stable to varus stress test, stable to valgus stress test Meniscal Test: Negative McMurrays, negative Reid. Muscle Test: good quad strength. [de-identified] : 7/6/2023-bilateral knee xrays, standing AP/Lateral and Merchant films, and 45 degree PA standing view are reviewed and demonstrate diffuse tricompartmental degenerative arthritis, medial joint space narrowing, marginal osteophytes, bone on bone with sclerosis on right, patellofemoral joint space narrowing\par

## 2023-08-03 NOTE — HISTORY OF PRESENT ILLNESS
[de-identified] : 8/3/23: Patient here for follow-up bilateral knee pain.  States left knee is worse than the right at this time.  States steroid injections only provided mild relief.  She would like to try the gel injections as she is not ready for surgery.  Only taking Tylenol for pain as she is not allowed to take anti-inflammatories.  7/6/2023-patient presents with bilateral knee pain right worse than left.  States only going on for a long time.  She reports she had a steroid injection 5 years ago in the left knee.  That worked well.  Recently had steroid injections in the right knee in December which helped and then some gel injections which she states made it worse.  Takes Tylenol for the pain.  Uses a cane to ambulate.  Does feel this limits her.  She is currently doing physical therapy.  Denies allergies, on 81 aspirin, denies tobacco use, past medical significant for cardiomyopathy and back issues.  States CKD and COPD on the chart are not accurate.

## 2023-08-07 ENCOUNTER — RESULT REVIEW (OUTPATIENT)
Age: 83
End: 2023-08-07

## 2023-08-07 LAB — SURGICAL PATHOLOGY STUDY: SIGNIFICANT CHANGE UP

## 2023-08-07 PROCEDURE — 88321 CONSLTJ&REPRT SLD PREP ELSWR: CPT

## 2023-08-09 ENCOUNTER — APPOINTMENT (OUTPATIENT)
Dept: THORACIC SURGERY | Facility: CLINIC | Age: 83
End: 2023-08-09
Payer: MEDICARE

## 2023-08-09 VITALS
OXYGEN SATURATION: 94 % | WEIGHT: 172 LBS | BODY MASS INDEX: 29.37 KG/M2 | SYSTOLIC BLOOD PRESSURE: 138 MMHG | DIASTOLIC BLOOD PRESSURE: 84 MMHG | HEART RATE: 101 BPM | HEIGHT: 64 IN

## 2023-08-09 DIAGNOSIS — R91.1 SOLITARY PULMONARY NODULE: ICD-10-CM

## 2023-08-09 PROCEDURE — 99205 OFFICE O/P NEW HI 60 MIN: CPT

## 2023-08-09 NOTE — ASSESSMENT
[FreeTextEntry1] : Ms. SIMEON CABRAL, 82 year old female, former smoker (10 pack years, quit in 2003), w/ hx of HTN, COPD, CAD s/p CAGB x1 with Dr. ySed and now on ASA, anxiety, pulmonary nodule (known since 2019). Recent imaging revealing bilateral pulmonary nodules, largest in RLL, and lymphadenopathy. Now s/p CT guided lung biopsy at [place] on 05/26/2023. 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   Patient is here today for CT Sx consultation, referred by Dr. Elam (heme/onc).  I have independently reviewed the medical records and imaging at the time of this office consultation. Imagine and path reviewed with patient, revealing RLL positive for adenocarcinoma, which I suspect is stage 1 disease. Surgical approach reviewed with patient. Discussed risks in details, patient is agreeable to proceed. Will schedule for Right VATS, robotic assisted Right Lower lobe wedge resection. I discussed she may need oxygen post-surgery for some time. She will need PFTs, referred to Dr. Delvis Jaime. She will also need PST and cardiac note for clearance, had recent Stress test. She can continue taking aspirin.  Recommendations reviewed with patient during this office visit, and all questions answered; Patient instructed on the importance of follow up and verbalizes understanding.    I, JUAN Jones, personally performed the evaluation and management (E/M) services for this new patient.  That E/M includes conducting the initial examination, assessing all conditions, and establishing the plan of care.  Today, my ACP, Kraig Jimenez NP, was here to observe my evaluation and management services for this patient to be followed going forward.

## 2023-08-09 NOTE — CONSULT LETTER
[Dear  ___] : Dear  [unfilled], [Consult Letter:] : I had the pleasure of evaluating your patient, [unfilled]. [( Thank you for referring [unfilled] for consultation for _____ )] : Thank you for referring [unfilled] for consultation for [unfilled] [Please see my note below.] : Please see my note below. [Consult Closing:] : Thank you very much for allowing me to participate in the care of this patient.  If you have any questions, please do not hesitate to contact me. [Sincerely,] : Sincerely, [FreeTextEntry2] : Dr. Elam (heme/onc/ref) [FreeTextEntry3] : Wilfrid Paz MD, FACS , Division of Thoracic Surgery [facility] Thoracic Surgery [facility] Department of Cardiovascular & Thoracic Surgery  [employer] [facility]

## 2023-08-09 NOTE — HISTORY OF PRESENT ILLNESS
[FreeTextEntry1] : Ms. SIMEON CABRAL, 82 year old female, former smoker (10 pack years, quit in 2003), w/ hx of HTN, COPD, CAD s/p CAGB x1 with Dr. Syed and now on ASA, anxiety, pulmonary nodule (known since 2019). Recent imaging revealing bilateral pulmonary nodules, largest in RLL, and lymphadenopathy. Now s/p CT guided lung biopsy at [place] on 05/26/2023. 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   PET/CT on 10/30/2020:  - Mildly FDG avid bilateral axillary lymph nodes. A reference 1.2 x 0.8 cm left axillary node (SUV 2.4; image 67). A 1.3 x 0.5 cm right axillary node (SUV 1.5; image 71). - Minimally FDG avid part solid nodule within the right apex not significantly changed from prior CT, (SUV 1.4; image 65). - Unchanged 1.6 cm nodular and groundglass opacities in the posterior right upper lobe demonstrates SUV 1.4 (image 70).  - Unchanged 1 cm solid nodule in the right lower lobe demonstrates SUV 2.2 (image 80). - Subcentimeter nodules in the periphery of the right lower lobe (image 87), unchanged from prior CT, is too small to characterize. - Groundglass opacity in the left lower lobe seen on CT from 9/11/2019  CT C/A/P w/IV Contrast on 05/05/2021: - 1.5 x 0.9 cm nodule in the superior segment of the right lower lobe on series 5 image 59  - 1.1 x 0.6 cm in the right upper lobe on series 5 image 38. - Additional scattered smaller nodules and groundglass opacities bilaterally. Additional groundglass and solid-appearing nodules.  CT C/A/P w/IV Contrast on 05/22/2023 ([facility]): - There is a pulmonary nodule measuring 2.4 x 2.4x 3.3 cm in the RLL.  - There are additional smaller pulmonary nodules in the B/L upper lobes - There is an enlarged right hilar LN measuring 1.2 cm in the short axis - There is an enlarged peritracheal LN measuring 1.1 cm in short axis.   PET/CT on 06/19/2023 ([facility]): - FDG avid RLL pulmonary nodule measures 2.4 x 2.1 cm, SUV 16.1 (image 75). Consistent with known malignancy. - Other non-FDG avid DAINA pulmonary nodules measures 0.5 x 0.4 cm (image 61). - RUL pulmonary nodule measures 0.7 x 0.6 cm (image 63). - Right apical scaring - No FDG avid mediastinal, hilar, or axillary lymphadenopathy.  08/01/2023: Seen by Dr. Elam (heme/onc), referred to rad/onc and CT Sx. RTC after RT or after surgery.   Patient is here today for CT Sx consultation, referred by Dr. Elam (heme/onc). Overall, she reports to be feeling well. Denies any chest pain, shortness of breath, cough, or hemoptysis. She reports she recently had Stress Test with Dr. Salgado for noted carotid artery stenosis, normal study.

## 2023-08-09 NOTE — DATA REVIEWED
[FreeTextEntry1] : I have independently reviewed the following: PET/CT on 10/30/2020 CT C/A/P w/IV Contrast on 05/05/2021 CT C/A/P w/IV Contrast on 05/22/2023 Path from 05/26/2023 PET/CT on 06/19/2023

## 2023-08-11 ENCOUNTER — NON-APPOINTMENT (OUTPATIENT)
Age: 83
End: 2023-08-11

## 2023-08-11 ENCOUNTER — APPOINTMENT (OUTPATIENT)
Dept: RADIATION ONCOLOGY | Facility: CLINIC | Age: 83
End: 2023-08-11
Payer: MEDICARE

## 2023-08-11 VITALS
HEIGHT: 64 IN | RESPIRATION RATE: 19 BRPM | BODY MASS INDEX: 29.02 KG/M2 | HEART RATE: 80 BPM | OXYGEN SATURATION: 92 % | WEIGHT: 170 LBS | SYSTOLIC BLOOD PRESSURE: 132 MMHG | DIASTOLIC BLOOD PRESSURE: 80 MMHG

## 2023-08-11 DIAGNOSIS — Z80.1 FAMILY HISTORY OF MALIGNANT NEOPLASM OF TRACHEA, BRONCHUS AND LUNG: ICD-10-CM

## 2023-08-11 PROCEDURE — 99214 OFFICE O/P EST MOD 30 MIN: CPT

## 2023-08-11 NOTE — VITALS
[Maximal Pain Intensity: 8/10] : 8/10 [Least Pain Intensity: 0/10] : 0/10 [Pain Location: ___] : Pain Location: [unfilled] [70: Cares for self; unalbe to carry on normal activity or do active work.] : 70: Cares for self; unable to carry on normal activity or do active work. [Date: ____________] : Patient's last distress assessment performed on [unfilled]. [7 - Distress Level] : Distress Level: 7 [Referred Patient  to social work for follow-up] : Patient was referred to social work for follow-up [Patient given social work contact information and resource sheet] : Patient was given social work contact information and resource sheet

## 2023-08-11 NOTE — DISEASE MANAGEMENT
[Clinical] : TNM Stage: c [IA] : IA [FreeTextEntry4] : adenoca right lung [TTNM] : 1c [NTNM] : 0 [MTNM] : 0

## 2023-08-11 NOTE — HISTORY OF PRESENT ILLNESS
[FreeTextEntry1] : This is an 82 year old female diagnosed with lung cancer in May 2023 referred by Dr. Elam.   She presented to [facility] in May 2023 s/p fall.   Inpatient CT A/P performed on 5/22/23 showed bilateral pulmonary nodules with the largest measuring 2.4 x 2.4 x 3.3 cm in the right lower lung. Right hilar lymphadenopathy and mediastinal lymphadenopathy concerning for primary pulmonary malignancy.   Brain MRI was negative as per note.   CT guided lung biopsy of right lung nodule performed on 5/26/23 showed adenocarcinoma with cribriform and acinar patterns.   PET scan performed on 6/19/23 showed FDG avid right pulmonary nodule, measuring 2.4 x 2.1 cm, consistent with known malignancy. Bilateral upper lobe pulmonary nodules below resolution of PET. No distant mets.   She had a consultation with [facility] and was recommended to undergo Cyberknife treatment.   She had a consultation with Dr. Elam on 8/1/23 who has referred her for consderation of SBRT.   She had a consultation with Dr. Paz on 8/9/23 who offered a right VATS, robotic assisted right lower lobe wedge resection.  Patietn is unsure, however, if she wants to proceed with surgery.  She presents to discuss the role of radiation therapy in her care. She reports SOB on exertion, occasional caught, denies productive cough.  Her main complaints are of back pain and bilateral knee pain from DJD.

## 2023-08-11 NOTE — DATA REVIEWED
[FreeTextEntry1] : PET/CT on 10/30/2020: - Mildly FDG avid bilateral axillary lymph nodes. A reference 1.2 x 0.8 cm left axillary node (SUV 2.4; image 67). A 1.3 x 0.5 cm right axillary node (SUV 1.5; image 71). - Minimally FDG avid part solid nodule within the right apex not significantly changed from prior CT, (SUV 1.4; image 65). - Unchanged 1.6 cm nodular and groundglass opacities in the posterior right upper lobe demonstrates SUV 1.4 (image 70). - Unchanged 1 cm solid nodule in the right lower lobe demonstrates SUV 2.2 (image 80). - Subcentimeter nodules in the periphery of the right lower lobe (image 87), unchanged from prior CT, is too small to characterize. - Groundglass opacity in the left lower lobe seen on CT from 9/11/2019  CT C/A/P w/IV Contrast on 05/05/2021: - 1.5 x 0.9 cm nodule in the superior segment of the right lower lobe on series 5 image 59 - 1.1 x 0.6 cm in the right upper lobe on series 5 image 38. - Additional scattered smaller nodules and groundglass opacities bilaterally. Additional groundglass and solid-appearing nodules.  CT C/A/P w/IV Contrast on 05/22/2023 ([facility]): - There is a pulmonary nodule measuring 2.4 x 2.4x 3.3 cm in the RLL. - There are additional smaller pulmonary nodules in the B/L upper lobes - There is an enlarged right hilar LN measuring 1.2 cm in the short axis - There is an enlarged peritracheal LN measuring 1.1 cm in short axis.  PET/CT on 06/19/2023 ([facility]): - FDG avid RLL pulmonary nodule measures 2.4 x 2.1 cm, SUV 16.1 (image 75). Consistent with known malignancy. - Other non-FDG avid DAINA pulmonary nodules measures 0.5 x 0.4 cm (image 61). - RUL pulmonary nodule measures 0.7 x 0.6 cm (image 63). - Right apical scaring - No FDG avid mediastinal, hilar, or axillary lymphadenopathy.

## 2023-08-11 NOTE — REVIEW OF SYSTEMS
[Joint Pain] : joint pain [Negative] : Allergic/Immunologic [FreeTextEntry9] : knees pain, back pain

## 2023-08-11 NOTE — PHYSICAL EXAM
[Normal] : oriented to person, place and time, the affect was normal, the mood was normal and not anxious [de-identified] : Walks with a cane due to knee pain.

## 2023-08-17 PROCEDURE — 77263 THER RADIOLOGY TX PLNG CPLX: CPT

## 2023-08-17 PROCEDURE — 77290 THER RAD SIMULAJ FIELD CPLX: CPT

## 2023-08-17 PROCEDURE — 77333 RADIATION TREATMENT AID(S): CPT

## 2023-08-18 ENCOUNTER — NON-APPOINTMENT (OUTPATIENT)
Age: 83
End: 2023-08-18

## 2023-08-25 PROCEDURE — 77295 3-D RADIOTHERAPY PLAN: CPT

## 2023-08-25 PROCEDURE — 77293 RESPIRATOR MOTION MGMT SIMUL: CPT

## 2023-08-25 PROCEDURE — 77300 RADIATION THERAPY DOSE PLAN: CPT

## 2023-08-25 PROCEDURE — 77334 RADIATION TREATMENT AID(S): CPT

## 2023-08-31 ENCOUNTER — APPOINTMENT (OUTPATIENT)
Dept: ORTHOPEDIC SURGERY | Facility: CLINIC | Age: 83
End: 2023-08-31
Payer: MEDICARE

## 2023-08-31 VITALS
BODY MASS INDEX: 29.02 KG/M2 | WEIGHT: 170 LBS | HEIGHT: 64 IN | DIASTOLIC BLOOD PRESSURE: 70 MMHG | HEART RATE: 78 BPM | OXYGEN SATURATION: 99 % | SYSTOLIC BLOOD PRESSURE: 132 MMHG

## 2023-08-31 PROCEDURE — 20610 DRAIN/INJ JOINT/BURSA W/O US: CPT | Mod: 50

## 2023-08-31 NOTE — PROCEDURE
[de-identified] : 8/31/23: Bilateral knee Injection -Euflexxa The risks, benefits, and alternatives of the injection were reviewed/discussed with the patient today in office and all of their questions were answered.  We discussed possible side effects and efficacy of this injection.  The patient consented to the procedure. Site and side were verified with the patient.  The inferolateral injection site was prepped with chloroprep.  Cold spray was utilized to numb the skin. Utilizing sterile technique, the knee was injected with hyaluronic acid. A sterile bandage was applied. The patient tolerated the procedure well and there were no complications. Lot: [id number] Exp: 9-2-24

## 2023-09-06 ENCOUNTER — OUTPATIENT (OUTPATIENT)
Dept: OUTPATIENT SERVICES | Facility: HOSPITAL | Age: 83
LOS: 1 days | End: 2023-09-06
Payer: MEDICARE

## 2023-09-06 ENCOUNTER — APPOINTMENT (OUTPATIENT)
Dept: CT IMAGING | Facility: CLINIC | Age: 83
End: 2023-09-06
Payer: MEDICARE

## 2023-09-06 DIAGNOSIS — K62.89 OTHER SPECIFIED DISEASES OF ANUS AND RECTUM: Chronic | ICD-10-CM

## 2023-09-06 DIAGNOSIS — Z98.49 CATARACT EXTRACTION STATUS, UNSPECIFIED EYE: Chronic | ICD-10-CM

## 2023-09-06 DIAGNOSIS — I65.23 OCCLUSION AND STENOSIS OF BILATERAL CAROTID ARTERIES: ICD-10-CM

## 2023-09-06 DIAGNOSIS — Z95.1 PRESENCE OF AORTOCORONARY BYPASS GRAFT: Chronic | ICD-10-CM

## 2023-09-06 PROCEDURE — 70496 CT ANGIOGRAPHY HEAD: CPT | Mod: 26

## 2023-09-06 PROCEDURE — 70498 CT ANGIOGRAPHY NECK: CPT | Mod: 26

## 2023-09-06 PROCEDURE — 70498 CT ANGIOGRAPHY NECK: CPT

## 2023-09-06 PROCEDURE — 70496 CT ANGIOGRAPHY HEAD: CPT

## 2023-09-07 ENCOUNTER — APPOINTMENT (OUTPATIENT)
Dept: ORTHOPEDIC SURGERY | Facility: CLINIC | Age: 83
End: 2023-09-07
Payer: MEDICARE

## 2023-09-07 VITALS — WEIGHT: 170 LBS | BODY MASS INDEX: 29.02 KG/M2 | HEIGHT: 64 IN

## 2023-09-07 PROCEDURE — 20610 DRAIN/INJ JOINT/BURSA W/O US: CPT | Mod: 50

## 2023-09-07 PROCEDURE — 20550 NJX 1 TENDON SHEATH/LIGAMENT: CPT | Mod: 50

## 2023-09-07 NOTE — PROCEDURE
[de-identified] : 9/7/23: Bilateral knee Injection -Euflexxa The risks, benefits, and alternatives of the injection were reviewed/discussed with the patient today in office and all of their questions were answered.  We discussed possible side effects and efficacy of this injection.  The patient consented to the procedure. Site and side were verified with the patient.  The inferolateral injection site was prepped with chloroprep.  Cold spray was utilized to numb the skin. Utilizing sterile technique, the knee was injected with hyaluronic acid. A sterile bandage was applied. The patient tolerated the procedure well and there were no complications. Lot: [id number] Exp: 9-2-24 8/31/23: Bilateral knee Injection -Euflexxa

## 2023-09-07 NOTE — PROCEDURE
[de-identified] : 9/7/23: Bilateral knee Injection -Euflexxa The risks, benefits, and alternatives of the injection were reviewed/discussed with the patient today in office and all of their questions were answered.  We discussed possible side effects and efficacy of this injection.  The patient consented to the procedure. Site and side were verified with the patient.  The inferolateral injection site was prepped with chloroprep.  Cold spray was utilized to numb the skin. Utilizing sterile technique, the knee was injected with hyaluronic acid. A sterile bandage was applied. The patient tolerated the procedure well and there were no complications. Lot: [id number] Exp: 9-2-24 8/31/23: Bilateral knee Injection -Euflexxa

## 2023-09-11 PROCEDURE — 77373 STRTCTC BDY RAD THER TX DLVR: CPT

## 2023-09-13 ENCOUNTER — NON-APPOINTMENT (OUTPATIENT)
Age: 83
End: 2023-09-13

## 2023-09-13 PROCEDURE — 77373 STRTCTC BDY RAD THER TX DLVR: CPT

## 2023-09-14 ENCOUNTER — APPOINTMENT (OUTPATIENT)
Dept: ORTHOPEDIC SURGERY | Facility: CLINIC | Age: 83
End: 2023-09-14
Payer: MEDICARE

## 2023-09-14 PROCEDURE — 20610 DRAIN/INJ JOINT/BURSA W/O US: CPT | Mod: 50

## 2023-09-15 PROCEDURE — 77373 STRTCTC BDY RAD THER TX DLVR: CPT

## 2023-09-18 ENCOUNTER — APPOINTMENT (OUTPATIENT)
Dept: PAIN MANAGEMENT | Facility: CLINIC | Age: 83
End: 2023-09-18
Payer: MEDICARE

## 2023-09-18 VITALS — HEIGHT: 64 IN | BODY MASS INDEX: 29.37 KG/M2 | WEIGHT: 172 LBS

## 2023-09-18 DIAGNOSIS — M43.17 SPONDYLOLISTHESIS, LUMBOSACRAL REGION: ICD-10-CM

## 2023-09-18 DIAGNOSIS — M54.16 RADICULOPATHY, LUMBAR REGION: ICD-10-CM

## 2023-09-18 DIAGNOSIS — Z98.890 OTHER SPECIFIED POSTPROCEDURAL STATES: ICD-10-CM

## 2023-09-18 PROCEDURE — 99214 OFFICE O/P EST MOD 30 MIN: CPT

## 2023-09-19 PROCEDURE — 77373 STRTCTC BDY RAD THER TX DLVR: CPT

## 2023-09-21 PROCEDURE — 77373 STRTCTC BDY RAD THER TX DLVR: CPT

## 2023-09-21 PROCEDURE — 77336 RADIATION PHYSICS CONSULT: CPT

## 2023-09-21 PROCEDURE — 77435 SBRT MANAGEMENT: CPT

## 2023-10-02 ENCOUNTER — APPOINTMENT (OUTPATIENT)
Dept: FAMILY MEDICINE | Facility: CLINIC | Age: 83
End: 2023-10-02
Payer: MEDICARE

## 2023-10-02 VITALS
RESPIRATION RATE: 16 BRPM | TEMPERATURE: 96.4 F | WEIGHT: 172 LBS | SYSTOLIC BLOOD PRESSURE: 118 MMHG | DIASTOLIC BLOOD PRESSURE: 70 MMHG | HEART RATE: 98 BPM | BODY MASS INDEX: 29.37 KG/M2 | HEIGHT: 64 IN | OXYGEN SATURATION: 93 %

## 2023-10-02 DIAGNOSIS — N18.31 CHRONIC KIDNEY DISEASE, STAGE 3A: ICD-10-CM

## 2023-10-02 DIAGNOSIS — M48.062 SPINAL STENOSIS, LUMBAR REGION WITH NEUROGENIC CLAUDICATION: ICD-10-CM

## 2023-10-02 PROCEDURE — 99214 OFFICE O/P EST MOD 30 MIN: CPT

## 2023-10-02 RX ORDER — CELECOXIB 200 MG/1
200 CAPSULE ORAL
Qty: 30 | Refills: 0 | Status: COMPLETED | COMMUNITY
Start: 2021-09-24 | End: 2023-10-02

## 2023-10-06 LAB
ALBUMIN SERPL ELPH-MCNC: 4.5 G/DL
ALP BLD-CCNC: 136 U/L
ALT SERPL-CCNC: 24 U/L
ANION GAP SERPL CALC-SCNC: 15 MMOL/L
AST SERPL-CCNC: 25 U/L
BASOPHILS # BLD AUTO: 0.09 K/UL
BASOPHILS NFR BLD AUTO: 0.9 %
BILIRUB SERPL-MCNC: 0.3 MG/DL
BUN SERPL-MCNC: 20 MG/DL
CALCIUM SERPL-MCNC: 10.2 MG/DL
CHLORIDE SERPL-SCNC: 103 MMOL/L
CO2 SERPL-SCNC: 21 MMOL/L
CREAT SERPL-MCNC: 1.05 MG/DL
EGFR: 54 ML/MIN/1.73M2
EOSINOPHIL # BLD AUTO: 0.32 K/UL
EOSINOPHIL NFR BLD AUTO: 3.3 %
GLUCOSE SERPL-MCNC: 106 MG/DL
HCT VFR BLD CALC: 46.5 %
HGB BLD-MCNC: 14.4 G/DL
IMM GRANULOCYTES NFR BLD AUTO: 0.5 %
LYMPHOCYTES # BLD AUTO: 1.96 K/UL
LYMPHOCYTES NFR BLD AUTO: 20.4 %
MAN DIFF?: NORMAL
MCHC RBC-ENTMCNC: 26.1 PG
MCHC RBC-ENTMCNC: 31 GM/DL
MCV RBC AUTO: 84.4 FL
MONOCYTES # BLD AUTO: 0.72 K/UL
MONOCYTES NFR BLD AUTO: 7.5 %
NEUTROPHILS # BLD AUTO: 6.47 K/UL
NEUTROPHILS NFR BLD AUTO: 67.4 %
PLATELET # BLD AUTO: 306 K/UL
POTASSIUM SERPL-SCNC: 5 MMOL/L
PROT SERPL-MCNC: 7.9 G/DL
RBC # BLD: 5.51 M/UL
RBC # FLD: 14.9 %
SODIUM SERPL-SCNC: 138 MMOL/L
WBC # FLD AUTO: 9.61 K/UL

## 2023-10-16 ENCOUNTER — NON-APPOINTMENT (OUTPATIENT)
Age: 83
End: 2023-10-16

## 2023-10-25 ENCOUNTER — RX RENEWAL (OUTPATIENT)
Age: 83
End: 2023-10-25

## 2023-10-26 ENCOUNTER — APPOINTMENT (OUTPATIENT)
Dept: RADIATION ONCOLOGY | Facility: CLINIC | Age: 83
End: 2023-10-26
Payer: MEDICARE

## 2023-10-26 ENCOUNTER — NON-APPOINTMENT (OUTPATIENT)
Age: 83
End: 2023-10-26

## 2023-10-26 VITALS
WEIGHT: 170 LBS | HEIGHT: 64 IN | DIASTOLIC BLOOD PRESSURE: 86 MMHG | HEART RATE: 88 BPM | SYSTOLIC BLOOD PRESSURE: 132 MMHG | BODY MASS INDEX: 29.02 KG/M2 | OXYGEN SATURATION: 96 % | RESPIRATION RATE: 18 BRPM

## 2023-10-26 PROCEDURE — 99024 POSTOP FOLLOW-UP VISIT: CPT

## 2023-11-02 ENCOUNTER — APPOINTMENT (OUTPATIENT)
Dept: ORTHOPEDIC SURGERY | Facility: CLINIC | Age: 83
End: 2023-11-02
Payer: MEDICARE

## 2023-11-02 ENCOUNTER — APPOINTMENT (OUTPATIENT)
Dept: ORTHOPEDIC SURGERY | Facility: CLINIC | Age: 83
End: 2023-11-02

## 2023-11-02 VITALS
HEART RATE: 82 BPM | DIASTOLIC BLOOD PRESSURE: 72 MMHG | SYSTOLIC BLOOD PRESSURE: 128 MMHG | WEIGHT: 170 LBS | OXYGEN SATURATION: 99 % | HEIGHT: 64 IN | BODY MASS INDEX: 29.02 KG/M2

## 2023-11-02 DIAGNOSIS — M17.0 BILATERAL PRIMARY OSTEOARTHRITIS OF KNEE: ICD-10-CM

## 2023-11-02 PROCEDURE — 73564 X-RAY EXAM KNEE 4 OR MORE: CPT | Mod: 50

## 2023-11-02 PROCEDURE — 99214 OFFICE O/P EST MOD 30 MIN: CPT

## 2023-11-06 ENCOUNTER — APPOINTMENT (OUTPATIENT)
Dept: VASCULAR SURGERY | Facility: CLINIC | Age: 83
End: 2023-11-06
Payer: MEDICARE

## 2023-11-06 VITALS
HEIGHT: 63 IN | HEART RATE: 95 BPM | SYSTOLIC BLOOD PRESSURE: 136 MMHG | OXYGEN SATURATION: 95 % | DIASTOLIC BLOOD PRESSURE: 85 MMHG | BODY MASS INDEX: 30.12 KG/M2 | TEMPERATURE: 97.5 F | WEIGHT: 170 LBS | RESPIRATION RATE: 16 BRPM

## 2023-11-06 VITALS — SYSTOLIC BLOOD PRESSURE: 136 MMHG | DIASTOLIC BLOOD PRESSURE: 84 MMHG

## 2023-11-06 PROCEDURE — 93880 EXTRACRANIAL BILAT STUDY: CPT

## 2023-11-06 PROCEDURE — 99214 OFFICE O/P EST MOD 30 MIN: CPT

## 2023-11-06 PROCEDURE — 99204 OFFICE O/P NEW MOD 45 MIN: CPT

## 2023-11-07 ENCOUNTER — RX RENEWAL (OUTPATIENT)
Age: 83
End: 2023-11-07

## 2023-11-09 ENCOUNTER — APPOINTMENT (OUTPATIENT)
Dept: CARDIOLOGY | Facility: CLINIC | Age: 83
End: 2023-11-09
Payer: MEDICARE

## 2023-11-09 ENCOUNTER — NON-APPOINTMENT (OUTPATIENT)
Age: 83
End: 2023-11-09

## 2023-11-09 VITALS
OXYGEN SATURATION: 93 % | HEIGHT: 63 IN | HEART RATE: 92 BPM | WEIGHT: 170 LBS | DIASTOLIC BLOOD PRESSURE: 62 MMHG | SYSTOLIC BLOOD PRESSURE: 110 MMHG | BODY MASS INDEX: 30.12 KG/M2

## 2023-11-09 DIAGNOSIS — I42.9 CARDIOMYOPATHY, UNSPECIFIED: ICD-10-CM

## 2023-11-09 PROCEDURE — 99214 OFFICE O/P EST MOD 30 MIN: CPT | Mod: 25

## 2023-11-09 PROCEDURE — 93000 ELECTROCARDIOGRAM COMPLETE: CPT

## 2023-11-10 LAB
ALBUMIN SERPL ELPH-MCNC: 4.6 G/DL
ALP BLD-CCNC: 114 U/L
ALT SERPL-CCNC: 43 U/L
ANION GAP SERPL CALC-SCNC: 14 MMOL/L
AST SERPL-CCNC: 29 U/L
BILIRUB SERPL-MCNC: 0.5 MG/DL
BUN SERPL-MCNC: 26 MG/DL
CALCIUM SERPL-MCNC: 10.2 MG/DL
CHLORIDE SERPL-SCNC: 103 MMOL/L
CHOLEST SERPL-MCNC: 177 MG/DL
CO2 SERPL-SCNC: 24 MMOL/L
CREAT SERPL-MCNC: 1.04 MG/DL
EGFR: 54 ML/MIN/1.73M2
ESTIMATED AVERAGE GLUCOSE: 128 MG/DL
FOLATE SERPL-MCNC: 19.4 NG/ML
GLUCOSE SERPL-MCNC: 104 MG/DL
HBA1C MFR BLD HPLC: 6.1 %
HCT VFR BLD CALC: 44.7 %
HDLC SERPL-MCNC: 41 MG/DL
HGB BLD-MCNC: 14.1 G/DL
LDLC SERPL CALC-MCNC: 98 MG/DL
MAGNESIUM SERPL-MCNC: 2 MG/DL
MCHC RBC-ENTMCNC: 26.1 PG
MCHC RBC-ENTMCNC: 31.5 GM/DL
MCV RBC AUTO: 82.6 FL
NONHDLC SERPL-MCNC: 136 MG/DL
PLATELET # BLD AUTO: 302 K/UL
POTASSIUM SERPL-SCNC: 5.2 MMOL/L
PROT SERPL-MCNC: 7.6 G/DL
RBC # BLD: 5.41 M/UL
RBC # FLD: 15.5 %
SODIUM SERPL-SCNC: 140 MMOL/L
TRIGL SERPL-MCNC: 219 MG/DL
TSH SERPL-ACNC: 1.68 UIU/ML
VIT B12 SERPL-MCNC: 489 PG/ML
WBC # FLD AUTO: 7.4 K/UL

## 2023-11-13 ENCOUNTER — RX RENEWAL (OUTPATIENT)
Age: 83
End: 2023-11-13

## 2023-11-14 ENCOUNTER — APPOINTMENT (OUTPATIENT)
Dept: CARDIOLOGY | Facility: CLINIC | Age: 83
End: 2023-11-14
Payer: MEDICARE

## 2023-11-14 PROCEDURE — 93306 TTE W/DOPPLER COMPLETE: CPT

## 2023-11-14 RX ORDER — ATORVASTATIN CALCIUM 40 MG/1
40 TABLET, FILM COATED ORAL
Qty: 90 | Refills: 3 | Status: DISCONTINUED | COMMUNITY
Start: 2017-01-12 | End: 2023-11-14

## 2023-11-16 ENCOUNTER — APPOINTMENT (OUTPATIENT)
Dept: FAMILY MEDICINE | Facility: CLINIC | Age: 83
End: 2023-11-16
Payer: MEDICARE

## 2023-11-16 VITALS
BODY MASS INDEX: 29.02 KG/M2 | HEART RATE: 105 BPM | WEIGHT: 170 LBS | SYSTOLIC BLOOD PRESSURE: 130 MMHG | DIASTOLIC BLOOD PRESSURE: 80 MMHG | TEMPERATURE: 95.1 F | HEIGHT: 64 IN | OXYGEN SATURATION: 94 %

## 2023-11-16 DIAGNOSIS — G47.00 INSOMNIA, UNSPECIFIED: ICD-10-CM

## 2023-11-16 DIAGNOSIS — Z00.00 ENCOUNTER FOR GENERAL ADULT MEDICAL EXAMINATION W/OUT ABNORMAL FINDINGS: ICD-10-CM

## 2023-11-16 DIAGNOSIS — C34.90 MALIGNANT NEOPLASM OF UNSPECIFIED PART OF UNSPECIFIED BRONCHUS OR LUNG: ICD-10-CM

## 2023-11-16 DIAGNOSIS — F41.9 ANXIETY DISORDER, UNSPECIFIED: ICD-10-CM

## 2023-11-16 PROCEDURE — G0439: CPT

## 2023-11-16 PROCEDURE — 99214 OFFICE O/P EST MOD 30 MIN: CPT | Mod: 25

## 2023-11-19 PROBLEM — C34.90 LUNG CANCER: Status: ACTIVE | Noted: 2023-06-09

## 2023-11-19 RX ORDER — HYDROXYZINE HYDROCHLORIDE 25 MG/1
25 TABLET ORAL
Qty: 90 | Refills: 0 | Status: ACTIVE | COMMUNITY
Start: 2023-11-16 | End: 1900-01-01

## 2023-11-22 ENCOUNTER — OUTPATIENT (OUTPATIENT)
Dept: OUTPATIENT SERVICES | Facility: HOSPITAL | Age: 83
LOS: 1 days | End: 2023-11-22
Payer: MEDICARE

## 2023-11-22 VITALS
HEART RATE: 94 BPM | RESPIRATION RATE: 16 BRPM | SYSTOLIC BLOOD PRESSURE: 160 MMHG | TEMPERATURE: 97 F | HEIGHT: 64 IN | DIASTOLIC BLOOD PRESSURE: 82 MMHG | WEIGHT: 174.17 LBS | OXYGEN SATURATION: 96 %

## 2023-11-22 DIAGNOSIS — Z95.1 PRESENCE OF AORTOCORONARY BYPASS GRAFT: Chronic | ICD-10-CM

## 2023-11-22 DIAGNOSIS — Z01.818 ENCOUNTER FOR OTHER PREPROCEDURAL EXAMINATION: ICD-10-CM

## 2023-11-22 DIAGNOSIS — G47.33 OBSTRUCTIVE SLEEP APNEA (ADULT) (PEDIATRIC): ICD-10-CM

## 2023-11-22 DIAGNOSIS — Z98.49 CATARACT EXTRACTION STATUS, UNSPECIFIED EYE: Chronic | ICD-10-CM

## 2023-11-22 DIAGNOSIS — Z86.79 PERSONAL HISTORY OF OTHER DISEASES OF THE CIRCULATORY SYSTEM: ICD-10-CM

## 2023-11-22 DIAGNOSIS — Z29.9 ENCOUNTER FOR PROPHYLACTIC MEASURES, UNSPECIFIED: ICD-10-CM

## 2023-11-22 DIAGNOSIS — I10 ESSENTIAL (PRIMARY) HYPERTENSION: ICD-10-CM

## 2023-11-22 DIAGNOSIS — K62.89 OTHER SPECIFIED DISEASES OF ANUS AND RECTUM: Chronic | ICD-10-CM

## 2023-11-22 LAB
A1C WITH ESTIMATED AVERAGE GLUCOSE RESULT: 5.8 % — HIGH (ref 4–5.6)
A1C WITH ESTIMATED AVERAGE GLUCOSE RESULT: 5.8 % — HIGH (ref 4–5.6)
ALBUMIN SERPL ELPH-MCNC: 4.2 G/DL — SIGNIFICANT CHANGE UP (ref 3.3–5.2)
ALBUMIN SERPL ELPH-MCNC: 4.2 G/DL — SIGNIFICANT CHANGE UP (ref 3.3–5.2)
ALP SERPL-CCNC: 119 U/L — SIGNIFICANT CHANGE UP (ref 40–120)
ALP SERPL-CCNC: 119 U/L — SIGNIFICANT CHANGE UP (ref 40–120)
ALT FLD-CCNC: 17 U/L — SIGNIFICANT CHANGE UP
ALT FLD-CCNC: 17 U/L — SIGNIFICANT CHANGE UP
ANION GAP SERPL CALC-SCNC: 11 MMOL/L — SIGNIFICANT CHANGE UP (ref 5–17)
ANION GAP SERPL CALC-SCNC: 11 MMOL/L — SIGNIFICANT CHANGE UP (ref 5–17)
APTT BLD: 29.9 SEC — SIGNIFICANT CHANGE UP (ref 24.5–35.6)
APTT BLD: 29.9 SEC — SIGNIFICANT CHANGE UP (ref 24.5–35.6)
AST SERPL-CCNC: 17 U/L — SIGNIFICANT CHANGE UP
AST SERPL-CCNC: 17 U/L — SIGNIFICANT CHANGE UP
BASOPHILS # BLD AUTO: 0.06 K/UL — SIGNIFICANT CHANGE UP (ref 0–0.2)
BASOPHILS # BLD AUTO: 0.06 K/UL — SIGNIFICANT CHANGE UP (ref 0–0.2)
BASOPHILS NFR BLD AUTO: 0.8 % — SIGNIFICANT CHANGE UP (ref 0–2)
BASOPHILS NFR BLD AUTO: 0.8 % — SIGNIFICANT CHANGE UP (ref 0–2)
BILIRUB SERPL-MCNC: 0.3 MG/DL — LOW (ref 0.4–2)
BILIRUB SERPL-MCNC: 0.3 MG/DL — LOW (ref 0.4–2)
BLD GP AB SCN SERPL QL: SIGNIFICANT CHANGE UP
BLD GP AB SCN SERPL QL: SIGNIFICANT CHANGE UP
BUN SERPL-MCNC: 21.2 MG/DL — HIGH (ref 8–20)
BUN SERPL-MCNC: 21.2 MG/DL — HIGH (ref 8–20)
CALCIUM SERPL-MCNC: 9.8 MG/DL — SIGNIFICANT CHANGE UP (ref 8.4–10.5)
CALCIUM SERPL-MCNC: 9.8 MG/DL — SIGNIFICANT CHANGE UP (ref 8.4–10.5)
CHLORIDE SERPL-SCNC: 101 MMOL/L — SIGNIFICANT CHANGE UP (ref 96–108)
CHLORIDE SERPL-SCNC: 101 MMOL/L — SIGNIFICANT CHANGE UP (ref 96–108)
CO2 SERPL-SCNC: 25 MMOL/L — SIGNIFICANT CHANGE UP (ref 22–29)
CO2 SERPL-SCNC: 25 MMOL/L — SIGNIFICANT CHANGE UP (ref 22–29)
CREAT SERPL-MCNC: 0.94 MG/DL — SIGNIFICANT CHANGE UP (ref 0.5–1.3)
CREAT SERPL-MCNC: 0.94 MG/DL — SIGNIFICANT CHANGE UP (ref 0.5–1.3)
EGFR: 61 ML/MIN/1.73M2 — SIGNIFICANT CHANGE UP
EGFR: 61 ML/MIN/1.73M2 — SIGNIFICANT CHANGE UP
EOSINOPHIL # BLD AUTO: 0.57 K/UL — HIGH (ref 0–0.5)
EOSINOPHIL # BLD AUTO: 0.57 K/UL — HIGH (ref 0–0.5)
EOSINOPHIL NFR BLD AUTO: 7.9 % — HIGH (ref 0–6)
EOSINOPHIL NFR BLD AUTO: 7.9 % — HIGH (ref 0–6)
ESTIMATED AVERAGE GLUCOSE: 120 MG/DL — HIGH (ref 68–114)
ESTIMATED AVERAGE GLUCOSE: 120 MG/DL — HIGH (ref 68–114)
GLUCOSE SERPL-MCNC: 107 MG/DL — HIGH (ref 70–99)
GLUCOSE SERPL-MCNC: 107 MG/DL — HIGH (ref 70–99)
HCT VFR BLD CALC: 42.9 % — SIGNIFICANT CHANGE UP (ref 34.5–45)
HCT VFR BLD CALC: 42.9 % — SIGNIFICANT CHANGE UP (ref 34.5–45)
HGB BLD-MCNC: 13.5 G/DL — SIGNIFICANT CHANGE UP (ref 11.5–15.5)
HGB BLD-MCNC: 13.5 G/DL — SIGNIFICANT CHANGE UP (ref 11.5–15.5)
IMM GRANULOCYTES NFR BLD AUTO: 0.3 % — SIGNIFICANT CHANGE UP (ref 0–0.9)
IMM GRANULOCYTES NFR BLD AUTO: 0.3 % — SIGNIFICANT CHANGE UP (ref 0–0.9)
INR BLD: 1.01 RATIO — SIGNIFICANT CHANGE UP (ref 0.85–1.18)
INR BLD: 1.01 RATIO — SIGNIFICANT CHANGE UP (ref 0.85–1.18)
LYMPHOCYTES # BLD AUTO: 1.78 K/UL — SIGNIFICANT CHANGE UP (ref 1–3.3)
LYMPHOCYTES # BLD AUTO: 1.78 K/UL — SIGNIFICANT CHANGE UP (ref 1–3.3)
LYMPHOCYTES # BLD AUTO: 24.8 % — SIGNIFICANT CHANGE UP (ref 13–44)
LYMPHOCYTES # BLD AUTO: 24.8 % — SIGNIFICANT CHANGE UP (ref 13–44)
MCHC RBC-ENTMCNC: 26.2 PG — LOW (ref 27–34)
MCHC RBC-ENTMCNC: 26.2 PG — LOW (ref 27–34)
MCHC RBC-ENTMCNC: 31.5 GM/DL — LOW (ref 32–36)
MCHC RBC-ENTMCNC: 31.5 GM/DL — LOW (ref 32–36)
MCV RBC AUTO: 83.1 FL — SIGNIFICANT CHANGE UP (ref 80–100)
MCV RBC AUTO: 83.1 FL — SIGNIFICANT CHANGE UP (ref 80–100)
MONOCYTES # BLD AUTO: 0.61 K/UL — SIGNIFICANT CHANGE UP (ref 0–0.9)
MONOCYTES # BLD AUTO: 0.61 K/UL — SIGNIFICANT CHANGE UP (ref 0–0.9)
MONOCYTES NFR BLD AUTO: 8.5 % — SIGNIFICANT CHANGE UP (ref 2–14)
MONOCYTES NFR BLD AUTO: 8.5 % — SIGNIFICANT CHANGE UP (ref 2–14)
NEUTROPHILS # BLD AUTO: 4.15 K/UL — SIGNIFICANT CHANGE UP (ref 1.8–7.4)
NEUTROPHILS # BLD AUTO: 4.15 K/UL — SIGNIFICANT CHANGE UP (ref 1.8–7.4)
NEUTROPHILS NFR BLD AUTO: 57.7 % — SIGNIFICANT CHANGE UP (ref 43–77)
NEUTROPHILS NFR BLD AUTO: 57.7 % — SIGNIFICANT CHANGE UP (ref 43–77)
PLATELET # BLD AUTO: 262 K/UL — SIGNIFICANT CHANGE UP (ref 150–400)
PLATELET # BLD AUTO: 262 K/UL — SIGNIFICANT CHANGE UP (ref 150–400)
POTASSIUM SERPL-MCNC: 5.4 MMOL/L — HIGH (ref 3.5–5.3)
POTASSIUM SERPL-MCNC: 5.4 MMOL/L — HIGH (ref 3.5–5.3)
POTASSIUM SERPL-SCNC: 5.4 MMOL/L — HIGH (ref 3.5–5.3)
POTASSIUM SERPL-SCNC: 5.4 MMOL/L — HIGH (ref 3.5–5.3)
PROT SERPL-MCNC: 7.4 G/DL — SIGNIFICANT CHANGE UP (ref 6.6–8.7)
PROT SERPL-MCNC: 7.4 G/DL — SIGNIFICANT CHANGE UP (ref 6.6–8.7)
PROTHROM AB SERPL-ACNC: 11.2 SEC — SIGNIFICANT CHANGE UP (ref 9.5–13)
PROTHROM AB SERPL-ACNC: 11.2 SEC — SIGNIFICANT CHANGE UP (ref 9.5–13)
RBC # BLD: 5.16 M/UL — SIGNIFICANT CHANGE UP (ref 3.8–5.2)
RBC # BLD: 5.16 M/UL — SIGNIFICANT CHANGE UP (ref 3.8–5.2)
RBC # FLD: 14.1 % — SIGNIFICANT CHANGE UP (ref 10.3–14.5)
RBC # FLD: 14.1 % — SIGNIFICANT CHANGE UP (ref 10.3–14.5)
SODIUM SERPL-SCNC: 137 MMOL/L — SIGNIFICANT CHANGE UP (ref 135–145)
SODIUM SERPL-SCNC: 137 MMOL/L — SIGNIFICANT CHANGE UP (ref 135–145)
WBC # BLD: 7.19 K/UL — SIGNIFICANT CHANGE UP (ref 3.8–10.5)
WBC # BLD: 7.19 K/UL — SIGNIFICANT CHANGE UP (ref 3.8–10.5)
WBC # FLD AUTO: 7.19 K/UL — SIGNIFICANT CHANGE UP (ref 3.8–10.5)
WBC # FLD AUTO: 7.19 K/UL — SIGNIFICANT CHANGE UP (ref 3.8–10.5)

## 2023-11-22 RX ORDER — CEFAZOLIN SODIUM 1 G
2000 VIAL (EA) INJECTION ONCE
Refills: 0 | Status: DISCONTINUED | OUTPATIENT
Start: 2023-11-22 | End: 2023-11-22

## 2023-11-22 RX ORDER — SODIUM CHLORIDE 9 MG/ML
3 INJECTION INTRAMUSCULAR; INTRAVENOUS; SUBCUTANEOUS EVERY 8 HOURS
Refills: 0 | Status: DISCONTINUED | OUTPATIENT
Start: 2023-11-22 | End: 2023-11-22

## 2023-11-22 NOTE — H&P PST ADULT - NSICDXPASTMEDICALHX_GEN_ALL_CORE_FT
PAST MEDICAL HISTORY:  Angina pectoris     Anxiety     CAD (coronary artery disease)     Chronic back pain     History of COPD     Hyperlipidemia     Hypertension     Lumbar stenosis     STU (obstructive sleep apnea)     Rectal mass      PAST MEDICAL HISTORY:  Angina pectoris     Anxiety     CAD (coronary artery disease)     Chronic back pain     H/O carotid stenosis     History of COPD     Hyperlipidemia     Hypertension     Lumbar stenosis     STU (obstructive sleep apnea)     Rectal mass

## 2023-11-22 NOTE — H&P PST ADULT - NSANTHOSAYNRD_GEN_A_CORE
No. STU screening performed.  STOP BANG Legend: 0-2 = LOW Risk; 3-4 = INTERMEDIATE Risk; 5-8 = HIGH Risk Yes

## 2023-11-22 NOTE — H&P PST ADULT - SPO2 (%)
Topical Clindamycin Pregnancy And Lactation Text: This medication is Pregnancy Category B and is considered safe during pregnancy. It is unknown if it is excreted in breast milk. 96

## 2023-11-22 NOTE — H&P PST ADULT - ASSESSMENT
CAPRINI SCORE    AGE RELATED RISK FACTORS                                                             [ ] Age 41-60 years                                            (1 Point)  [ ] Age: 61-74 years                                           (2 Points)                 [ ] Age= 75 years                                                (3 Points)             DISEASE RELATED RISK FACTORS                                                       [ ] Edema in the lower extremities                 (1 Point)                     [ ] Varicose veins                                               (1 Point)                                 [ ] BMI > 25 Kg/m2                                            (1 Point)                                  [ ] Serious infection (ie PNA)                            (1 Point)                     [ ] Lung disease ( COPD, Emphysema)            (1 Point)                                                                          [ ] Acute myocardial infarction                         (1 Point)                  [ ] Congestive heart failure (in the previous month)  (1 Point)         [ ] Inflammatory bowel disease                            (1 Point)                  [ ] Central venous access, PICC or Port               (2 points)       (within the last month)                                                                [ ] Stroke (in the previous month)                        (5 Points)    [ ] Previous or present malignancy                       (2 points)                                                                                                                                                         HEMATOLOGY RELATED FACTORS                                                         [ ] Prior episodes of VTE                                     (3 Points)                     [ ] Positive family history for VTE                      (3 Points)                  [ ] Prothrombin 71369 A                                     (3 Points)                     [ ] Factor V Leiden                                                (3 Points)                        [ ] Lupus anticoagulants                                      (3 Points)                                                           [ ] Anticardiolipin antibodies                              (3 Points)                                                       [ ] High homocysteine in the blood                   (3 Points)                                             [ ] Other congenital or acquired thrombophilia      (3 Points)                                                [ ] Heparin induced thrombocytopenia                  (3 Points)                                        MOBILITY RELATED FACTORS  [ ] Bed rest                                                         (1 Point)  [ ] Plaster cast                                                    (2 points)  [ ] Bed bound for more than 72 hours           (2 Points)    GENDER SPECIFIC FACTORS  [ ] Pregnancy or had a baby within the last month   (1 Point)  [ ] Post-partum < 6 weeks                                   (1 Point)  [ ] Hormonal therapy  or oral contraception   (1 Point)  [ ] History of pregnancy complications              (1 point)  [ ] Unexplained or recurrent              (1 Point)    OTHER RISK FACTORS                                           (1 Point)  [ ] BMI >40, smoking, diabetes requiring insulin, chemotherapy  blood transfusions and length of surgery over 2 hours    SURGERY RELATED RISK FACTORS  [ ]  Section within the last month     (1 Point)  [ ] Minor surgery                                                  (1 Point)  [ ] Arthroscopic surgery                                       (2 Points)  [ ] Planned major surgery lasting more            (2 Points)      than 45 minutes     [ ] Elective hip or knee joint replacement       (5 points)       surgery                                                TRAUMA RELATED RISK FACTORS  [ ] Fracture of the hip, pelvis, or leg                       (5 Points)  [ ] Spinal cord injury resulting in paralysis             (5 points)       (in the previous month)    [ ] Paralysis  (less than 1 month)                             (5 Points)  [ ] Multiple Trauma within 1 month                        (5 Points)    Total Score [        ]    Caprini Score 0-2: Low Risk, NO VTE prophylaxis required for most patients, encourage ambulation  Caprini Score 3-6: Moderate Risk , pharmacologic VTE prophylaxis is indicated for most patients (in the absence of contraindications)  Caprini Score Greater than or =7: High risk, pharmocologic VTE prophylaxis indicated for most patients (in the absence of contraindications)    OPIOID RISK TOOL    DAVID EACH BOX THAT APPLIES AND ADD TOTALS AT THE END    FAMILY HISTORY OF SUBSTANCE ABUSE                 FEMALE         MALE                                                Alcohol                             [  ]1 pt          [  ]3pts                                               Illegal Durgs                     [  ]2 pts        [  ]3pts                                               Rx Drugs                           [  ]4 pts        [  ]4 pts    PERSONAL HISTORY OF SUBSTANCE ABUSE                                                                                          Alcohol                             [  ]3 pts       [  ]3 pts                                               Illegal Drugs                     [  ]4 pts        [  ]4 pts                                               Rx Drugs                           [  ]5 pts        [  ]5 pts    AGE BETWEEN 16-45 YEARS                                      [  ]1 pt         [  ]1 pt    HISTORY OF PREADOLESCENT   SEXUAL ABUSE                                                             [  ]3 pts        [  ]0pts    PSYCHOLOGICAL DISEASE                     ADD, OCD, Bipolar, Schizophrenia        [  ]2 pts         [  ]2 pts                      Depression                                               [  ]1 pt           [  ]1 pt           SCORING TOTAL   0)                                     A score of 3 or lower indicated LOW risk for future opioid abuse  A score of 4 to 7 indicated moderate risk for future opioid abuse  A score of 8 or higher indicates a high risk for opioid abuse                                   CAPRINI SCORE    AGE RELATED RISK FACTORS                                                             [ ] Age 41-60 years                                            (1 Point)  [ ] Age: 61-74 years                                           (2 Points)                 [ ] Age= 75 years                                                (3 Points)             DISEASE RELATED RISK FACTORS                                                       [ ] Edema in the lower extremities                 (1 Point)                     [ ] Varicose veins                                               (1 Point)                                 [ ] BMI > 25 Kg/m2                                            (1 Point)                                  [ ] Serious infection (ie PNA)                            (1 Point)                     [ ] Lung disease ( COPD, Emphysema)            (1 Point)                                                                          [ ] Acute myocardial infarction                         (1 Point)                  [ ] Congestive heart failure (in the previous month)  (1 Point)         [ ] Inflammatory bowel disease                            (1 Point)                  [ ] Central venous access, PICC or Port               (2 points)       (within the last month)                                                                [ ] Stroke (in the previous month)                        (5 Points)    [ ] Previous or present malignancy                       (2 points)                                                                                                                                                         HEMATOLOGY RELATED FACTORS                                                         [ ] Prior episodes of VTE                                     (3 Points)                     [ ] Positive family history for VTE                      (3 Points)                  [ ] Prothrombin 93008 A                                     (3 Points)                     [ ] Factor V Leiden                                                (3 Points)                        [ ] Lupus anticoagulants                                      (3 Points)                                                           [ ] Anticardiolipin antibodies                              (3 Points)                                                       [ ] High homocysteine in the blood                   (3 Points)                                             [ ] Other congenital or acquired thrombophilia      (3 Points)                                                [ ] Heparin induced thrombocytopenia                  (3 Points)                                        MOBILITY RELATED FACTORS  [ ] Bed rest                                                         (1 Point)  [ ] Plaster cast                                                    (2 points)  [ ] Bed bound for more than 72 hours           (2 Points)    GENDER SPECIFIC FACTORS  [ ] Pregnancy or had a baby within the last month   (1 Point)  [ ] Post-partum < 6 weeks                                   (1 Point)  [ ] Hormonal therapy  or oral contraception   (1 Point)  [ ] History of pregnancy complications              (1 point)  [ ] Unexplained or recurrent              (1 Point)    OTHER RISK FACTORS                                           (1 Point)  [ ] BMI >40, smoking, diabetes requiring insulin, chemotherapy  blood transfusions and length of surgery over 2 hours    SURGERY RELATED RISK FACTORS  [ ]  Section within the last month     (1 Point)  [ ] Minor surgery                                                  (1 Point)  [ ] Arthroscopic surgery                                       (2 Points)  [ ] Planned major surgery lasting more            (2 Points)      than 45 minutes     [ ] Elective hip or knee joint replacement       (5 points)       surgery                                                TRAUMA RELATED RISK FACTORS  [ ] Fracture of the hip, pelvis, or leg                       (5 Points)  [ ] Spinal cord injury resulting in paralysis             (5 points)       (in the previous month)    [ ] Paralysis  (less than 1 month)                             (5 Points)  [ ] Multiple Trauma within 1 month                        (5 Points)    Total Score [        ]    Caprini Score 0-2: Low Risk, NO VTE prophylaxis required for most patients, encourage ambulation  Caprini Score 3-6: Moderate Risk , pharmacologic VTE prophylaxis is indicated for most patients (in the absence of contraindications)  Caprini Score Greater than or =7: High risk, pharmocologic VTE prophylaxis indicated for most patients (in the absence of contraindications)    OPIOID RISK TOOL    DAVID EACH BOX THAT APPLIES AND ADD TOTALS AT THE END    FAMILY HISTORY OF SUBSTANCE ABUSE                 FEMALE         MALE                                                Alcohol                             [  ]1 pt          [  ]3pts                                               Illegal Durgs                     [  ]2 pts        [  ]3pts                                               Rx Drugs                           [  ]4 pts        [  ]4 pts    PERSONAL HISTORY OF SUBSTANCE ABUSE                                                                                          Alcohol                             [  ]3 pts       [  ]3 pts                                               Illegal Drugs                     [  ]4 pts        [  ]4 pts                                               Rx Drugs                           [  ]5 pts        [  ]5 pts    AGE BETWEEN 16-45 YEARS                                      [  ]1 pt         [  ]1 pt    HISTORY OF PREADOLESCENT   SEXUAL ABUSE                                                             [  ]3 pts        [  ]0pts    PSYCHOLOGICAL DISEASE                     ADD, OCD, Bipolar, Schizophrenia        [  ]2 pts         [  ]2 pts                      Depression                                               [  ]1 pt           [  ]1 pt           SCORING TOTAL   0)                                     A score of 3 or lower indicated LOW risk for future opioid abuse  A score of 4 to 7 indicated moderate risk for future opioid abuse  A score of 8 or higher indicates a high risk for opioid abuse                                   CAPRINI SCORE    AGE RELATED RISK FACTORS                                                             [ ] Age 41-60 years                                            (1 Point)  [ ] Age: 61-74 years                                           (2 Points)                 [ ] Age= 75 years                                                (3 Points)             DISEASE RELATED RISK FACTORS                                                       [ ] Edema in the lower extremities                 (1 Point)                     [ ] Varicose veins                                               (1 Point)                                 [ ] BMI > 25 Kg/m2                                            (1 Point)                                  [ ] Serious infection (ie PNA)                            (1 Point)                     [ ] Lung disease ( COPD, Emphysema)            (1 Point)                                                                          [ ] Acute myocardial infarction                         (1 Point)                  [ ] Congestive heart failure (in the previous month)  (1 Point)         [ ] Inflammatory bowel disease                            (1 Point)                  [ ] Central venous access, PICC or Port               (2 points)       (within the last month)                                                                [ ] Stroke (in the previous month)                        (5 Points)    [ ] Previous or present malignancy                       (2 points)                                                                                                                                                         HEMATOLOGY RELATED FACTORS                                                         [ ] Prior episodes of VTE                                     (3 Points)                     [ ] Positive family history for VTE                      (3 Points)                  [ ] Prothrombin 26997 A                                     (3 Points)                     [ ] Factor V Leiden                                                (3 Points)                        [ ] Lupus anticoagulants                                      (3 Points)                                                           [ ] Anticardiolipin antibodies                              (3 Points)                                                       [ ] High homocysteine in the blood                   (3 Points)                                             [ ] Other congenital or acquired thrombophilia      (3 Points)                                                [ ] Heparin induced thrombocytopenia                  (3 Points)                                        MOBILITY RELATED FACTORS  [ ] Bed rest                                                         (1 Point)  [ ] Plaster cast                                                    (2 points)  [ ] Bed bound for more than 72 hours           (2 Points)    GENDER SPECIFIC FACTORS  [ ] Pregnancy or had a baby within the last month   (1 Point)  [ ] Post-partum < 6 weeks                                   (1 Point)  [ ] Hormonal therapy  or oral contraception   (1 Point)  [ ] History of pregnancy complications              (1 point)  [ ] Unexplained or recurrent              (1 Point)    OTHER RISK FACTORS                                           (1 Point)  [ ] BMI >40, smoking, diabetes requiring insulin, chemotherapy  blood transfusions and length of surgery over 2 hours    SURGERY RELATED RISK FACTORS  [ ]  Section within the last month     (1 Point)  [ ] Minor surgery                                                  (1 Point)  [ ] Arthroscopic surgery                                       (2 Points)  [ ] Planned major surgery lasting more            (2 Points)      than 45 minutes     [ ] Elective hip or knee joint replacement       (5 points)       surgery                                                TRAUMA RELATED RISK FACTORS  [ ] Fracture of the hip, pelvis, or leg                       (5 Points)  [ ] Spinal cord injury resulting in paralysis             (5 points)       (in the previous month)    [ ] Paralysis  (less than 1 month)                             (5 Points)  [ ] Multiple Trauma within 1 month                        (5 Points)    Total Score [        ]    Caprini Score 0-2: Low Risk, NO VTE prophylaxis required for most patients, encourage ambulation  Caprini Score 3-6: Moderate Risk , pharmacologic VTE prophylaxis is indicated for most patients (in the absence of contraindications)  Caprini Score Greater than or =7: High risk, pharmocologic VTE prophylaxis indicated for most patients (in the absence of contraindications)    OPIOID RISK TOOL    DAVID EACH BOX THAT APPLIES AND ADD TOTALS AT THE END    FAMILY HISTORY OF SUBSTANCE ABUSE                 FEMALE         MALE                                                Alcohol                             [  ]1 pt          [  ]3pts                                               Illegal Durgs                     [  ]2 pts        [  ]3pts                                               Rx Drugs                           [  ]4 pts        [  ]4 pts    PERSONAL HISTORY OF SUBSTANCE ABUSE                                                                                          Alcohol                             [  ]3 pts       [  ]3 pts                                               Illegal Drugs                     [  ]4 pts        [  ]4 pts                                               Rx Drugs                           [  ]5 pts        [  ]5 pts    AGE BETWEEN 16-45 YEARS                                      [  ]1 pt         [  ]1 pt    HISTORY OF PREADOLESCENT   SEXUAL ABUSE                                                             [  ]3 pts        [  ]0pts    PSYCHOLOGICAL DISEASE                     ADD, OCD, Bipolar, Schizophrenia        [  ]2 pts         [  ]2 pts                      Depression                                               [  ]1 pt           [  ]1 pt           SCORING TOTAL   0)                                     A score of 3 or lower indicated LOW risk for future opioid abuse  A score of 4 to 7 indicated moderate risk for future opioid abuse  A score of 8 or higher indicates a high risk for opioid abuse                               Pt is a pleasant 82 y/o female, PMH CAD, CABG X3, HTN, cardiomyopathy, STU, COPD, CKD, lung adenocarcinoma s/p radiation 2023, presents to PST with c/o carotid stenosis.  Jennifer explains going to [facility] for evaluation after a fall at home from "knee buckling" on 2023, incidental finding during hospital stay of 90% L ICA stenosis. Referred to surgeon, completed carotid doppler in office which revealed "70 to 99% stenosis of the proximal left ICA, and less than 50% stenosis of the right ICA. Bilateral antegrade flow of vertebral arteries."  Now scheduled for intervention.  Pt reports feeling generally well, denies visual changes/disturbance, HA, numbness/tingling/weakness, SOB, chest pain.  Denies recent fever/cough/cold, infection or abx use.  Scheduled for Left Transcarotid Artery Revascularization Possible Endarterectomy on 2023 with Dr. Eli, pending medical, cardiac and pulmonology clearances requested by surgeon.     CAPRINI SCORE    AGE RELATED RISK FACTORS                                                             [ ] Age 41-60 years                                            (1 Point)  [ ] Age: 61-74 years                                           (2 Points)                 [X ] Age= 75 years                                                (3 Points)             DISEASE RELATED RISK FACTORS                                                       [ ] Edema in the lower extremities                 (1 Point)                     [ ] Varicose veins                                               (1 Point)                                 [X ] BMI > 25 Kg/m2                                            (1 Point)                                  [ ] Serious infection (ie PNA)                            (1 Point)                     [X ] Lung disease ( COPD, Emphysema)            (1 Point)                                                                          [ ] Acute myocardial infarction                         (1 Point)                  [ ] Congestive heart failure (in the previous month)  (1 Point)         [ ] Inflammatory bowel disease                            (1 Point)                  [ ] Central venous access, PICC or Port               (2 points)       (within the last month)                                                                [ ] Stroke (in the previous month)                        (5 Points)    [X ] Previous or present malignancy                       (2 points)                                                                                                                                                         HEMATOLOGY RELATED FACTORS                                                         [ ] Prior episodes of VTE                                     (3 Points)                     [ ] Positive family history for VTE                      (3 Points)                  [ ] Prothrombin 55445 A                                     (3 Points)                     [ ] Factor V Leiden                                                (3 Points)                        [ ] Lupus anticoagulants                                      (3 Points)                                                           [ ] Anticardiolipin antibodies                              (3 Points)                                                       [ ] High homocysteine in the blood                   (3 Points)                                             [ ] Other congenital or acquired thrombophilia      (3 Points)                                                [ ] Heparin induced thrombocytopenia                  (3 Points)                                        MOBILITY RELATED FACTORS  [ ] Bed rest                                                         (1 Point)  [ ] Plaster cast                                                    (2 points)  [ ] Bed bound for more than 72 hours           (2 Points)    GENDER SPECIFIC FACTORS  [ ] Pregnancy or had a baby within the last month   (1 Point)  [ ] Post-partum < 6 weeks                                   (1 Point)  [ ] Hormonal therapy  or oral contraception   (1 Point)  [ ] History of pregnancy complications              (1 point)  [ ] Unexplained or recurrent              (1 Point)    OTHER RISK FACTORS                                           (1 Point)  [ ] BMI >40, smoking, diabetes requiring insulin, chemotherapy  blood transfusions and length of surgery over 2 hours    SURGERY RELATED RISK FACTORS  [ ]  Section within the last month     (1 Point)  [ ] Minor surgery                                                  (1 Point)  [ ] Arthroscopic surgery                                       (2 Points)  [X ] Planned major surgery lasting more            (2 Points)      than 45 minutes     [ ] Elective hip or knee joint replacement       (5 points)       surgery                                                TRAUMA RELATED RISK FACTORS  [ ] Fracture of the hip, pelvis, or leg                       (5 Points)  [ ] Spinal cord injury resulting in paralysis             (5 points)       (in the previous month)    [ ] Paralysis  (less than 1 month)                             (5 Points)  [ ] Multiple Trauma within 1 month                        (5 Points)    Total Score [ 9       ]    Caprini Score 0-2: Low Risk, NO VTE prophylaxis required for most patients, encourage ambulation  Caprini Score 3-6: Moderate Risk , pharmacologic VTE prophylaxis is indicated for most patients (in the absence of contraindications)  Caprini Score Greater than or =7: High risk, pharmocologic VTE prophylaxis indicated for most patients (in the absence of contraindications)    OPIOID RISK TOOL    DAVID EACH BOX THAT APPLIES AND ADD TOTALS AT THE END    FAMILY HISTORY OF SUBSTANCE ABUSE                 FEMALE         MALE                                                Alcohol                             [  ]1 pt          [  ]3pts                                               Illegal Durgs                     [  ]2 pts        [  ]3pts                                               Rx Drugs                           [  ]4 pts        [  ]4 pts    PERSONAL HISTORY OF SUBSTANCE ABUSE                                                                                          Alcohol                             [  ]3 pts       [  ]3 pts                                               Illegal Drugs                     [  ]4 pts        [  ]4 pts                                               Rx Drugs                           [  ]5 pts        [  ]5 pts    AGE BETWEEN 16-45 YEARS                                      [  ]1 pt         [  ]1 pt    HISTORY OF PREADOLESCENT   SEXUAL ABUSE                                                             [  ]3 pts        [  ]0pts    PSYCHOLOGICAL DISEASE                     ADD, OCD, Bipolar, Schizophrenia        [  ]2 pts         [  ]2 pts                      Depression                                               [  ]1 pt           [  ]1 pt           SCORING TOTAL   0)                                     A score of 3 or lower indicated LOW risk for future opioid abuse  A score of 4 to 7 indicated moderate risk for future opioid abuse  A score of 8 or higher indicates a high risk for opioid abuse                               Pt is a pleasant 82 y/o female, PMH CAD, CABG X3, HTN, cardiomyopathy, STU, COPD, CKD, lung adenocarcinoma s/p radiation 2023, presents to PST with c/o carotid stenosis.  Jennifer explains going to [facility] for evaluation after a fall at home from "knee buckling" on 2023, incidental finding during hospital stay of 90% L ICA stenosis. Referred to surgeon, completed carotid doppler in office which revealed "70 to 99% stenosis of the proximal left ICA, and less than 50% stenosis of the right ICA. Bilateral antegrade flow of vertebral arteries."  Now scheduled for intervention.  Pt reports feeling generally well, denies visual changes/disturbance, HA, numbness/tingling/weakness, SOB, chest pain.  Denies recent fever/cough/cold, infection or abx use.  Scheduled for Left Transcarotid Artery Revascularization Possible Endarterectomy on 2023 with Dr. Eli, pending medical, cardiac and pulmonology clearances requested by surgeon.     CAPRINI SCORE    AGE RELATED RISK FACTORS                                                             [ ] Age 41-60 years                                            (1 Point)  [ ] Age: 61-74 years                                           (2 Points)                 [X ] Age= 75 years                                                (3 Points)             DISEASE RELATED RISK FACTORS                                                       [ ] Edema in the lower extremities                 (1 Point)                     [ ] Varicose veins                                               (1 Point)                                 [X ] BMI > 25 Kg/m2                                            (1 Point)                                  [ ] Serious infection (ie PNA)                            (1 Point)                     [X ] Lung disease ( COPD, Emphysema)            (1 Point)                                                                          [ ] Acute myocardial infarction                         (1 Point)                  [ ] Congestive heart failure (in the previous month)  (1 Point)         [ ] Inflammatory bowel disease                            (1 Point)                  [ ] Central venous access, PICC or Port               (2 points)       (within the last month)                                                                [ ] Stroke (in the previous month)                        (5 Points)    [X ] Previous or present malignancy                       (2 points)                                                                                                                                                         HEMATOLOGY RELATED FACTORS                                                         [ ] Prior episodes of VTE                                     (3 Points)                     [ ] Positive family history for VTE                      (3 Points)                  [ ] Prothrombin 05450 A                                     (3 Points)                     [ ] Factor V Leiden                                                (3 Points)                        [ ] Lupus anticoagulants                                      (3 Points)                                                           [ ] Anticardiolipin antibodies                              (3 Points)                                                       [ ] High homocysteine in the blood                   (3 Points)                                             [ ] Other congenital or acquired thrombophilia      (3 Points)                                                [ ] Heparin induced thrombocytopenia                  (3 Points)                                        MOBILITY RELATED FACTORS  [ ] Bed rest                                                         (1 Point)  [ ] Plaster cast                                                    (2 points)  [ ] Bed bound for more than 72 hours           (2 Points)    GENDER SPECIFIC FACTORS  [ ] Pregnancy or had a baby within the last month   (1 Point)  [ ] Post-partum < 6 weeks                                   (1 Point)  [ ] Hormonal therapy  or oral contraception   (1 Point)  [ ] History of pregnancy complications              (1 point)  [ ] Unexplained or recurrent              (1 Point)    OTHER RISK FACTORS                                           (1 Point)  [ ] BMI >40, smoking, diabetes requiring insulin, chemotherapy  blood transfusions and length of surgery over 2 hours    SURGERY RELATED RISK FACTORS  [ ]  Section within the last month     (1 Point)  [ ] Minor surgery                                                  (1 Point)  [ ] Arthroscopic surgery                                       (2 Points)  [X ] Planned major surgery lasting more            (2 Points)      than 45 minutes     [ ] Elective hip or knee joint replacement       (5 points)       surgery                                                TRAUMA RELATED RISK FACTORS  [ ] Fracture of the hip, pelvis, or leg                       (5 Points)  [ ] Spinal cord injury resulting in paralysis             (5 points)       (in the previous month)    [ ] Paralysis  (less than 1 month)                             (5 Points)  [ ] Multiple Trauma within 1 month                        (5 Points)    Total Score [ 9       ]    Caprini Score 0-2: Low Risk, NO VTE prophylaxis required for most patients, encourage ambulation  Caprini Score 3-6: Moderate Risk , pharmacologic VTE prophylaxis is indicated for most patients (in the absence of contraindications)  Caprini Score Greater than or =7: High risk, pharmocologic VTE prophylaxis indicated for most patients (in the absence of contraindications)    OPIOID RISK TOOL    DAVID EACH BOX THAT APPLIES AND ADD TOTALS AT THE END    FAMILY HISTORY OF SUBSTANCE ABUSE                 FEMALE         MALE                                                Alcohol                             [  ]1 pt          [  ]3pts                                               Illegal Durgs                     [  ]2 pts        [  ]3pts                                               Rx Drugs                           [  ]4 pts        [  ]4 pts    PERSONAL HISTORY OF SUBSTANCE ABUSE                                                                                          Alcohol                             [  ]3 pts       [  ]3 pts                                               Illegal Drugs                     [  ]4 pts        [  ]4 pts                                               Rx Drugs                           [  ]5 pts        [  ]5 pts    AGE BETWEEN 16-45 YEARS                                      [  ]1 pt         [  ]1 pt    HISTORY OF PREADOLESCENT   SEXUAL ABUSE                                                             [  ]3 pts        [  ]0pts    PSYCHOLOGICAL DISEASE                     ADD, OCD, Bipolar, Schizophrenia        [  ]2 pts         [  ]2 pts                      Depression                                               [  ]1 pt           [  ]1 pt           SCORING TOTAL   0)                                     A score of 3 or lower indicated LOW risk for future opioid abuse  A score of 4 to 7 indicated moderate risk for future opioid abuse  A score of 8 or higher indicates a high risk for opioid abuse                               Pt is a pleasant 84 y/o female, PMH CAD, CABG X3, HTN, cardiomyopathy, STU, COPD, CKD, lung adenocarcinoma s/p radiation 2023, presents to PST with c/o carotid stenosis.  Jennifer explains going to [facility] for evaluation after a fall at home from "knee buckling" on 2023, incidental finding during hospital stay of 90% L ICA stenosis. Referred to surgeon, completed carotid doppler in office which revealed "70 to 99% stenosis of the proximal left ICA, and less than 50% stenosis of the right ICA. Bilateral antegrade flow of vertebral arteries."  Now scheduled for intervention.  Pt reports feeling generally well, denies visual changes/disturbance, HA, numbness/tingling/weakness, SOB, chest pain.  Denies recent fever/cough/cold, infection or abx use.  Scheduled for Left Transcarotid Artery Revascularization Possible Endarterectomy on 2023 with Dr. Eli, pending medical, cardiac and pulmonology clearances requested by surgeon.     CAPRINI SCORE    AGE RELATED RISK FACTORS                                                             [ ] Age 41-60 years                                            (1 Point)  [ ] Age: 61-74 years                                           (2 Points)                 [X ] Age= 75 years                                                (3 Points)             DISEASE RELATED RISK FACTORS                                                       [ ] Edema in the lower extremities                 (1 Point)                     [ ] Varicose veins                                               (1 Point)                                 [X ] BMI > 25 Kg/m2                                            (1 Point)                                  [ ] Serious infection (ie PNA)                            (1 Point)                     [X ] Lung disease ( COPD, Emphysema)            (1 Point)                                                                          [ ] Acute myocardial infarction                         (1 Point)                  [ ] Congestive heart failure (in the previous month)  (1 Point)         [ ] Inflammatory bowel disease                            (1 Point)                  [ ] Central venous access, PICC or Port               (2 points)       (within the last month)                                                                [ ] Stroke (in the previous month)                        (5 Points)    [X ] Previous or present malignancy                       (2 points)                                                                                                                                                         HEMATOLOGY RELATED FACTORS                                                         [ ] Prior episodes of VTE                                     (3 Points)                     [ ] Positive family history for VTE                      (3 Points)                  [ ] Prothrombin 36919 A                                     (3 Points)                     [ ] Factor V Leiden                                                (3 Points)                        [ ] Lupus anticoagulants                                      (3 Points)                                                           [ ] Anticardiolipin antibodies                              (3 Points)                                                       [ ] High homocysteine in the blood                   (3 Points)                                             [ ] Other congenital or acquired thrombophilia      (3 Points)                                                [ ] Heparin induced thrombocytopenia                  (3 Points)                                        MOBILITY RELATED FACTORS  [ ] Bed rest                                                         (1 Point)  [ ] Plaster cast                                                    (2 points)  [ ] Bed bound for more than 72 hours           (2 Points)    GENDER SPECIFIC FACTORS  [ ] Pregnancy or had a baby within the last month   (1 Point)  [ ] Post-partum < 6 weeks                                   (1 Point)  [ ] Hormonal therapy  or oral contraception   (1 Point)  [ ] History of pregnancy complications              (1 point)  [ ] Unexplained or recurrent              (1 Point)    OTHER RISK FACTORS                                           (1 Point)  [ ] BMI >40, smoking, diabetes requiring insulin, chemotherapy  blood transfusions and length of surgery over 2 hours    SURGERY RELATED RISK FACTORS  [ ]  Section within the last month     (1 Point)  [ ] Minor surgery                                                  (1 Point)  [ ] Arthroscopic surgery                                       (2 Points)  [X ] Planned major surgery lasting more            (2 Points)      than 45 minutes     [ ] Elective hip or knee joint replacement       (5 points)       surgery                                                TRAUMA RELATED RISK FACTORS  [ ] Fracture of the hip, pelvis, or leg                       (5 Points)  [ ] Spinal cord injury resulting in paralysis             (5 points)       (in the previous month)    [ ] Paralysis  (less than 1 month)                             (5 Points)  [ ] Multiple Trauma within 1 month                        (5 Points)    Total Score [ 9       ]    Caprini Score 0-2: Low Risk, NO VTE prophylaxis required for most patients, encourage ambulation  Caprini Score 3-6: Moderate Risk , pharmacologic VTE prophylaxis is indicated for most patients (in the absence of contraindications)  Caprini Score Greater than or =7: High risk, pharmocologic VTE prophylaxis indicated for most patients (in the absence of contraindications)    OPIOID RISK TOOL    DAVID EACH BOX THAT APPLIES AND ADD TOTALS AT THE END    FAMILY HISTORY OF SUBSTANCE ABUSE                 FEMALE         MALE                                                Alcohol                             [  ]1 pt          [  ]3pts                                               Illegal Durgs                     [  ]2 pts        [  ]3pts                                               Rx Drugs                           [  ]4 pts        [  ]4 pts    PERSONAL HISTORY OF SUBSTANCE ABUSE                                                                                          Alcohol                             [  ]3 pts       [  ]3 pts                                               Illegal Drugs                     [  ]4 pts        [  ]4 pts                                               Rx Drugs                           [  ]5 pts        [  ]5 pts    AGE BETWEEN 16-45 YEARS                                      [  ]1 pt         [  ]1 pt    HISTORY OF PREADOLESCENT   SEXUAL ABUSE                                                             [  ]3 pts        [  ]0pts    PSYCHOLOGICAL DISEASE                     ADD, OCD, Bipolar, Schizophrenia        [  ]2 pts         [  ]2 pts                      Depression                                               [  ]1 pt           [  ]1 pt           SCORING TOTAL   0)                                     A score of 3 or lower indicated LOW risk for future opioid abuse  A score of 4 to 7 indicated moderate risk for future opioid abuse  A score of 8 or higher indicates a high risk for opioid abuse

## 2023-11-22 NOTE — H&P PST ADULT - MUSCULOSKELETAL
negative no b/l calf pain/tenderness/erythema/edema/normal/ROM intact/no calf tenderness/normal gait/strength 5/5 bilateral upper extremities/strength 5/5 bilateral lower extremities

## 2023-11-22 NOTE — H&P PST ADULT - PROBLEM SELECTOR PLAN 1
Aware to continue cardiac medications as prescribed and to take scheduled morning medications morning of procedure with small sip water, receptive  Written & verbal instructions provided to pt for all education/instructions, questions encouraged/addressed, pt verbalized understandings of all education/instructions, teach back method utilized    EKG, labs performed, results pending  Patient educated on surgical scrub, COVID testing, preadmission instructions, medical clearance and day of procedure medications, verbalizes understanding.   Written & verbal instructions provided to pt for all education/instructions, questions encouraged/addressed, pt verbalized understandings of all education/instructions, teach back method utilized   Pt instructed to stop vitamins/supplements/herbal medications/ASA/NSAIDS for one week prior to surgery and discuss with PMD. EKG, labs performed, results pending  Patient educated on surgical scrub, COVID testing, preadmission instructions, medical clearance and day of procedure medications, verbalizes understanding.   Written & verbal instructions provided to pt for all education/instructions, questions encouraged/addressed, pt verbalized understandings of all education/instructions, teach back method utilized   Pt instructed to stop vitamins/supplements/herbal medications/ASA/NSAIDS for one week prior to surgery and discuss with PMD. Scheduled for Left Transcarotid Artery Revascularization Possible Endarterectomy on 12.12.2023 with Dr. Eli, pending medical, cardiac and pulmonology clearances requested by surgeon.   EKG, labs performed, results pending  Patient educated on surgical scrub, COVID testing, preadmission instructions, medical clearance and day of procedure medications, verbalizes understanding.   Written & verbal instructions provided to pt for all education/instructions, questions encouraged/addressed, pt verbalized understandings of all education/instructions, teach back method utilized   Pt instructed to stop vitamins/supplements/herbal medications/ASA/NSAIDS for one week prior to surgery and discuss with PMD.

## 2023-11-22 NOTE — H&P PST ADULT - NEGATIVE RESPIRATORY AND THORAX SYMPTOMS
has apt for pulmonologist 11.29.2023 for clearance as requested by surgeon, does not see pulmonology otherwise/no wheezing/no dyspnea/no cough

## 2023-11-22 NOTE — H&P PST ADULT - HISTORY OF PRESENT ILLNESS
Pt is a pleasant 84 y/o female, PMH CAD, CABG X3, HTN, cardiomyopathy, STU, COPD, CKD, lung adenocarcinoma, rectal CA, presents to PST with c/o     .  Carotid duplex performed in the office today demonstrates 70 to 99% stenosis of the proximal left ICA, and less than 50% stenosis of the right ICA. Bilateral antegrade flow of vertebral arteries.      presenting for evaluation after being found to have 90% L ICA stenosis on recent CT angiogram neck after recent fall. Patient was recently hospitalized s/p fall at home - reports knees gave out. She denies symptoms of carotid stenosis including unilateral weakness, amaurosis fugax, or hx of stroke. Patient currently awaiting follow up imaging for lung tumor and if RT fails, she is agreeable to undergo surgical intervention to remove the mass (VATS) which was offered by Dr. Paz    Pt reports feeling generally well, denies recent fever/cough/cold, infection or abx use.  Scheduled for Left Transcarotid Artery Revascularization Possible Endarterectomy on 12.12.2023 with Dr. Eli, pending medical, cardiac and pulmonology clearances requested by surgeon.  Pt is a pleasant 84 y/o female, PMH CAD, CABG X3, HTN, cardiomyopathy, STU, COPD, CKD, lung adenocarcinoma, lung CA s/p radiation 04/2023, presents to PST with c/o carotid stenosis.  Jennifer explains going to [facility] for evaluation after a fall at home 05/2023, incidental finding during hospital stay to have "90% L ICA stenosis on recent CT angiogram neck" per surgeon's note.  Referred to surgeon, completed carotid doppler in office which revealed "70 to 99% stenosis of the proximal left ICA, and less than 50% stenosis of the right ICA. Bilateral antegrade flow of vertebral arteries."  Now scheduled for intervention.  Pt reports feeling generally well, denies visual changes/disturbance, HA, numbness/tingling/weakness, SOB, chest pain.  Denies recent fever/cough/cold, infection or abx use.  Scheduled for Left Transcarotid Artery Revascularization Possible Endarterectomy on 12.12.2023 with Dr. Eli, pending medical, cardiac and pulmonology clearances requested by surgeon.  Pt is a pleasant 84 y/o female, PMH CAD, CABG X3, HTN, cardiomyopathy, STU, COPD, CKD, lung adenocarcinoma, lung CA s/p radiation 04/2023, presents to PST with c/o carotid stenosis.  Jennifer explains going to [facility] for evaluation after a fall at home 05/2023, incidental finding during hospital stay to have "90% L ICA stenosis on recent CT angiogram neck" per surgeon's note.  Referred to surgeon, completed carotid doppler in office which revealed "70 to 99% stenosis of the proximal left ICA, and less than 50% stenosis of the right ICA. Bilateral antegrade flow of vertebral arteries."  Now scheduled for intervention.  Pt reports feeling generally well, denies visual changes/disturbance, HA, numbness/tingling/weakness, SOB, chest pain.  Denies recent fever/cough/cold, infection or abx use.  Scheduled for Left Transcarotid Artery Revascularization Possible Endarterectomy on 12.12.2023 with Dr. Eli, pending medical, cardiac and pulmonology clearances requested by surgeon.  Pt is a pleasant 82 y/o female, PMH CAD, CABG X3, HTN, cardiomyopathy, STU, COPD, CKD, lung adenocarcinoma, lung CA s/p radiation 04/2023, presents to PST with c/o carotid stenosis.  Jennifer explains going to [facility] for evaluation after a fall at home 05/2023, incidental finding during hospital stay to have "90% L ICA stenosis on recent CT angiogram neck" per surgeon's note.  Referred to surgeon, completed carotid doppler in office which revealed "70 to 99% stenosis of the proximal left ICA, and less than 50% stenosis of the right ICA. Bilateral antegrade flow of vertebral arteries."  Now scheduled for intervention.  Pt reports feeling generally well, denies visual changes/disturbance, HA, numbness/tingling/weakness, SOB, chest pain.  Denies recent fever/cough/cold, infection or abx use.  Scheduled for Left Transcarotid Artery Revascularization Possible Endarterectomy on 12.12.2023 with Dr. Eli, pending medical, cardiac and pulmonology clearances requested by surgeon.  Pt is a pleasant 84 y/o female, PMH CAD, CABG X3, HTN, cardiomyopathy, STU, COPD, CKD, lung adenocarcinoma s/p radiation 04/2023, presents to PST with c/o carotid stenosis.  Jennifer explains going to [facility] for evaluation after a fall at home from "knee buckling" on 05/2023, incidental finding during hospital stay of 90% L ICA stenosis. Referred to surgeon, completed carotid doppler in office which revealed "70 to 99% stenosis of the proximal left ICA, and less than 50% stenosis of the right ICA. Bilateral antegrade flow of vertebral arteries."  Now scheduled for intervention.  Pt reports feeling generally well, denies visual changes/disturbance, HA, numbness/tingling/weakness, SOB, chest pain.  Denies recent fever/cough/cold, infection or abx use.  Scheduled for Left Transcarotid Artery Revascularization Possible Endarterectomy on 12.12.2023 with Dr. Eli, pending medical, cardiac and pulmonology clearances requested by surgeon.  Pt is a pleasant 84 y/o female, PMH CAD, CABG X3, HTN, cardiomyopathy, STU, COPD, CKD, lung adenocarcinoma s/p radiation 04/2023, presents to PST with c/o carotid stenosis.  Jennifer explains going to [facility] for evaluation after a fall at home from "knee buckling" on 05/2023, incidental finding during hospital stay of 90% L ICA stenosis. Referred to surgeon, completed carotid doppler in office which revealed "70 to 99% stenosis of the proximal left ICA, and less than 50% stenosis of the right ICA. Bilateral antegrade flow of vertebral arteries."  Now scheduled for intervention.  Pt reports feeling generally well, denies visual changes/disturbance, HA, numbness/tingling/weakness, SOB, chest pain.  Denies recent fever/cough/cold, infection or abx use.  Scheduled for Left Transcarotid Artery Revascularization Possible Endarterectomy on 12.12.2023 with Dr. Eli, pending medical, cardiac and pulmonology clearances requested by surgeon.  Pt is a pleasant 84 y/o female, PMH CAD, CABG X3, HTN, cardiomyopathy, STU, COPD, CKD, lung adenocarcinoma s/p radiation 04/2023, presents to PST with c/o carotid stenosis.  Jennifer explains going to [facility] for evaluation after a fall at home from "knee buckling" on 05/2023, incidental finding during hospital stay of 90% L ICA stenosis. Referred to surgeon, completed carotid doppler in office which revealed "70 to 99% stenosis of the proximal left ICA, and less than 50% stenosis of the right ICA. Bilateral antegrade flow of vertebral arteries."  Now scheduled for intervention.  Pt reports feeling generally well, denies visual changes/disturbance, HA, numbness/tingling/weakness, SOB, chest pain.  Denies recent fever/cough/cold, infection or abx use.  Scheduled for Left Transcarotid Artery Revascularization Possible Endarterectomy on 12.12.2023 with Dr. Eli, pending medical, cardiac and pulmonology clearances requested by surgeon.

## 2023-11-22 NOTE — H&P PST ADULT - PROBLEM SELECTOR PLAN 4
Total Score [        ]    Caprini Score 0-2: Low Risk, NO VTE prophylaxis required for most patients, encourage ambulation  Caprini Score 3-6: Moderate Risk , pharmacologic VTE prophylaxis is indicated for most patients (in the absence of contraindications)  Caprini Score Greater than or =7: High risk, pharmocologic VTE prophylaxis indicated for most patients (in the absence of contraindications) Total Score [  9      ]    Caprini Score 0-2: Low Risk, NO VTE prophylaxis required for most patients, encourage ambulation  Caprini Score 3-6: Moderate Risk , pharmacologic VTE prophylaxis is indicated for most patients (in the absence of contraindications)  Caprini Score Greater than or =7: High risk, pharmocologic VTE prophylaxis indicated for most patients (in the absence of contraindications)

## 2023-11-22 NOTE — H&P PST ADULT - PROBLEM SELECTOR PLAN 2
Total Score [        ]    Caprini Score 0-2: Low Risk, NO VTE prophylaxis required for most patients, encourage ambulation  Caprini Score 3-6: Moderate Risk , pharmacologic VTE prophylaxis is indicated for most patients (in the absence of contraindications)  Caprini Score Greater than or =7: High risk, pharmocologic VTE prophylaxis indicated for most patients (in the absence of contraindications) Aware to continue cardiac medications as prescribed and to take scheduled morning medications morning of procedure with small sip water, receptive  Written & verbal instructions provided to pt for all education/instructions, questions encouraged/addressed, pt verbalized understandings of all education/instructions, teach back method utilized Aware to continue all cardiac medications as prescribed and to take scheduled morning medications morning of procedure with small sip water, receptive  Written & verbal instructions provided to pt for all education/instructions, questions encouraged/addressed, pt verbalized understandings of all education/instructions, teach back method utilized

## 2023-11-29 ENCOUNTER — APPOINTMENT (OUTPATIENT)
Dept: PULMONOLOGY | Facility: CLINIC | Age: 83
End: 2023-11-29
Payer: MEDICARE

## 2023-11-29 VITALS
HEIGHT: 72 IN | HEART RATE: 98 BPM | SYSTOLIC BLOOD PRESSURE: 114 MMHG | TEMPERATURE: 97.2 F | WEIGHT: 175 LBS | OXYGEN SATURATION: 93 % | DIASTOLIC BLOOD PRESSURE: 72 MMHG | BODY MASS INDEX: 23.7 KG/M2

## 2023-11-29 DIAGNOSIS — J43.9 EMPHYSEMA, UNSPECIFIED: ICD-10-CM

## 2023-11-29 PROBLEM — I25.10 ATHEROSCLEROTIC HEART DISEASE OF NATIVE CORONARY ARTERY WITHOUT ANGINA PECTORIS: Chronic | Status: ACTIVE | Noted: 2023-11-22

## 2023-11-29 PROBLEM — G47.33 OBSTRUCTIVE SLEEP APNEA (ADULT) (PEDIATRIC): Chronic | Status: ACTIVE | Noted: 2023-11-22

## 2023-11-29 PROBLEM — Z86.79 PERSONAL HISTORY OF OTHER DISEASES OF THE CIRCULATORY SYSTEM: Chronic | Status: ACTIVE | Noted: 2023-11-22

## 2023-11-29 PROCEDURE — 99205 OFFICE O/P NEW HI 60 MIN: CPT

## 2023-11-29 RX ORDER — DICYCLOMINE HYDROCHLORIDE 10 MG/1
10 CAPSULE ORAL
Qty: 90 | Refills: 0 | Status: DISCONTINUED | COMMUNITY
Start: 2022-04-04 | End: 2023-11-29

## 2023-11-29 RX ORDER — SULFAMETHOXAZOLE AND TRIMETHOPRIM 800; 160 MG/1; MG/1
800-160 TABLET ORAL TWICE DAILY
Qty: 14 | Refills: 0 | Status: DISCONTINUED | COMMUNITY
Start: 2023-07-13 | End: 2023-11-29

## 2023-11-29 RX ORDER — HYALURONATE SODIUM 20 MG/2 ML
20 SYRINGE (ML) INTRAARTICULAR
Qty: 2 | Refills: 0 | Status: DISCONTINUED | OUTPATIENT
Start: 2023-08-03 | End: 2023-11-29

## 2023-11-29 RX ORDER — MELOXICAM 15 MG/1
15 TABLET ORAL
Qty: 30 | Refills: 0 | Status: DISCONTINUED | COMMUNITY
Start: 2023-10-02 | End: 2023-11-29

## 2023-11-30 ENCOUNTER — APPOINTMENT (OUTPATIENT)
Dept: PULMONOLOGY | Facility: CLINIC | Age: 83
End: 2023-11-30
Payer: MEDICARE

## 2023-11-30 PROCEDURE — 94010 BREATHING CAPACITY TEST: CPT

## 2023-11-30 PROCEDURE — 94727 GAS DIL/WSHOT DETER LNG VOL: CPT

## 2023-11-30 PROCEDURE — 94729 DIFFUSING CAPACITY: CPT

## 2023-12-06 ENCOUNTER — NON-APPOINTMENT (OUTPATIENT)
Age: 83
End: 2023-12-06

## 2023-12-06 ENCOUNTER — APPOINTMENT (OUTPATIENT)
Dept: CARDIOLOGY | Facility: CLINIC | Age: 83
End: 2023-12-06

## 2023-12-06 ENCOUNTER — OUTPATIENT (OUTPATIENT)
Dept: OUTPATIENT SERVICES | Facility: HOSPITAL | Age: 83
LOS: 1 days | End: 2023-12-06
Payer: MEDICARE

## 2023-12-06 DIAGNOSIS — K62.89 OTHER SPECIFIED DISEASES OF ANUS AND RECTUM: Chronic | ICD-10-CM

## 2023-12-06 DIAGNOSIS — Z01.812 ENCOUNTER FOR PREPROCEDURAL LABORATORY EXAMINATION: ICD-10-CM

## 2023-12-06 DIAGNOSIS — Z95.1 PRESENCE OF AORTOCORONARY BYPASS GRAFT: Chronic | ICD-10-CM

## 2023-12-06 DIAGNOSIS — Z98.49 CATARACT EXTRACTION STATUS, UNSPECIFIED EYE: Chronic | ICD-10-CM

## 2023-12-06 LAB
PA ADP PRP-ACNC: 105 PRU — LOW (ref 180–376)
PA ADP PRP-ACNC: 105 PRU — LOW (ref 180–376)

## 2023-12-06 PROCEDURE — 36415 COLL VENOUS BLD VENIPUNCTURE: CPT

## 2023-12-06 PROCEDURE — 85576 BLOOD PLATELET AGGREGATION: CPT

## 2023-12-06 NOTE — CHART NOTE - NSCHARTNOTEFT_GEN_A_CORE
Spoke to Carley at surgeon's office. Pt. to come in to PST tomorrow 12/7/23 12PM to have lab P2Y12 drawn. Team made aware. Results pending.

## 2023-12-08 ENCOUNTER — NON-APPOINTMENT (OUTPATIENT)
Age: 83
End: 2023-12-08

## 2023-12-11 ENCOUNTER — TRANSCRIPTION ENCOUNTER (OUTPATIENT)
Age: 83
End: 2023-12-11

## 2023-12-11 NOTE — ASU PATIENT PROFILE, ADULT - NSICDXPASTMEDICALHX_GEN_ALL_CORE_FT
PAST MEDICAL HISTORY:  Angina pectoris     Anxiety     CAD (coronary artery disease)     Chronic back pain     H/O carotid stenosis     History of COPD     Hyperlipidemia     Hypertension     Lumbar stenosis     STU (obstructive sleep apnea)     Rectal mass

## 2023-12-12 ENCOUNTER — INPATIENT (INPATIENT)
Facility: HOSPITAL | Age: 83
LOS: 0 days | Discharge: HOME HEALTH | DRG: 35 | End: 2023-12-13
Attending: SURGERY | Admitting: SURGERY
Payer: MEDICARE

## 2023-12-12 ENCOUNTER — APPOINTMENT (OUTPATIENT)
Dept: VASCULAR SURGERY | Facility: HOSPITAL | Age: 83
End: 2023-12-12

## 2023-12-12 ENCOUNTER — TRANSCRIPTION ENCOUNTER (OUTPATIENT)
Age: 83
End: 2023-12-12

## 2023-12-12 VITALS — WEIGHT: 165.57 LBS

## 2023-12-12 DIAGNOSIS — Z95.1 PRESENCE OF AORTOCORONARY BYPASS GRAFT: Chronic | ICD-10-CM

## 2023-12-12 DIAGNOSIS — K62.89 OTHER SPECIFIED DISEASES OF ANUS AND RECTUM: Chronic | ICD-10-CM

## 2023-12-12 DIAGNOSIS — I65.29 OCCLUSION AND STENOSIS OF UNSPECIFIED CAROTID ARTERY: ICD-10-CM

## 2023-12-12 DIAGNOSIS — Z98.49 CATARACT EXTRACTION STATUS, UNSPECIFIED EYE: Chronic | ICD-10-CM

## 2023-12-12 LAB
ANION GAP SERPL CALC-SCNC: 13 MMOL/L — SIGNIFICANT CHANGE UP (ref 5–17)
ANION GAP SERPL CALC-SCNC: 13 MMOL/L — SIGNIFICANT CHANGE UP (ref 5–17)
BLD GP AB SCN SERPL QL: SIGNIFICANT CHANGE UP
BLD GP AB SCN SERPL QL: SIGNIFICANT CHANGE UP
BUN SERPL-MCNC: 18.7 MG/DL — SIGNIFICANT CHANGE UP (ref 8–20)
BUN SERPL-MCNC: 18.7 MG/DL — SIGNIFICANT CHANGE UP (ref 8–20)
CALCIUM SERPL-MCNC: 8.8 MG/DL — SIGNIFICANT CHANGE UP (ref 8.4–10.5)
CALCIUM SERPL-MCNC: 8.8 MG/DL — SIGNIFICANT CHANGE UP (ref 8.4–10.5)
CHLORIDE SERPL-SCNC: 104 MMOL/L — SIGNIFICANT CHANGE UP (ref 96–108)
CHLORIDE SERPL-SCNC: 104 MMOL/L — SIGNIFICANT CHANGE UP (ref 96–108)
CO2 SERPL-SCNC: 22 MMOL/L — SIGNIFICANT CHANGE UP (ref 22–29)
CO2 SERPL-SCNC: 22 MMOL/L — SIGNIFICANT CHANGE UP (ref 22–29)
CREAT SERPL-MCNC: 0.71 MG/DL — SIGNIFICANT CHANGE UP (ref 0.5–1.3)
CREAT SERPL-MCNC: 0.71 MG/DL — SIGNIFICANT CHANGE UP (ref 0.5–1.3)
EGFR: 85 ML/MIN/1.73M2 — SIGNIFICANT CHANGE UP
EGFR: 85 ML/MIN/1.73M2 — SIGNIFICANT CHANGE UP
GLUCOSE SERPL-MCNC: 103 MG/DL — HIGH (ref 70–99)
GLUCOSE SERPL-MCNC: 103 MG/DL — HIGH (ref 70–99)
HCT VFR BLD CALC: 38.9 % — SIGNIFICANT CHANGE UP (ref 34.5–45)
HCT VFR BLD CALC: 38.9 % — SIGNIFICANT CHANGE UP (ref 34.5–45)
HGB BLD-MCNC: 12.1 G/DL — SIGNIFICANT CHANGE UP (ref 11.5–15.5)
HGB BLD-MCNC: 12.1 G/DL — SIGNIFICANT CHANGE UP (ref 11.5–15.5)
MAGNESIUM SERPL-MCNC: 1.7 MG/DL — SIGNIFICANT CHANGE UP (ref 1.6–2.6)
MAGNESIUM SERPL-MCNC: 1.7 MG/DL — SIGNIFICANT CHANGE UP (ref 1.6–2.6)
MCHC RBC-ENTMCNC: 25.3 PG — LOW (ref 27–34)
MCHC RBC-ENTMCNC: 25.3 PG — LOW (ref 27–34)
MCHC RBC-ENTMCNC: 31.1 GM/DL — LOW (ref 32–36)
MCHC RBC-ENTMCNC: 31.1 GM/DL — LOW (ref 32–36)
MCV RBC AUTO: 81.4 FL — SIGNIFICANT CHANGE UP (ref 80–100)
MCV RBC AUTO: 81.4 FL — SIGNIFICANT CHANGE UP (ref 80–100)
PHOSPHATE SERPL-MCNC: 4 MG/DL — SIGNIFICANT CHANGE UP (ref 2.4–4.7)
PHOSPHATE SERPL-MCNC: 4 MG/DL — SIGNIFICANT CHANGE UP (ref 2.4–4.7)
PLATELET # BLD AUTO: 211 K/UL — SIGNIFICANT CHANGE UP (ref 150–400)
PLATELET # BLD AUTO: 211 K/UL — SIGNIFICANT CHANGE UP (ref 150–400)
POTASSIUM SERPL-MCNC: 4.1 MMOL/L — SIGNIFICANT CHANGE UP (ref 3.5–5.3)
POTASSIUM SERPL-MCNC: 4.1 MMOL/L — SIGNIFICANT CHANGE UP (ref 3.5–5.3)
POTASSIUM SERPL-MCNC: 4.4 MMOL/L — SIGNIFICANT CHANGE UP (ref 3.5–5.3)
POTASSIUM SERPL-MCNC: 4.4 MMOL/L — SIGNIFICANT CHANGE UP (ref 3.5–5.3)
POTASSIUM SERPL-SCNC: 4.1 MMOL/L — SIGNIFICANT CHANGE UP (ref 3.5–5.3)
POTASSIUM SERPL-SCNC: 4.1 MMOL/L — SIGNIFICANT CHANGE UP (ref 3.5–5.3)
POTASSIUM SERPL-SCNC: 4.4 MMOL/L — SIGNIFICANT CHANGE UP (ref 3.5–5.3)
POTASSIUM SERPL-SCNC: 4.4 MMOL/L — SIGNIFICANT CHANGE UP (ref 3.5–5.3)
RBC # BLD: 4.78 M/UL — SIGNIFICANT CHANGE UP (ref 3.8–5.2)
RBC # BLD: 4.78 M/UL — SIGNIFICANT CHANGE UP (ref 3.8–5.2)
RBC # FLD: 14.1 % — SIGNIFICANT CHANGE UP (ref 10.3–14.5)
RBC # FLD: 14.1 % — SIGNIFICANT CHANGE UP (ref 10.3–14.5)
SODIUM SERPL-SCNC: 139 MMOL/L — SIGNIFICANT CHANGE UP (ref 135–145)
SODIUM SERPL-SCNC: 139 MMOL/L — SIGNIFICANT CHANGE UP (ref 135–145)
WBC # BLD: 7.75 K/UL — SIGNIFICANT CHANGE UP (ref 3.8–10.5)
WBC # BLD: 7.75 K/UL — SIGNIFICANT CHANGE UP (ref 3.8–10.5)
WBC # FLD AUTO: 7.75 K/UL — SIGNIFICANT CHANGE UP (ref 3.8–10.5)
WBC # FLD AUTO: 7.75 K/UL — SIGNIFICANT CHANGE UP (ref 3.8–10.5)

## 2023-12-12 PROCEDURE — G0463: CPT

## 2023-12-12 PROCEDURE — 37215 TRANSCATH STENT CCA W/EPS: CPT | Mod: GC,LT

## 2023-12-12 PROCEDURE — 93010 ELECTROCARDIOGRAM REPORT: CPT

## 2023-12-12 PROCEDURE — 86923 COMPATIBILITY TEST ELECTRIC: CPT

## 2023-12-12 PROCEDURE — 76937 US GUIDE VASCULAR ACCESS: CPT | Mod: 26,GC

## 2023-12-12 DEVICE — IMPLANTABLE DEVICE: Type: IMPLANTABLE DEVICE | Site: LEFT | Status: FUNCTIONAL

## 2023-12-12 DEVICE — KIT A-LINE 1LUM 20G X 12CM SAFE KIT: Type: IMPLANTABLE DEVICE | Site: LEFT | Status: FUNCTIONAL

## 2023-12-12 DEVICE — STENT TRANSCAROTID ENROUTE 9X40: Type: IMPLANTABLE DEVICE | Site: LEFT | Status: FUNCTIONAL

## 2023-12-12 DEVICE — SYS TRANSCAROTID NEUROPROTECTION: Type: IMPLANTABLE DEVICE | Site: LEFT | Status: FUNCTIONAL

## 2023-12-12 DEVICE — CLIP APPLIER ETHICON LIGACLIP 11.5" MEDIUM: Type: IMPLANTABLE DEVICE | Site: LEFT | Status: FUNCTIONAL

## 2023-12-12 DEVICE — KIT INTRODUCER MICRO 21GAX7CM 7FR: Type: IMPLANTABLE DEVICE | Site: LEFT | Status: FUNCTIONAL

## 2023-12-12 DEVICE — BLLN STERLING MONORAIL 5X30MMX135CM: Type: IMPLANTABLE DEVICE | Site: LEFT | Status: FUNCTIONAL

## 2023-12-12 DEVICE — CLIP APPLIER COVIDIEN SURGICLIP III 9" SM: Type: IMPLANTABLE DEVICE | Site: LEFT | Status: FUNCTIONAL

## 2023-12-12 DEVICE — AGENT HEMOSTATIC HEMOBLAST 1.65G 10CM: Type: IMPLANTABLE DEVICE | Site: LEFT | Status: FUNCTIONAL

## 2023-12-12 DEVICE — GWIRE ENROUTE 0.014X95CM: Type: IMPLANTABLE DEVICE | Site: LEFT | Status: FUNCTIONAL

## 2023-12-12 DEVICE — SURGIFOAM PAD 8CM X 12.5CM X 10MM (100): Type: IMPLANTABLE DEVICE | Site: LEFT | Status: FUNCTIONAL

## 2023-12-12 RX ORDER — CEFAZOLIN SODIUM 1 G
2000 VIAL (EA) INJECTION ONCE
Refills: 0 | Status: DISCONTINUED | OUTPATIENT
Start: 2023-12-12 | End: 2023-12-12

## 2023-12-12 RX ORDER — FENTANYL CITRATE 50 UG/ML
25 INJECTION INTRAVENOUS
Refills: 0 | Status: DISCONTINUED | OUTPATIENT
Start: 2023-12-12 | End: 2023-12-12

## 2023-12-12 RX ORDER — SODIUM CHLORIDE 9 MG/ML
500 INJECTION, SOLUTION INTRAVENOUS ONCE
Refills: 0 | Status: COMPLETED | OUTPATIENT
Start: 2023-12-12 | End: 2023-12-12

## 2023-12-12 RX ORDER — LANOLIN ALCOHOL/MO/W.PET/CERES
5 CREAM (GRAM) TOPICAL AT BEDTIME
Refills: 0 | Status: DISCONTINUED | OUTPATIENT
Start: 2023-12-12 | End: 2023-12-13

## 2023-12-12 RX ORDER — FENTANYL CITRATE 50 UG/ML
50 INJECTION INTRAVENOUS
Refills: 0 | Status: DISCONTINUED | OUTPATIENT
Start: 2023-12-12 | End: 2023-12-12

## 2023-12-12 RX ORDER — SODIUM CHLORIDE 9 MG/ML
3 INJECTION INTRAMUSCULAR; INTRAVENOUS; SUBCUTANEOUS EVERY 8 HOURS
Refills: 0 | Status: DISCONTINUED | OUTPATIENT
Start: 2023-12-12 | End: 2023-12-12

## 2023-12-12 RX ORDER — ASPIRIN/CALCIUM CARB/MAGNESIUM 324 MG
81 TABLET ORAL DAILY
Refills: 0 | Status: DISCONTINUED | OUTPATIENT
Start: 2023-12-12 | End: 2023-12-13

## 2023-12-12 RX ORDER — CEFAZOLIN SODIUM 1 G
2000 VIAL (EA) INJECTION
Refills: 0 | Status: DISCONTINUED | OUTPATIENT
Start: 2023-12-12 | End: 2023-12-12

## 2023-12-12 RX ORDER — SODIUM CHLORIDE 9 MG/ML
1000 INJECTION INTRAMUSCULAR; INTRAVENOUS; SUBCUTANEOUS
Refills: 0 | Status: DISCONTINUED | OUTPATIENT
Start: 2023-12-12 | End: 2023-12-13

## 2023-12-12 RX ORDER — ATORVASTATIN CALCIUM 80 MG/1
40 TABLET, FILM COATED ORAL AT BEDTIME
Refills: 0 | Status: DISCONTINUED | OUTPATIENT
Start: 2023-12-12 | End: 2023-12-13

## 2023-12-12 RX ORDER — ALPRAZOLAM 0.25 MG
1 TABLET ORAL
Refills: 0 | Status: DISCONTINUED | OUTPATIENT
Start: 2023-12-12 | End: 2023-12-13

## 2023-12-12 RX ORDER — CLOPIDOGREL BISULFATE 75 MG/1
75 TABLET, FILM COATED ORAL DAILY
Refills: 0 | Status: DISCONTINUED | OUTPATIENT
Start: 2023-12-12 | End: 2023-12-13

## 2023-12-12 RX ORDER — KETOROLAC TROMETHAMINE 30 MG/ML
30 SYRINGE (ML) INJECTION EVERY 6 HOURS
Refills: 0 | Status: DISCONTINUED | OUTPATIENT
Start: 2023-12-12 | End: 2023-12-13

## 2023-12-12 RX ORDER — SENNA PLUS 8.6 MG/1
2 TABLET ORAL DAILY
Refills: 0 | Status: DISCONTINUED | OUTPATIENT
Start: 2023-12-12 | End: 2023-12-13

## 2023-12-12 RX ORDER — ACETAMINOPHEN 500 MG
1000 TABLET ORAL ONCE
Refills: 0 | Status: COMPLETED | OUTPATIENT
Start: 2023-12-12 | End: 2023-12-12

## 2023-12-12 RX ORDER — MAGNESIUM SULFATE 500 MG/ML
2 VIAL (ML) INJECTION ONCE
Refills: 0 | Status: COMPLETED | OUTPATIENT
Start: 2023-12-12 | End: 2023-12-12

## 2023-12-12 RX ORDER — ASPIRIN/CALCIUM CARB/MAGNESIUM 324 MG
81 TABLET ORAL DAILY
Refills: 0 | Status: DISCONTINUED | OUTPATIENT
Start: 2023-12-12 | End: 2023-12-12

## 2023-12-12 RX ORDER — SODIUM CHLORIDE 9 MG/ML
1000 INJECTION, SOLUTION INTRAVENOUS
Refills: 0 | Status: DISCONTINUED | OUTPATIENT
Start: 2023-12-12 | End: 2023-12-12

## 2023-12-12 RX ORDER — CEFAZOLIN SODIUM 1 G
2000 VIAL (EA) INJECTION
Refills: 0 | Status: COMPLETED | OUTPATIENT
Start: 2023-12-12 | End: 2023-12-13

## 2023-12-12 RX ORDER — ONDANSETRON 8 MG/1
4 TABLET, FILM COATED ORAL ONCE
Refills: 0 | Status: DISCONTINUED | OUTPATIENT
Start: 2023-12-12 | End: 2023-12-12

## 2023-12-12 RX ORDER — ONDANSETRON 8 MG/1
4 TABLET, FILM COATED ORAL EVERY 6 HOURS
Refills: 0 | Status: DISCONTINUED | OUTPATIENT
Start: 2023-12-12 | End: 2023-12-13

## 2023-12-12 RX ORDER — ACETAMINOPHEN 500 MG
650 TABLET ORAL EVERY 6 HOURS
Refills: 0 | Status: DISCONTINUED | OUTPATIENT
Start: 2023-12-12 | End: 2023-12-13

## 2023-12-12 RX ORDER — KETOROLAC TROMETHAMINE 30 MG/ML
15 SYRINGE (ML) INJECTION EVERY 6 HOURS
Refills: 0 | Status: DISCONTINUED | OUTPATIENT
Start: 2023-12-12 | End: 2023-12-13

## 2023-12-12 RX ORDER — PHENYLEPHRINE HYDROCHLORIDE 10 MG/ML
0.5 INJECTION INTRAVENOUS
Qty: 40 | Refills: 0 | Status: DISCONTINUED | OUTPATIENT
Start: 2023-12-12 | End: 2023-12-12

## 2023-12-12 RX ADMIN — ATORVASTATIN CALCIUM 40 MILLIGRAM(S): 80 TABLET, FILM COATED ORAL at 21:39

## 2023-12-12 RX ADMIN — Medication 400 MILLIGRAM(S): at 15:30

## 2023-12-12 RX ADMIN — SODIUM CHLORIDE 75 MILLILITER(S): 9 INJECTION INTRAMUSCULAR; INTRAVENOUS; SUBCUTANEOUS at 15:05

## 2023-12-12 RX ADMIN — Medication 50 GRAM(S): at 15:12

## 2023-12-12 RX ADMIN — SODIUM CHLORIDE 1000 MILLILITER(S): 9 INJECTION, SOLUTION INTRAVENOUS at 15:13

## 2023-12-12 RX ADMIN — SODIUM CHLORIDE 1000 MILLILITER(S): 9 INJECTION, SOLUTION INTRAVENOUS at 14:31

## 2023-12-12 RX ADMIN — Medication 1000 MILLIGRAM(S): at 16:00

## 2023-12-12 RX ADMIN — Medication 5 MILLIGRAM(S): at 21:42

## 2023-12-12 RX ADMIN — Medication 30 MILLIGRAM(S): at 23:30

## 2023-12-12 RX ADMIN — Medication 2000 MILLIGRAM(S): at 17:55

## 2023-12-12 RX ADMIN — SODIUM CHLORIDE 1000 MILLILITER(S): 9 INJECTION, SOLUTION INTRAVENOUS at 11:20

## 2023-12-12 RX ADMIN — SODIUM CHLORIDE 75 MILLILITER(S): 9 INJECTION, SOLUTION INTRAVENOUS at 11:27

## 2023-12-12 RX ADMIN — PHENYLEPHRINE HYDROCHLORIDE 13.9 MICROGRAM(S)/KG/MIN: 10 INJECTION INTRAVENOUS at 12:06

## 2023-12-12 NOTE — ASU PREOP CHECKLIST - BSA (M2)
Electrodesiccation Text: The wound bed was treated with electrodesiccation after the biopsy was performed. Bill For Surgical Tray: no Biopsy Type: H and E Wound Care: Petrolatum X Size Of Lesion In Cm: 0 Notification Instructions: Patient will be notified of biopsy results. However, patient instructed to call the office if not contacted within 2 weeks. Depth Of Biopsy: dermis Was A Bandage Applied: Yes Dressing: bandage Anesthesia Volume In Cc (Will Not Render If 0): 0.5 Type Of Destruction Used: Curettage Consent: verbal consent was obtained and risks were reviewed including but not limited to scarring, infection, bleeding, scabbing, incomplete removal, nerve damage and allergy to anesthesia. Electrodesiccation And Curettage Text: The wound bed was treated with electrodesiccation and curettage after the biopsy was performed. Detail Level: Detailed Biopsy Method: Dermablade Curettage Text: The wound bed was treated with curettage after the biopsy was performed. Billing Type: Third-Party Bill Lab: 6 Hemostasis: Drysol Silver Nitrate Text: The wound bed was treated with silver nitrate after the biopsy was performed. Cryotherapy Text: The wound bed was treated with cryotherapy after the biopsy was performed. Anesthesia Type: 1% lidocaine with epinephrine Post-Care Instructions: I reviewed with the patient in detail post-care instructions. Patient is to keep the biopsy site dry overnight, and then apply bacitracin twice daily until healed. Patient may apply hydrogen peroxide soaks to remove any crusting. 1.81

## 2023-12-12 NOTE — PROGRESS NOTE ADULT - SUBJECTIVE AND OBJECTIVE BOX
Called to bedside by RN for Pt. with 10 beat run of non sustained V-tach seen on monitor.  Pt. asymptomatic, offers no complaints.  /54, HR-88.  Labs sent, 12-lead ECG performed.  Pt. took her beta blocker this AM.    PE: Gen: AOX3, NAD        COR: RRR. S1S2 distant        Chest: CTA b/l    Labs:   12-12    139  |  104  |  18.7  ----------------------------<  103<H>                                  4.1   |  22.0  |  0.71    Ca    8.8      12 Dec 2023 13:50  Phos  4.0     12-12  Mg     1.7     12-12                        12.1   7.75  )-----------( 211      ( 12 Dec 2023 13:50 )             38.9   12-Lead ECG: NSR at 87 .  No morphology changes from pre-op ECG    A/P 82 y/o woman s/p left TCAR with run of asymptomatic vtach.  Hypomagnesemia. No ECG changes, no further ectopy.  -replace Mag  -consider IV lopressor if ectopic beats recur.  -no further intervention at this time     Called to bedside by RN for Pt. with 10 beat run of non sustained V-tach seen on monitor.  Pt. asymptomatic, offers no complaints.  /54, HR-88.  Labs sent, 12-lead ECG performed.  Pt. took her beta blocker this AM.    PE: Gen: AOX3, NAD        COR: RRR. S1S2 distant        Chest: CTA b/l    Labs:   12-12    139  |  104  |  18.7  ----------------------------<  103<H>                                  4.1   |  22.0  |  0.71    Ca    8.8      12 Dec 2023 13:50  Phos  4.0     12-12  Mg     1.7     12-12                        12.1   7.75  )-----------( 211      ( 12 Dec 2023 13:50 )             38.9   12-Lead ECG: NSR at 87 .  No morphology changes from pre-op ECG    A/P 84 y/o woman s/p left TCAR with run of asymptomatic vtach.  Hypomagnesemia. No ECG changes, no further ectopy.  -replace Mag  -consider IV lopressor if ectopic beats recur.  -no further intervention at this time

## 2023-12-12 NOTE — DISCHARGE NOTE NURSING/CASE MANAGEMENT/SOCIAL WORK - PATIENT PORTAL LINK FT
You can access the FollowMyHealth Patient Portal offered by [facility] by registering at the following website: [email or web address]. By joining [facility]’s FollowMyHealth portal, you will also be able to view your health information using other applications (apps) compatible with our system. You can access the FollowMyHealth Patient Portal offered by [facility] by registering at the following website: [email or web address]. By joining [facility]’s FollowMyHealth portal, you will also be able to view your health information using other applications (apps) compatible with our system.

## 2023-12-12 NOTE — DISCHARGE NOTE NURSING/CASE MANAGEMENT/SOCIAL WORK - NSDCPEFALRISK_GEN_ALL_CORE
For information on Fall & Injury Prevention, visit: [email or web address] OR  [email or web address] OR  https://www.cdc.gov/steadi/patient.html For information on Fall & Injury Prevention, visit: [email or web address] OR  [email or web address] OR  https://www.cdc.gov/steadi/patient.html

## 2023-12-12 NOTE — PATIENT PROFILE ADULT - FALL HARM RISK - HARM RISK INTERVENTIONS
Assistance with ambulation/Assistance OOB with selected safe patient handling equipment/Communicate Risk of Fall with Harm to all staff/Discuss with provider need for PT consult/Monitor gait and stability/Provide patient with walking aids - walker, cane, crutches/Reinforce activity limits and safety measures with patient and family/Sit up slowly, dangle for a short time, stand at bedside before walking/Tailored Fall Risk Interventions/Use of alarms - bed, chair and/or voice tab/Visual Cue: Yellow wristband and red socks/Bed in lowest position, wheels locked, appropriate side rails in place/Call bell, personal items and telephone in reach/Instruct patient to call for assistance before getting out of bed or chair/Non-slip footwear when patient is out of bed/[place] to call system/Physically safe environment - no spills, clutter or unnecessary equipment/Purposeful Proactive Rounding/Room/bathroom lighting operational, light cord in reach Assistance with ambulation/Assistance OOB with selected safe patient handling equipment/Communicate Risk of Fall with Harm to all staff/Discuss with provider need for PT consult/Monitor gait and stability/Provide patient with walking aids - walker, cane, crutches/Reinforce activity limits and safety measures with patient and family/Sit up slowly, dangle for a short time, stand at bedside before walking/Tailored Fall Risk Interventions/Use of alarms - bed, chair and/or voice tab/Visual Cue: Yellow wristband and red socks/Bed in lowest position, wheels locked, appropriate side rails in place/Call bell, personal items and telephone in reach/Instruct patient to call for assistance before getting out of bed or chair/Non-slip footwear when patient is out of bed/[place] to call system/Physically safe environment - no spills, clutter or unnecessary equipment/Purposeful Proactive Rounding/Room/bathroom lighting operational, light cord in reach

## 2023-12-12 NOTE — BRIEF OPERATIVE NOTE - OPERATION/FINDINGS
Deployment of left ICA stent, trans carotid via flow reversal.  Right CVF accessed. NO acute events intraoperatively, post operatively no acute neurological deficits

## 2023-12-13 ENCOUNTER — TRANSCRIPTION ENCOUNTER (OUTPATIENT)
Age: 83
End: 2023-12-13

## 2023-12-13 VITALS
SYSTOLIC BLOOD PRESSURE: 138 MMHG | HEART RATE: 97 BPM | TEMPERATURE: 99 F | RESPIRATION RATE: 19 BRPM | OXYGEN SATURATION: 94 % | DIASTOLIC BLOOD PRESSURE: 76 MMHG

## 2023-12-13 LAB
ANION GAP SERPL CALC-SCNC: 9 MMOL/L — SIGNIFICANT CHANGE UP (ref 5–17)
ANION GAP SERPL CALC-SCNC: 9 MMOL/L — SIGNIFICANT CHANGE UP (ref 5–17)
APPEARANCE UR: CLEAR — SIGNIFICANT CHANGE UP
APPEARANCE UR: CLEAR — SIGNIFICANT CHANGE UP
BILIRUB UR-MCNC: NEGATIVE — SIGNIFICANT CHANGE UP
BILIRUB UR-MCNC: NEGATIVE — SIGNIFICANT CHANGE UP
BUN SERPL-MCNC: 13.4 MG/DL — SIGNIFICANT CHANGE UP (ref 8–20)
BUN SERPL-MCNC: 13.4 MG/DL — SIGNIFICANT CHANGE UP (ref 8–20)
CALCIUM SERPL-MCNC: 8.4 MG/DL — SIGNIFICANT CHANGE UP (ref 8.4–10.5)
CALCIUM SERPL-MCNC: 8.4 MG/DL — SIGNIFICANT CHANGE UP (ref 8.4–10.5)
CHLORIDE SERPL-SCNC: 107 MMOL/L — SIGNIFICANT CHANGE UP (ref 96–108)
CHLORIDE SERPL-SCNC: 107 MMOL/L — SIGNIFICANT CHANGE UP (ref 96–108)
CHOLEST SERPL-MCNC: 126 MG/DL — SIGNIFICANT CHANGE UP
CHOLEST SERPL-MCNC: 126 MG/DL — SIGNIFICANT CHANGE UP
CO2 SERPL-SCNC: 25 MMOL/L — SIGNIFICANT CHANGE UP (ref 22–29)
CO2 SERPL-SCNC: 25 MMOL/L — SIGNIFICANT CHANGE UP (ref 22–29)
COLOR SPEC: YELLOW — SIGNIFICANT CHANGE UP
COLOR SPEC: YELLOW — SIGNIFICANT CHANGE UP
CREAT SERPL-MCNC: 0.78 MG/DL — SIGNIFICANT CHANGE UP (ref 0.5–1.3)
CREAT SERPL-MCNC: 0.78 MG/DL — SIGNIFICANT CHANGE UP (ref 0.5–1.3)
DIFF PNL FLD: NEGATIVE — SIGNIFICANT CHANGE UP
DIFF PNL FLD: NEGATIVE — SIGNIFICANT CHANGE UP
EGFR: 76 ML/MIN/1.73M2 — SIGNIFICANT CHANGE UP
EGFR: 76 ML/MIN/1.73M2 — SIGNIFICANT CHANGE UP
GLUCOSE SERPL-MCNC: 112 MG/DL — HIGH (ref 70–99)
GLUCOSE SERPL-MCNC: 112 MG/DL — HIGH (ref 70–99)
GLUCOSE UR QL: NEGATIVE MG/DL — SIGNIFICANT CHANGE UP
GLUCOSE UR QL: NEGATIVE MG/DL — SIGNIFICANT CHANGE UP
HCT VFR BLD CALC: 38.3 % — SIGNIFICANT CHANGE UP (ref 34.5–45)
HCT VFR BLD CALC: 38.3 % — SIGNIFICANT CHANGE UP (ref 34.5–45)
HDLC SERPL-MCNC: 33 MG/DL — LOW
HDLC SERPL-MCNC: 33 MG/DL — LOW
HGB BLD-MCNC: 12 G/DL — SIGNIFICANT CHANGE UP (ref 11.5–15.5)
HGB BLD-MCNC: 12 G/DL — SIGNIFICANT CHANGE UP (ref 11.5–15.5)
KETONES UR-MCNC: NEGATIVE MG/DL — SIGNIFICANT CHANGE UP
KETONES UR-MCNC: NEGATIVE MG/DL — SIGNIFICANT CHANGE UP
LEUKOCYTE ESTERASE UR-ACNC: NEGATIVE — SIGNIFICANT CHANGE UP
LEUKOCYTE ESTERASE UR-ACNC: NEGATIVE — SIGNIFICANT CHANGE UP
LIPID PNL WITH DIRECT LDL SERPL: 51 MG/DL — SIGNIFICANT CHANGE UP
LIPID PNL WITH DIRECT LDL SERPL: 51 MG/DL — SIGNIFICANT CHANGE UP
MCHC RBC-ENTMCNC: 26 PG — LOW (ref 27–34)
MCHC RBC-ENTMCNC: 26 PG — LOW (ref 27–34)
MCHC RBC-ENTMCNC: 31.3 GM/DL — LOW (ref 32–36)
MCHC RBC-ENTMCNC: 31.3 GM/DL — LOW (ref 32–36)
MCV RBC AUTO: 82.9 FL — SIGNIFICANT CHANGE UP (ref 80–100)
MCV RBC AUTO: 82.9 FL — SIGNIFICANT CHANGE UP (ref 80–100)
NITRITE UR-MCNC: NEGATIVE — SIGNIFICANT CHANGE UP
NITRITE UR-MCNC: NEGATIVE — SIGNIFICANT CHANGE UP
NON HDL CHOLESTEROL: 93 MG/DL — SIGNIFICANT CHANGE UP
NON HDL CHOLESTEROL: 93 MG/DL — SIGNIFICANT CHANGE UP
PH UR: 7.5 — SIGNIFICANT CHANGE UP (ref 5–8)
PH UR: 7.5 — SIGNIFICANT CHANGE UP (ref 5–8)
PLATELET # BLD AUTO: 174 K/UL — SIGNIFICANT CHANGE UP (ref 150–400)
PLATELET # BLD AUTO: 174 K/UL — SIGNIFICANT CHANGE UP (ref 150–400)
POTASSIUM SERPL-MCNC: 3.9 MMOL/L — SIGNIFICANT CHANGE UP (ref 3.5–5.3)
POTASSIUM SERPL-MCNC: 3.9 MMOL/L — SIGNIFICANT CHANGE UP (ref 3.5–5.3)
POTASSIUM SERPL-SCNC: 3.9 MMOL/L — SIGNIFICANT CHANGE UP (ref 3.5–5.3)
POTASSIUM SERPL-SCNC: 3.9 MMOL/L — SIGNIFICANT CHANGE UP (ref 3.5–5.3)
PROT UR-MCNC: NEGATIVE MG/DL — SIGNIFICANT CHANGE UP
PROT UR-MCNC: NEGATIVE MG/DL — SIGNIFICANT CHANGE UP
RBC # BLD: 4.62 M/UL — SIGNIFICANT CHANGE UP (ref 3.8–5.2)
RBC # BLD: 4.62 M/UL — SIGNIFICANT CHANGE UP (ref 3.8–5.2)
RBC # FLD: 14.3 % — SIGNIFICANT CHANGE UP (ref 10.3–14.5)
RBC # FLD: 14.3 % — SIGNIFICANT CHANGE UP (ref 10.3–14.5)
SODIUM SERPL-SCNC: 141 MMOL/L — SIGNIFICANT CHANGE UP (ref 135–145)
SODIUM SERPL-SCNC: 141 MMOL/L — SIGNIFICANT CHANGE UP (ref 135–145)
SP GR SPEC: 1.01 — SIGNIFICANT CHANGE UP (ref 1–1.03)
SP GR SPEC: 1.01 — SIGNIFICANT CHANGE UP (ref 1–1.03)
TRIGL SERPL-MCNC: 208 MG/DL — HIGH
TRIGL SERPL-MCNC: 208 MG/DL — HIGH
UROBILINOGEN FLD QL: 0.2 MG/DL — SIGNIFICANT CHANGE UP (ref 0.2–1)
UROBILINOGEN FLD QL: 0.2 MG/DL — SIGNIFICANT CHANGE UP (ref 0.2–1)
WBC # BLD: 5.21 K/UL — SIGNIFICANT CHANGE UP (ref 3.8–10.5)
WBC # BLD: 5.21 K/UL — SIGNIFICANT CHANGE UP (ref 3.8–10.5)
WBC # FLD AUTO: 5.21 K/UL — SIGNIFICANT CHANGE UP (ref 3.8–10.5)
WBC # FLD AUTO: 5.21 K/UL — SIGNIFICANT CHANGE UP (ref 3.8–10.5)

## 2023-12-13 PROCEDURE — C1889: CPT

## 2023-12-13 PROCEDURE — 81003 URINALYSIS AUTO W/O SCOPE: CPT

## 2023-12-13 PROCEDURE — 86901 BLOOD TYPING SEROLOGIC RH(D): CPT

## 2023-12-13 PROCEDURE — 84132 ASSAY OF SERUM POTASSIUM: CPT

## 2023-12-13 PROCEDURE — C1876: CPT

## 2023-12-13 PROCEDURE — 76000 FLUOROSCOPY <1 HR PHYS/QHP: CPT

## 2023-12-13 PROCEDURE — 36415 COLL VENOUS BLD VENIPUNCTURE: CPT

## 2023-12-13 PROCEDURE — 80048 BASIC METABOLIC PNL TOTAL CA: CPT

## 2023-12-13 PROCEDURE — 70450 CT HEAD/BRAIN W/O DYE: CPT

## 2023-12-13 PROCEDURE — C1769: CPT

## 2023-12-13 PROCEDURE — 83735 ASSAY OF MAGNESIUM: CPT

## 2023-12-13 PROCEDURE — 84100 ASSAY OF PHOSPHORUS: CPT

## 2023-12-13 PROCEDURE — 86900 BLOOD TYPING SEROLOGIC ABO: CPT

## 2023-12-13 PROCEDURE — C1884: CPT

## 2023-12-13 PROCEDURE — 70450 CT HEAD/BRAIN W/O DYE: CPT | Mod: 26

## 2023-12-13 PROCEDURE — C1725: CPT

## 2023-12-13 PROCEDURE — 93005 ELECTROCARDIOGRAM TRACING: CPT

## 2023-12-13 PROCEDURE — 85027 COMPLETE CBC AUTOMATED: CPT

## 2023-12-13 PROCEDURE — 86850 RBC ANTIBODY SCREEN: CPT

## 2023-12-13 PROCEDURE — 80061 LIPID PANEL: CPT

## 2023-12-13 PROCEDURE — C1894: CPT

## 2023-12-13 RX ORDER — AMLODIPINE BESYLATE 2.5 MG/1
5 TABLET ORAL DAILY
Refills: 0 | Status: DISCONTINUED | OUTPATIENT
Start: 2023-12-13 | End: 2023-12-13

## 2023-12-13 RX ORDER — OXYCODONE AND ACETAMINOPHEN 5; 325 MG/1; MG/1
1 TABLET ORAL
Qty: 15 | Refills: 0
Start: 2023-12-13

## 2023-12-13 RX ORDER — LISINOPRIL 2.5 MG/1
40 TABLET ORAL DAILY
Refills: 0 | Status: DISCONTINUED | OUTPATIENT
Start: 2023-12-13 | End: 2023-12-13

## 2023-12-13 RX ADMIN — Medication 30 MILLIGRAM(S): at 01:00

## 2023-12-13 RX ADMIN — CLOPIDOGREL BISULFATE 75 MILLIGRAM(S): 75 TABLET, FILM COATED ORAL at 12:58

## 2023-12-13 RX ADMIN — Medication 30 MILLIGRAM(S): at 05:39

## 2023-12-13 RX ADMIN — Medication 81 MILLIGRAM(S): at 12:58

## 2023-12-13 RX ADMIN — Medication 1 TABLET(S): at 12:58

## 2023-12-13 RX ADMIN — Medication 30 MILLIGRAM(S): at 07:00

## 2023-12-13 RX ADMIN — Medication 2000 MILLIGRAM(S): at 01:10

## 2023-12-13 RX ADMIN — LISINOPRIL 40 MILLIGRAM(S): 2.5 TABLET ORAL at 09:42

## 2023-12-13 RX ADMIN — AMLODIPINE BESYLATE 5 MILLIGRAM(S): 2.5 TABLET ORAL at 09:42

## 2023-12-13 NOTE — DISCHARGE NOTE PROVIDER - CARE PROVIDERS DIRECT ADDRESSES
[email or web address]

## 2023-12-13 NOTE — PHYSICAL THERAPY INITIAL EVALUATION ADULT - ADDITIONAL COMMENTS
Patient lives in a house with 5+4 BRITTANY with bilateral railings. She lives with her son who works/is unable to provide assist at times. She was independent prior to admission with use of cane or RW for ambulation. No other DME noted.

## 2023-12-13 NOTE — DISCHARGE NOTE PROVIDER - CARE PROVIDER_API CALL
Manvar-Singh, Pallavi Buddhadev  Vascular Surgery  [address]  [place] [address]  Phone: [phone]  Fax: [phone]  Established Patient  Follow Up Time: 2 weeks   Manvar-Singh, Pallavi Buddhadev  Vascular Surgery  [address]  [place] [address]  Phone: [phone]  Fax: [phone]  Established Patient  Follow Up Time: 2 weeks

## 2023-12-13 NOTE — PHYSICAL THERAPY INITIAL EVALUATION ADULT - GENERAL OBSERVATIONS, REHAB EVAL
Patient received lying in bed, NAD, breathing RA, +tele, +purewick. Pt agreeable to Physical Therapy evaluation.

## 2023-12-13 NOTE — DISCHARGE NOTE PROVIDER - NSDCMRMEDTOKEN_GEN_ALL_CORE_FT
ALPRAZolam 1 mg oral tablet: 1 tab(s) orally 2 times a day, As Needed  amLODIPine 5 mg oral tablet: 1 tab(s) orally once a day  aspirin 81 mg oral delayed release tablet: 1 tab(s) orally once a day  atorvastatin 40 mg oral tablet: 1 tab(s) orally once a day  Biotin 10 mg oral tablet: 1 tab(s) orally once a day  lisinopril 40 mg oral tablet: 1 tab(s) orally once a day  metoprolol succinate 100 mg oral capsule, extended release: 1 cap(s) orally once a day  Multiple Vitamins oral capsule: 1 cap(s) orally once a day  Plavix 75 mg oral tablet: 1 tab(s) orally once a day   ALPRAZolam 1 mg oral tablet: 1 tab(s) orally 2 times a day, As Needed  amLODIPine 5 mg oral tablet: 1 tab(s) orally once a day  aspirin 81 mg oral delayed release tablet: 1 tab(s) orally once a day  atorvastatin 40 mg oral tablet: 1 tab(s) orally once a day  Biotin 10 mg oral tablet: 1 tab(s) orally once a day  lisinopril 40 mg oral tablet: 1 tab(s) orally once a day  metoprolol succinate 100 mg oral capsule, extended release: 1 cap(s) orally once a day  Multiple Vitamins oral capsule: 1 cap(s) orally once a day  Percocet 5 mg-325 mg oral tablet: 1 tab(s) orally every 6 hours as needed for  moderate pain DO NOT COMBINE WITH TYLENOL MDD: 4  Plavix 75 mg oral tablet: 1 tab(s) orally once a day

## 2023-12-13 NOTE — PROGRESS NOTE ADULT - SUBJECTIVE AND OBJECTIVE BOX
SURGERY    STATUS POST:  Lt TCAR    POST OPERATIVE DAY #1      INTERVAL EVENTS/SUBJECTIVE:   No acute events overnight. Experiencing moderate amounts of pain this morning but well controlled on pain medication.    ______________________________________________  OBJECTIVE:   T(C): 36.6 (12-13-23 @ 04:12), Max: 37.1 (12-12-23 @ 19:00)  HR: 87 (12-13-23 @ 04:12) (72 - 95)  BP: 153/73 (12-13-23 @ 04:12) (92/47 - 157/80)  RR: 18 (12-13-23 @ 04:12) (14 - 20)  SpO2: 95% (12-13-23 @ 04:12) (93% - 98%)  Wt(kg): --  CAPILLARY BLOOD GLUCOSE        I&O's Detail    12 Dec 2023 07:01  -  13 Dec 2023 07:00  --------------------------------------------------------  IN:    IV PiggyBack: 100 mL    Lactated Ringers: 300 mL    Lactated Ringers Bolus: 1500 mL    Oral Fluid: 250 mL    Phenylephrine: 111 mL    sodium chloride 0.9%: 300 mL  Total IN: 2561 mL    OUT:    Voided (mL): 400 mL  Total OUT: 400 mL    Total NET: 2161 mL      13 Dec 2023 07:01  -  13 Dec 2023 08:02  --------------------------------------------------------  IN:  Total IN: 0 mL    OUT:    Voided (mL): 1100 mL  Total OUT: 1100 mL    Total NET: -1100 mL          Physical exam:  Gen: resting in bed comfortably in NAD  Chest: no increased WOB, regular inspiratory effort   Vascular: WWP, BRITO x4, Rt groin access site is soft and clean, palpable radial and DP pulses B/L  NEURO: awake, alert, no gross CN deficits  ______________________________________________  LABS:  CBC Full  -  ( 13 Dec 2023 04:40 )  WBC Count : 5.21 K/uL  RBC Count : 4.62 M/uL  Hemoglobin : 12.0 g/dL  Hematocrit : 38.3 %  Platelet Count - Automated : 174 K/uL  Mean Cell Volume : 82.9 fl  Mean Cell Hemoglobin : 26.0 pg  Mean Cell Hemoglobin Concentration : 31.3 gm/dL  Auto Neutrophil # : x  Auto Lymphocyte # : x  Auto Monocyte # : x  Auto Eosinophil # : x  Auto Basophil # : x  Auto Neutrophil % : x  Auto Lymphocyte % : x  Auto Monocyte % : x  Auto Eosinophil % : x  Auto Basophil % : x    12-13    141  |  107  |  13.4  ----------------------------<  112<H>  3.9   |  25.0  |  0.78    Ca    8.4      13 Dec 2023 04:40  Phos  4.0     12-12  Mg     1.7     12-12

## 2023-12-13 NOTE — PROGRESS NOTE ADULT - ASSESSMENT
82 yo F POD1 s/p Lt TCAR for carotid stenosis progressing well postoperatively. She does not have any gross CN deficits and is hemodynamically stable.    Plan  - DC home today after seen by Dr. Romeo  - Multimodal pain control, avoiding opiates  - ASA and plavix  - DASH diet w IVF  - Ambulate as tolerated 84 yo F POD1 s/p Lt TCAR for carotid stenosis progressing well postoperatively. She does not have any gross CN deficits and is hemodynamically stable.    Plan  - DC home today after seen by Dr. Romeo  - Multimodal pain control, avoiding opiates  - ASA and plavix  - DASH diet w IVF  - Ambulate as tolerated

## 2023-12-13 NOTE — DISCHARGE NOTE PROVIDER - NSDCFUSCHEDAPPT_GEN_ALL_CORE_FT
[facility]  HNT PreAdmits  Scheduled Appointment: 12/26/2023    [facility]  HNT PreAdmits  Scheduled Appointment: 01/03/2024    [facility]  ORTHOSURG AIRAM Weaver  Scheduled Appointment: 01/03/2024     [facility]  HNT PreAdmits  Scheduled Appointment: 12/26/2023    [facility]  HNT PreAdmits  Scheduled Appointment: 01/03/2024    [facility]  ORTHOSURG AIRAM Weaver  Scheduled Appointment: 01/03/2024

## 2023-12-13 NOTE — DISCHARGE NOTE PROVIDER - HOSPITAL COURSE
Patient is returning a missed call from care team, please call back at number listed.    Ms. Ram presented on 12/12/23 for an elective left transcarotid artery revascularization (TCAR) with Dr. Romeo. Her procedure was successful and her post operative course was uncomplicated. She is hemodynamically stable and her cranial nerves are grossly intact. There are no signs of hematoma at the right groin access site. Ms. Ram presented on 12/12/23 for an elective left transcarotid artery revascularization (TCAR) with Dr. Romeo. Her procedure was uncomplicated.  On POD1 she was experiencing left sided headache and a noncontrast head CT was ordered to rule out hyperperfusion.  Her headache resolved the same morning, and her head CT was negative. She is hemodynamically stable and her cranial nerves are grossly intact. There are no signs of hematoma at the right groin access site.  She is medically optimized for DC home with home PT, per PT's recommendations.

## 2023-12-13 NOTE — DISCHARGE NOTE PROVIDER - NSDCFUADDINST_GEN_ALL_CORE_FT
No heavy lifting (greater than 15 lbs) for two weeks.  May shower at home  Do not submerge in water, such as a bath, for two weeks  Follow up with Dr. Romeo in her office in two weeks.

## 2023-12-13 NOTE — DISCHARGE NOTE PROVIDER - NSDCCPCAREPLAN_GEN_ALL_CORE_FT
PRINCIPAL DISCHARGE DIAGNOSIS  Diagnosis: Carotid artery stenosis, unilateral, left  Assessment and Plan of Treatment:

## 2023-12-18 ENCOUNTER — NON-APPOINTMENT (OUTPATIENT)
Age: 83
End: 2023-12-18

## 2023-12-22 ENCOUNTER — APPOINTMENT (OUTPATIENT)
Dept: CT IMAGING | Facility: CLINIC | Age: 83
End: 2023-12-22
Payer: MEDICARE

## 2023-12-22 ENCOUNTER — OUTPATIENT (OUTPATIENT)
Dept: OUTPATIENT SERVICES | Facility: HOSPITAL | Age: 83
LOS: 1 days | End: 2023-12-22
Payer: MEDICARE

## 2023-12-22 ENCOUNTER — RX RENEWAL (OUTPATIENT)
Age: 83
End: 2023-12-22

## 2023-12-22 DIAGNOSIS — K62.89 OTHER SPECIFIED DISEASES OF ANUS AND RECTUM: Chronic | ICD-10-CM

## 2023-12-22 DIAGNOSIS — Z95.1 PRESENCE OF AORTOCORONARY BYPASS GRAFT: Chronic | ICD-10-CM

## 2023-12-22 DIAGNOSIS — Z98.49 CATARACT EXTRACTION STATUS, UNSPECIFIED EYE: Chronic | ICD-10-CM

## 2023-12-22 DIAGNOSIS — Z00.8 ENCOUNTER FOR OTHER GENERAL EXAMINATION: ICD-10-CM

## 2023-12-22 DIAGNOSIS — C34.90 MALIGNANT NEOPLASM OF UNSPECIFIED PART OF UNSPECIFIED BRONCHUS OR LUNG: ICD-10-CM

## 2023-12-22 PROCEDURE — 71250 CT THORAX DX C-: CPT | Mod: 26

## 2023-12-22 PROCEDURE — 71250 CT THORAX DX C-: CPT

## 2023-12-28 ENCOUNTER — RX RENEWAL (OUTPATIENT)
Age: 83
End: 2023-12-28

## 2024-01-03 ENCOUNTER — APPOINTMENT (OUTPATIENT)
Dept: ORTHOPEDIC SURGERY | Facility: HOSPITAL | Age: 84
End: 2024-01-03

## 2024-01-04 ENCOUNTER — APPOINTMENT (OUTPATIENT)
Dept: RADIATION ONCOLOGY | Facility: CLINIC | Age: 84
End: 2024-01-04
Payer: MEDICARE

## 2024-01-04 PROCEDURE — 99442: CPT

## 2024-01-04 NOTE — REASON FOR VISIT
[Home] : at home, [unfilled] , at the time of the visit. [Verbal consent obtained from patient] : the patient, [unfilled] [Post-Treatment Evaluation] : post-treatment evaluation for [Lung Cancer] : lung cancer [Medical Office: ([facility])___] : at the medical office located in  [Routine Follow-Up] : routine follow-up visit for

## 2024-01-04 NOTE — HISTORY OF PRESENT ILLNESS
[FreeTextEntry1] : This is an 83 year old female diagnosed with lung cancer in May 2023 referred by Dr. Elam.   She presented to [facility] in May 2023 s/p fall.   Inpatient CT A/P performed on 5/22/23 showed bilateral pulmonary nodules with the largest measuring 2.4 x 2.4 x 3.3 cm in the right lower lung. Right hilar lymphadenopathy and mediastinal lymphadenopathy concerning for primary pulmonary malignancy.   Brain MRI was negative as per note.   CT guided lung biopsy of right lung nodule performed on 5/26/23 showed adenocarcinoma with cribriform and acinar patterns.   PET scan performed on 6/19/23 showed FDG avid right pulmonary nodule, measuring 2.4 x 2.1 cm, consistent with known malignancy. Bilateral upper lobe pulmonary nodules below resolution of PET. No distant mets.   She had a consultation with [facility] and was recommended to undergo Cyberknife treatment.   She had a consultation with Dr. Elam on 8/1/23 who has referred her for consderation of SBRT.   She had a consultation with Dr. Paz on 8/9/23 who offered a right VATS, robotic assisted right lower lobe wedge resection.  Sharmin is unsure, however, if she wants to proceed with surgery.  She reports SOB on exertion, occasional caught, denies productive cough.  Her main complaints are of back pain and bilateral knee pain from DJD.  She was assessed to have a newly diagnosed adenoca of Right lung, stage IA, [id number]. Patient presented with an asymptomatic right lung nodule, progressively enlarging, FDG avid, and biopsy proven to be malignant. She was offered VATS wedge resection but is reluctant to undergo surgery. Sharmin prefers a nonoperative approach.  She completed SBRT to the right lung, total dose of 5000 cGy in 5 fx, on 9/21/23.   CT Chest performed on 12/22/23 showed a 1.5 cm solid nodule within superior segment of right lower lobe is decrease, previously 2.3 cm. A 0.9 cm nodule within right lower lobe is new from prior exam. Multiple other bilateral ground-glass and solid nodules are unchanged.  She presents for a 3 month follow up. Feeling well. Slight SOB and coughing noted. She saw Dr. Newman in November 2023.  To undergo knee replacement soon.

## 2024-01-15 ENCOUNTER — RX RENEWAL (OUTPATIENT)
Age: 84
End: 2024-01-15

## 2024-01-19 NOTE — H&P PST ADULT - CIGARETTES, NUMBER OF YRS
Discharge Instructions      Patient Instructions:   Home  Therapy orders: PT, OT, SLP, and nursing     Discharge lab work: none  Code status: Full Code   Activity: activity as tolerated  Diet: ADULT DIET; Regular; 4 carb choices (60 gm/meal)  ADULT ORAL NUTRITION SUPPLEMENT; Breakfast, Dinner; Diabetic Oral Supplement    Wound Care: as directed  Equipment: as per therapy  
40

## 2024-01-25 ENCOUNTER — APPOINTMENT (OUTPATIENT)
Dept: ORTHOPEDIC SURGERY | Facility: CLINIC | Age: 84
End: 2024-01-25

## 2024-01-26 ENCOUNTER — APPOINTMENT (OUTPATIENT)
Dept: VASCULAR SURGERY | Facility: CLINIC | Age: 84
End: 2024-01-26
Payer: MEDICARE

## 2024-01-26 VITALS
TEMPERATURE: 98.2 F | RESPIRATION RATE: 16 BRPM | HEART RATE: 99 BPM | DIASTOLIC BLOOD PRESSURE: 78 MMHG | SYSTOLIC BLOOD PRESSURE: 129 MMHG | OXYGEN SATURATION: 95 % | HEIGHT: 66 IN

## 2024-01-26 VITALS — DIASTOLIC BLOOD PRESSURE: 75 MMHG | SYSTOLIC BLOOD PRESSURE: 121 MMHG

## 2024-01-26 DIAGNOSIS — I65.29 OCCLUSION AND STENOSIS OF UNSPECIFIED CAROTID ARTERY: ICD-10-CM

## 2024-01-26 PROCEDURE — 99213 OFFICE O/P EST LOW 20 MIN: CPT | Mod: 24

## 2024-01-26 PROCEDURE — 93882 EXTRACRANIAL UNI/LTD STUDY: CPT

## 2024-01-30 ENCOUNTER — APPOINTMENT (OUTPATIENT)
Dept: ORTHOPEDIC SURGERY | Facility: CLINIC | Age: 84
End: 2024-01-30
Payer: MEDICARE

## 2024-01-30 VITALS
DIASTOLIC BLOOD PRESSURE: 80 MMHG | BODY MASS INDEX: 27.32 KG/M2 | WEIGHT: 170 LBS | HEART RATE: 101 BPM | SYSTOLIC BLOOD PRESSURE: 134 MMHG | HEIGHT: 66 IN

## 2024-01-30 PROCEDURE — 73564 X-RAY EXAM KNEE 4 OR MORE: CPT | Mod: RT

## 2024-01-30 PROCEDURE — 99215 OFFICE O/P EST HI 40 MIN: CPT

## 2024-01-30 NOTE — ADDENDUM
[FreeTextEntry1] : This note was authored by Dax Deleon working as a medical scribe for Dr. Lul Díaz. The note was reviewed, edited, and revised by Dr. Lul Díaz whom is in agreement with the exam findings, imaging findings, and treatment plan. 01/30/2024

## 2024-01-30 NOTE — HISTORY OF PRESENT ILLNESS
[de-identified] : Patient is an 83-year-old female presenting for evaluation of longstanding right knee pain now worsening.  Her knee pain is localized medially and anteriorly.  Pain is worse with weightbearing activity including walking long distance and rising from a seated position.  She has a history of osteoarthritis is been treated conservatively thus far.  She had gel shots as well as cortisone injections in the past none within the past 3 months.  Unfortunately these did not work.  Patient has tried physical therapy and anti-inflammatories without relief.  She presents today in wheelchair however mostly ambulates with a cane.   Past medical history includes triple bypass 5 years ago, now on clopidogrel.

## 2024-01-30 NOTE — CONSULT LETTER
[Dear  ___] : Dear  [unfilled], [Consult Letter:] : I had the pleasure of evaluating your patient, [unfilled]. [Please see my note below.] : Please see my note below. [Consult Closing:] : Thank you very much for allowing me to participate in the care of this patient.  If you have any questions, please do not hesitate to contact me. [Sincerely,] : Sincerely, [FreeTextEntry3] : Lul Díaz MD Chief of Joint Replacement Primary & Revision Hip and Knee Replacement  [facility] [place]

## 2024-01-30 NOTE — PHYSICAL EXAM
[de-identified] : The patient appears well nourished and in no apparent distress.  The patient is alert and oriented to person, place, and time.   Affect and mood appear normal. The head is normocephalic and atraumatic.  The eyes reveal normal sclera and extra ocular muscles are intact. The tongue is midline with no apparent lesions.  Skin shows normal turgor with no evidence of eczema or psoriasis.  No respiratory distress noted.  Sensation grossly intact.		  [de-identified] : Exam of the right knee shows a varus alignment which is correctable, -5 to 125 degrees of flexion measured with a goniometer.  5/5 motor strength bilaterally distally. Sensation intact distally.		  [de-identified] : X-ray: 4 views of the right knee demonstrate bone on bone varus arthritis.

## 2024-01-30 NOTE — DISCUSSION/SUMMARY
[de-identified] : SIMEON CABRAL is a 83 year old female who presents with right knee bone on bone varus arthritis. The patient has exhausted a minimum of 3 months conservative treatment including prior injections, physical therapy, over the counter NSIADS and pain medication where indicated. In addition, the patient's quality of life is diminished due to significant chronic pain. The patient is having difficulty ambulating, descending stairs, and rising from a seated position.   Based upon the patient's continued symptoms and failure to respond to conservative treatment, I have recommended a right total knee replacement for this patient. A long discussion took place with the patient describing what a total joint replacement is and what the procedure would entail. A knee model, similar to the implants that will be used during the operation, was utilized to demonstrate the implants. Choices of implant manufactures were discussed and reviewed. The ability to secure the implant utilizing cement or cementless (press fit) fixation was discussed. The patient agrees with the plan of care as well as the use of implants.   The hospitalization and post-operative care and rehabilitation were also discussed. The use of perioperative antibiotics and DVT prophylaxis were discussed. The risk, benefits and alternatives to a surgical intervention were discussed at length with the patient. The patient was also advised of risks related to the medical comorbidities and elevated body mass index (BMI). A lengthy discussion took place to review the most common complications including but not limited to: deep vein thrombosis, pulmonary embolus, heart attack, stroke, infection, wound breakdown, numbness, damage to nerves, tendon, muscles, arteries or other blood vessels, death and other possible complications from anesthesia. The patient was told that we will take steps to minimize these risks by using sterile technique, antibiotics and DVT prophylaxis when appropriate and follow the patient postoperatively in the office setting to monitor progress. The possibility of recurrent pain, no improvement in pain and actual worsening of pain were also discussed with the patient.   The discharge plan of care focused on the patient going home following surgery. The patient was encouraged to make the necessary arrangements to have someone stay with them when they are discharged home. Following discharge, a home care nurse will visit the patient. The home care nurse will open your home care case and request home physical therapy services. Home physical therapy will commence following discharge provided it is appropriate and covered by the health insurance benefit plan.   The benefits of surgery were discussed with the patient including the potential for improving the patient's current clinical condition through operative intervention. Alternatives to surgical intervention including continued conservative management were also discussed in detail. All questions were answered to the satisfaction of the patient. The treatment plan of care, as well as a model of a total knee equivalent to the one that will be used for their total joint replacement, was shared with the patient. The patient participated and agreed to the plan of care as well as the use of the recommended implants for their total joint replacement surgery.   We discussed that the knee replacement will be done with robotic assistance to enhance accuracy and dynamic joint balancing.	  The patient will obtain cardiac clearance prior to surgery.

## 2024-02-15 ENCOUNTER — OUTPATIENT (OUTPATIENT)
Dept: OUTPATIENT SERVICES | Facility: HOSPITAL | Age: 84
LOS: 1 days | End: 2024-02-15
Payer: MEDICARE

## 2024-02-15 ENCOUNTER — APPOINTMENT (OUTPATIENT)
Dept: CT IMAGING | Facility: CLINIC | Age: 84
End: 2024-02-15
Payer: MEDICARE

## 2024-02-15 DIAGNOSIS — M17.0 BILATERAL PRIMARY OSTEOARTHRITIS OF KNEE: ICD-10-CM

## 2024-02-15 DIAGNOSIS — Z95.1 PRESENCE OF AORTOCORONARY BYPASS GRAFT: Chronic | ICD-10-CM

## 2024-02-15 DIAGNOSIS — Z98.49 CATARACT EXTRACTION STATUS, UNSPECIFIED EYE: Chronic | ICD-10-CM

## 2024-02-15 DIAGNOSIS — K62.89 OTHER SPECIFIED DISEASES OF ANUS AND RECTUM: Chronic | ICD-10-CM

## 2024-02-15 PROCEDURE — 73700 CT LOWER EXTREMITY W/O DYE: CPT

## 2024-02-15 PROCEDURE — 73700 CT LOWER EXTREMITY W/O DYE: CPT | Mod: 26,RT

## 2024-02-22 ENCOUNTER — OUTPATIENT (OUTPATIENT)
Dept: OUTPATIENT SERVICES | Facility: HOSPITAL | Age: 84
LOS: 1 days | End: 2024-02-22
Payer: MEDICARE

## 2024-02-22 VITALS
WEIGHT: 170.2 LBS | OXYGEN SATURATION: 99 % | RESPIRATION RATE: 18 BRPM | TEMPERATURE: 97 F | SYSTOLIC BLOOD PRESSURE: 115 MMHG | HEART RATE: 99 BPM | HEIGHT: 64 IN | DIASTOLIC BLOOD PRESSURE: 65 MMHG

## 2024-02-22 DIAGNOSIS — Z98.49 CATARACT EXTRACTION STATUS, UNSPECIFIED EYE: Chronic | ICD-10-CM

## 2024-02-22 DIAGNOSIS — I10 ESSENTIAL (PRIMARY) HYPERTENSION: ICD-10-CM

## 2024-02-22 DIAGNOSIS — M17.11 UNILATERAL PRIMARY OSTEOARTHRITIS, RIGHT KNEE: ICD-10-CM

## 2024-02-22 DIAGNOSIS — Z13.89 ENCOUNTER FOR SCREENING FOR OTHER DISORDER: ICD-10-CM

## 2024-02-22 DIAGNOSIS — K62.89 OTHER SPECIFIED DISEASES OF ANUS AND RECTUM: Chronic | ICD-10-CM

## 2024-02-22 DIAGNOSIS — Z98.890 OTHER SPECIFIED POSTPROCEDURAL STATES: Chronic | ICD-10-CM

## 2024-02-22 DIAGNOSIS — Z01.818 ENCOUNTER FOR OTHER PREPROCEDURAL EXAMINATION: ICD-10-CM

## 2024-02-22 DIAGNOSIS — Z95.1 PRESENCE OF AORTOCORONARY BYPASS GRAFT: Chronic | ICD-10-CM

## 2024-02-22 DIAGNOSIS — Z29.9 ENCOUNTER FOR PROPHYLACTIC MEASURES, UNSPECIFIED: ICD-10-CM

## 2024-02-22 LAB
A1C WITH ESTIMATED AVERAGE GLUCOSE RESULT: 6.1 % — HIGH (ref 4–5.6)
ANION GAP SERPL CALC-SCNC: 14 MMOL/L — SIGNIFICANT CHANGE UP (ref 5–17)
APTT BLD: 29.3 SEC — SIGNIFICANT CHANGE UP (ref 24.5–35.6)
BASOPHILS # BLD AUTO: 0.06 K/UL — SIGNIFICANT CHANGE UP (ref 0–0.2)
BASOPHILS NFR BLD AUTO: 1 % — SIGNIFICANT CHANGE UP (ref 0–2)
BLD GP AB SCN SERPL QL: SIGNIFICANT CHANGE UP
BUN SERPL-MCNC: 16.6 MG/DL — SIGNIFICANT CHANGE UP (ref 8–20)
CALCIUM SERPL-MCNC: 9.6 MG/DL — SIGNIFICANT CHANGE UP (ref 8.4–10.5)
CHLORIDE SERPL-SCNC: 103 MMOL/L — SIGNIFICANT CHANGE UP (ref 96–108)
CO2 SERPL-SCNC: 23 MMOL/L — SIGNIFICANT CHANGE UP (ref 22–29)
CREAT SERPL-MCNC: 0.82 MG/DL — SIGNIFICANT CHANGE UP (ref 0.5–1.3)
EGFR: 72 ML/MIN/1.73M2 — SIGNIFICANT CHANGE UP
EOSINOPHIL # BLD AUTO: 0.23 K/UL — SIGNIFICANT CHANGE UP (ref 0–0.5)
EOSINOPHIL NFR BLD AUTO: 3.7 % — SIGNIFICANT CHANGE UP (ref 0–6)
ESTIMATED AVERAGE GLUCOSE: 128 MG/DL — HIGH (ref 68–114)
GLUCOSE SERPL-MCNC: 121 MG/DL — HIGH (ref 70–99)
HCT VFR BLD CALC: 43 % — SIGNIFICANT CHANGE UP (ref 34.5–45)
HGB BLD-MCNC: 13.5 G/DL — SIGNIFICANT CHANGE UP (ref 11.5–15.5)
IMM GRANULOCYTES NFR BLD AUTO: 0.3 % — SIGNIFICANT CHANGE UP (ref 0–0.9)
INR BLD: 1.02 RATIO — SIGNIFICANT CHANGE UP (ref 0.85–1.18)
LYMPHOCYTES # BLD AUTO: 1.58 K/UL — SIGNIFICANT CHANGE UP (ref 1–3.3)
LYMPHOCYTES # BLD AUTO: 25.5 % — SIGNIFICANT CHANGE UP (ref 13–44)
MCHC RBC-ENTMCNC: 25.4 PG — LOW (ref 27–34)
MCHC RBC-ENTMCNC: 31.4 GM/DL — LOW (ref 32–36)
MCV RBC AUTO: 81 FL — SIGNIFICANT CHANGE UP (ref 80–100)
MONOCYTES # BLD AUTO: 0.48 K/UL — SIGNIFICANT CHANGE UP (ref 0–0.9)
MONOCYTES NFR BLD AUTO: 7.8 % — SIGNIFICANT CHANGE UP (ref 2–14)
MRSA PCR RESULT.: SIGNIFICANT CHANGE UP
NEUTROPHILS # BLD AUTO: 3.82 K/UL — SIGNIFICANT CHANGE UP (ref 1.8–7.4)
NEUTROPHILS NFR BLD AUTO: 61.7 % — SIGNIFICANT CHANGE UP (ref 43–77)
PLATELET # BLD AUTO: 252 K/UL — SIGNIFICANT CHANGE UP (ref 150–400)
POTASSIUM SERPL-MCNC: 4.1 MMOL/L — SIGNIFICANT CHANGE UP (ref 3.5–5.3)
POTASSIUM SERPL-SCNC: 4.1 MMOL/L — SIGNIFICANT CHANGE UP (ref 3.5–5.3)
PROTHROM AB SERPL-ACNC: 11.3 SEC — SIGNIFICANT CHANGE UP (ref 9.5–13)
RBC # BLD: 5.31 M/UL — HIGH (ref 3.8–5.2)
RBC # FLD: 14.6 % — HIGH (ref 10.3–14.5)
S AUREUS DNA NOSE QL NAA+PROBE: SIGNIFICANT CHANGE UP
SODIUM SERPL-SCNC: 140 MMOL/L — SIGNIFICANT CHANGE UP (ref 135–145)
WBC # BLD: 6.19 K/UL — SIGNIFICANT CHANGE UP (ref 3.8–10.5)
WBC # FLD AUTO: 6.19 K/UL — SIGNIFICANT CHANGE UP (ref 3.8–10.5)

## 2024-02-22 PROCEDURE — G0463: CPT

## 2024-02-22 PROCEDURE — 93010 ELECTROCARDIOGRAM REPORT: CPT

## 2024-02-22 PROCEDURE — 93005 ELECTROCARDIOGRAM TRACING: CPT

## 2024-02-22 NOTE — H&P PST ADULT - ASSESSMENT
This is a       CAPRINI SCORE    AGE RELATED RISK FACTORS                                                             [ ] Age 41-60 years                                            (1 Point)  [ ] Age: 61-74 years                                           (2 Points)                 [ ] Age= 75 years                                                (3 Points)             DISEASE RELATED RISK FACTORS                                                       [ ] Edema in the lower extremities                 (1 Point)                     [ ] Varicose veins                                               (1 Point)                                 [ ] BMI > 25 Kg/m2                                            (1 Point)                                  [ ] Serious infection (ie PNA)                            (1 Point)                     [ ] Lung disease ( COPD, Emphysema)            (1 Point)                                                                          [ ] Acute myocardial infarction                         (1 Point)                  [ ] Congestive heart failure (in the previous month)  (1 Point)         [ ] Inflammatory bowel disease                            (1 Point)                  [ ] Central venous access, PICC or Port               (2 points)       (within the last month)                                                                [ ] Stroke (in the previous month)                        (5 Points)    [ ] Previous or present malignancy                       (2 points)                                                                                                                                                         HEMATOLOGY RELATED FACTORS                                                         [ ] Prior episodes of VTE                                     (3 Points)                     [ ] Positive family history for VTE                      (3 Points)                  [ ] Prothrombin 59858 A                                     (3 Points)                     [ ] Factor V Leiden                                                (3 Points)                        [ ] Lupus anticoagulants                                      (3 Points)                                                           [ ] Anticardiolipin antibodies                              (3 Points)                                                       [ ] High homocysteine in the blood                   (3 Points)                                             [ ] Other congenital or acquired thrombophilia      (3 Points)                                                [ ] Heparin induced thrombocytopenia                  (3 Points)                                        MOBILITY RELATED FACTORS  [ ] Bed rest                                                         (1 Point)  [ ] Plaster cast                                                    (2 points)  [ ] Bed bound for more than 72 hours           (2 Points)    GENDER SPECIFIC FACTORS  [ ] Pregnancy or had a baby within the last month   (1 Point)  [ ] Post-partum < 6 weeks                                   (1 Point)  [ ] Hormonal therapy  or oral contraception   (1 Point)  [ ] History of pregnancy complications              (1 point)  [ ] Unexplained or recurrent              (1 Point)    OTHER RISK FACTORS                                           (1 Point)  [ ] BMI >40, smoking, diabetes requiring insulin, chemotherapy  blood transfusions and length of surgery over 2 hours    SURGERY RELATED RISK FACTORS  [ ]  Section within the last month     (1 Point)  [ ] Minor surgery                                                  (1 Point)  [ ] Arthroscopic surgery                                       (2 Points)  [ ] Planned major surgery lasting more            (2 Points)      than 45 minutes     [ ] Elective hip or knee joint replacement       (5 points)       surgery                                                TRAUMA RELATED RISK FACTORS  [ ] Fracture of the hip, pelvis, or leg                       (5 Points)  [ ] Spinal cord injury resulting in paralysis             (5 points)       (in the previous month)    [ ] Paralysis  (less than 1 month)                             (5 Points)  [ ] Multiple Trauma within 1 month                        (5 Points)    Total Score [        ]    Caprini Score 0-2: Low Risk, NO VTE prophylaxis required for most patients, encourage ambulation  Caprini Score 3-6: Moderate Risk , pharmacologic VTE prophylaxis is indicated for most patients (in the absence of contraindications)  Caprini Score Greater than or =7: High risk, pharmocologic VTE prophylaxis indicated for most patients (in the absence of contraindications)      OPIOID RISK TOOL    DAVID EACH BOX THAT APPLIES AND ADD TOTALS AT THE END    FAMILY HISTORY OF SUBSTANCE ABUSE                 FEMALE         MALE                                                Alcohol                             [  ]1 pt          [  ]3pts                                               Illegal Durgs                     [  ]2 pts        [  ]3pts                                               Rx Drugs                           [  ]4 pts        [  ]4 pts    PERSONAL HISTORY OF SUBSTANCE ABUSE                                                                                          Alcohol                             [  ]3 pts       [  ]3 pts                                               Illegal Drugs                     [  ]4 pts        [  ]4 pts                                               Rx Drugs                           [  ]5 pts        [  ]5 pts    AGE BETWEEN 16-45 YEARS                                      [  ]1 pt         [  ]1 pt    HISTORY OF PREADOLESCENT   SEXUAL ABUSE                                                             [  ]3 pts        [  ]0pts    PSYCHOLOGICAL DISEASE                     ADD, OCD, Bipolar, Schizophrenia        [  ]2 pts         [  ]2 pts                      Depression                                               [  ]1 pt           [  ]1 pt           SCORING TOTAL   (add numbers and type here)              (***)                                     A score of 3 or lower indicated LOW risk for future opioid abuse  A score of 4 to 7 indicated moderate risk for future opioid abuse  A score of 8 or higher indicates a high risk for opioid abuse                             This is a 83 year old  female in [place], presenting today in a wheelchair for PST, PMH includes lung cancer s/p radiation, CAD s/p CABG x 3, HTN, HLD, carotid stenosis s/p left endarterectomy and anxiety. Patient c/o longstanding right knee pain. Her pain has progressively worsened over the past 6 months. 4 view x-ray of the right knee performed on 24 in Dr. 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 Pain is affecting ability to perform ADLs. She has failed conservative treatments, including PT, gel and cortisone injections.  She is now scheduled for right SABA total knee replacement with Dr. Díaz on 3/13/24 pending medical and cardiac clearance.       CAPRINI SCORE    AGE RELATED RISK FACTORS                                                             [ ] Age 41-60 years                                            (1 Point)  [ ] Age: 61-74 years                                           (2 Points)                 [X ] Age= 75 years                                                (3 Points)             DISEASE RELATED RISK FACTORS                                                       [ ] Edema in the lower extremities                 (1 Point)                     [ ] Varicose veins                                               (1 Point)                                 [ X] BMI > 25 Kg/m2                                            (1 Point)                                  [ ] Serious infection (ie PNA)                            (1 Point)                     [X ] Lung disease ( COPD, Emphysema)            (1 Point)                                                                          [ ] Acute myocardial infarction                         (1 Point)                  [ ] Congestive heart failure (in the previous month)  (1 Point)         [ ] Inflammatory bowel disease                            (1 Point)                  [ ] Central venous access, PICC or Port               (2 points)       (within the last month)                                                                [ ] Stroke (in the previous month)                        (5 Points)    [ X] Previous or present malignancy                       (2 points)                                                                                                                                                         HEMATOLOGY RELATED FACTORS                                                         [ ] Prior episodes of VTE                                     (3 Points)                     [ ] Positive family history for VTE                      (3 Points)                  [ ] Prothrombin 46027 A                                     (3 Points)                     [ ] Factor V Leiden                                                (3 Points)                        [ ] Lupus anticoagulants                                      (3 Points)                                                           [ ] Anticardiolipin antibodies                              (3 Points)                                                       [ ] High homocysteine in the blood                   (3 Points)                                             [ ] Other congenital or acquired thrombophilia      (3 Points)                                                [ ] Heparin induced thrombocytopenia                  (3 Points)                                        MOBILITY RELATED FACTORS  [ ] Bed rest                                                         (1 Point)  [ ] Plaster cast                                                    (2 points)  [ ] Bed bound for more than 72 hours           (2 Points)    GENDER SPECIFIC FACTORS  [ ] Pregnancy or had a baby within the last month   (1 Point)  [ ] Post-partum < 6 weeks                                   (1 Point)  [ ] Hormonal therapy  or oral contraception   (1 Point)  [ ] History of pregnancy complications              (1 point)  [ ] Unexplained or recurrent              (1 Point)    OTHER RISK FACTORS                                           (1 Point)  [ ] BMI >40, smoking, diabetes requiring insulin, chemotherapy  blood transfusions and length of surgery over 2 hours    SURGERY RELATED RISK FACTORS  [ ]  Section within the last month     (1 Point)  [ ] Minor surgery                                                  (1 Point)  [ ] Arthroscopic surgery                                       (2 Points)  [ ] Planned major surgery lasting more            (2 Points)      than 45 minutes     [X ] Elective hip or knee joint replacement       (5 points)       surgery                                                TRAUMA RELATED RISK FACTORS  [ ] Fracture of the hip, pelvis, or leg                       (5 Points)  [ ] Spinal cord injury resulting in paralysis             (5 points)       (in the previous month)    [ ] Paralysis  (less than 1 month)                             (5 Points)  [ ] Multiple Trauma within 1 month                        (5 Points)    Total Score [    12    ]    Caprini Score 0-2: Low Risk, NO VTE prophylaxis required for most patients, encourage ambulation  Caprini Score 3-6: Moderate Risk , pharmacologic VTE prophylaxis is indicated for most patients (in the absence of contraindications)  Caprini Score Greater than or =7: High risk, pharmocologic VTE prophylaxis indicated for most patients (in the absence of contraindications)      OPIOID RISK TOOL    DAVID EACH BOX THAT APPLIES AND ADD TOTALS AT THE END    FAMILY HISTORY OF SUBSTANCE ABUSE                 FEMALE         MALE                                                Alcohol                             [  ]1 pt          [  ]3pts                                               Illegal Durgs                     [  ]2 pts        [  ]3pts                                               Rx Drugs                           [  ]4 pts        [  ]4 pts    PERSONAL HISTORY OF SUBSTANCE ABUSE                                                                                          Alcohol                             [  ]3 pts       [  ]3 pts                                               Illegal Drugs                     [  ]4 pts        [  ]4 pts                                               Rx Drugs                           [  ]5 pts        [  ]5 pts    AGE BETWEEN 16-45 YEARS                                      [  ]1 pt         [  ]1 pt    HISTORY OF PREADOLESCENT   SEXUAL ABUSE                                                             [  ]3 pts        [  ]0pts    PSYCHOLOGICAL DISEASE                     ADD, OCD, Bipolar, Schizophrenia        [  ]2 pts         [  ]2 pts                      Depression                                               [  ]1 pt           [  ]1 pt           SCORING TOTAL   (add numbers and type here)              (*0**)                                     A score of 3 or lower indicated LOW risk for future opioid abuse  A score of 4 to 7 indicated moderate risk for future opioid abuse  A score of 8 or higher indicates a high risk for opioid abuse

## 2024-02-22 NOTE — H&P PST ADULT - PROBLEM SELECTOR PLAN 1
Labs, EKG and MRSA/MSSA performed.  Scheduled for right SABA total knee replacement with Dr. Díaz on 3/13/24 pending medical and cardiac clearance.   Written and verbal instructions provided  Patient educated on surgical scrub, preadmission instructions, clearance and day of procedure medications, verbalizes understanding.  Patient was given information on joint class, joint book provided, ERP protocol reviewed with patient, MSSA/MRSA swabbed in PST, results pending  Patient instructed to stop vitamins/supplements/herbal medications/NSAIDS for one week prior to surgery and discuss with PMD, verbalized understanding.  Patient instructed to discuss ASA and Plavix with cardiologist, verbalized understanding.  Patient verbalized understanding of instructions and was given the opportunity to ask questions and have them answered.  Out patient medications reviewed and verified with patient.

## 2024-02-22 NOTE — H&P PST ADULT - NSICDXPASTSURGICALHX_GEN_ALL_CORE_FT
PAST SURGICAL HISTORY:  Rectal mass removed    S/P cataract surgery     S/P triple vessel bypass      PAST SURGICAL HISTORY:  H/O carotid endarterectomy     History of cataract surgery     Rectal mass removed    S/P cataract surgery     S/P triple vessel bypass

## 2024-02-22 NOTE — H&P PST ADULT - HISTORY OF PRESENT ILLNESS
Patient is a 83 year old  female presenting today in a wheelchair for PST, PMH includes CAD s/p CABG x 3, HTN, HLD, STU, carotid stenosis, and anxiety. Patient c/o longstanding right knee pain. Reports over the past  pain has been progressively worsening. Her pain is located to the front and sides of her right knee.   Describes pain as constant/intermittent  Sharp, stabbing, throbbing, achy dull or burning.  Current pain level /10 and maximum pain level of /10.  Reports pain/swelling/buckling.  Pain aggravated by weight bearing activities including, walking, standing, stairs and standing from seated position. Pain minimally relieved by rest/medication. She ambulates mostly with a cane.   Patient has tried conservative treatments including PT, gel and cortisone injections, last injection.    Patient is a 83 year old  female presenting today in a wheelchair for PST, PMH includes lung cancer s/p radiation, CAD s/p CABG x 3, HTN, HLD, carotid stenosis s/p left endarterectomy and anxiety. Patient c/o longstanding right knee pain. Reports over the past 6 months pain has been progressively worsening. Her pain is located to the front and sides of her right knee.  Describes pain as intermittent, achy and sharp. Current pain level 3/10 and maximum pain level of 9/10.  Reports clicking of right knee. Denies swelling or buckling of right knee. Pain aggravated by weight bearing activities including, walking, standing, stairs and standing from seated position. Pain minimally relieved by rest/medication. She ambulates mostly with a cane. Patient has tried conservative treatments including PT, gel and cortisone injections, last injection about 6 months ago with no relief. Patient states knee pain is affecting her ability to perform ADLs. She is now scheduled for right SABA total knee replacement with Dr. Díaz on 3/13/24 pending medical and cardiac clearance.    Patient is a 83 year old  female presenting today in a wheelchair for PST, PMH includes lung cancer s/p radiation, CAD s/p CABG x 3, HTN, HLD, carotid stenosis s/p left endarterectomy and anxiety. Patient c/o longstanding right knee pain. Reports over the past 6 months pain has been progressively worsening. Her pain is located to the front and sides of her right knee.  Describes pain as intermittent, achy and sharp. Current pain level 3/10 and maximum pain level of 9/10.  Reports clicking of right knee. Denies swelling or buckling of right knee. Pain aggravated by weight bearing activities including, walking, standing, stairs and standing from seated position. Pain minimally relieved by rest/medication. She ambulates mostly with a cane. Patient has tried conservative treatments including PT, gel and cortisone injections, last injection about 6 months ago with no relief. Patient states knee pain is affecting her ability to perform ADLs.   4 view x-ray of the right knee performed on 1/30/24 in Dr. 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   She is now scheduled for right SABA total knee replacement with Dr. Díaz on 3/13/24 pending medical and cardiac clearance.

## 2024-02-22 NOTE — H&P PST ADULT - PROBLEM SELECTOR PLAN 2
BP today 115/65  Continue medication.   EKG performed.  Patient instructed to hold Lisinopril (ACE), last dose to be on 3/12 in AM no Lisinopril on 3/13, verbalized understanding.  Patient instructed to take amlodipine, metoprolol and atorvastatin with a sip of water morning of procedure verbalized understanding.  Cardiac clearance pending.

## 2024-02-28 NOTE — PROVIDER CONTACT NOTE (OTHER) - SITUATION
Attended joint education session on 2/27/2024 via in person method with opportunity to ask questions. Contact information for follow up questions given.

## 2024-02-29 ENCOUNTER — APPOINTMENT (OUTPATIENT)
Dept: CARDIOLOGY | Facility: CLINIC | Age: 84
End: 2024-02-29
Payer: MEDICARE

## 2024-02-29 VITALS
HEIGHT: 66 IN | BODY MASS INDEX: 27.32 KG/M2 | SYSTOLIC BLOOD PRESSURE: 110 MMHG | DIASTOLIC BLOOD PRESSURE: 66 MMHG | HEART RATE: 86 BPM | OXYGEN SATURATION: 94 % | WEIGHT: 170 LBS

## 2024-02-29 PROCEDURE — 99214 OFFICE O/P EST MOD 30 MIN: CPT | Mod: 25

## 2024-02-29 PROCEDURE — 93000 ELECTROCARDIOGRAM COMPLETE: CPT

## 2024-02-29 RX ORDER — ATORVASTATIN CALCIUM 80 MG/1
80 TABLET, FILM COATED ORAL
Qty: 90 | Refills: 2 | Status: ACTIVE | COMMUNITY
Start: 2023-11-14 | End: 1900-01-01

## 2024-02-29 NOTE — PHYSICAL EXAM
[Well Developed] : well developed [Well Nourished] : well nourished [No Acute Distress] : no acute distress [Normal Conjunctiva] : normal conjunctiva [Normal Venous Pressure] : normal venous pressure [No Carotid Bruit] : no carotid bruit [Normal S1, S2] : normal S1, S2 [No Murmur] : no murmur [No Gallop] : no gallop [No Rub] : no rub [Clear Lung Fields] : clear lung fields [Good Air Entry] : good air entry [No Respiratory Distress] : no respiratory distress  [Soft] : abdomen soft [No Masses/organomegaly] : no masses/organomegaly [Non Tender] : non-tender [Normal Bowel Sounds] : normal bowel sounds [No Edema] : no edema [No Cyanosis] : no cyanosis [No Clubbing] : no clubbing [No Varicosities] : no varicosities [No Rash] : no rash [No Skin Lesions] : no skin lesions [Moves all extremities] : moves all extremities [No Focal Deficits] : no focal deficits [Normal Speech] : normal speech [Alert and Oriented] : alert and oriented [Normal memory] : normal memory [de-identified] : ambulates with cane

## 2024-02-29 NOTE — DISCUSSION/SUMMARY
[FreeTextEntry1] : 83 year old female with a history of 3 vessel CABG including LIMA to LAD for triple vessel CAD with systolic function 45-50% seen on TTE 01/2017.  Repeat TTE 2018 shows low-normal LV function with mild valvular regurgitation.   She also has history of L ICA stenosis and is s/p L TCAR with Dr Donaldson 12/2023 05/2023 mechanical fall and was hospitalized at [facility].  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  She completed RXT to R lung 09/2023.  Plan for observation for now  She is scheduled for R knee surgery 03/13/2024 with Dr Díaz at [facility].  Pt reports feeling well and has no active cardiac complaints - denies CP, SOB, palpitations, dizziness, syncope, edema, orthopnea, PND, orthopnea.  No exertional symptoms. She ambulates with cane/wheelchair  Nuclear stress test 12/2018 fair exercise tolerance, normal myocardial perfusion Pharm nuc stress test 07/2023 normal myocardial perfusion TTE 11/2018 EF 50-55% with inf wall hypokinesis, mild DD, mild TR TTE 12/2021 EF 55%, mild TR TTE 11/2023 EF 55-60%, trace MR/TR  At this time the pt has no signs or symptoms of active ischemia, heart failure, life-threatening arrhythmias, severe valvular pathology. VSS There are no cardiac contraindications for planned procedure.  Pt can hold ASA for 7 days prior to procedure  c/w metoprolol 100mg qd, lisinopril 40mg, amlodipine 5mg qd c/w ASA and plavix c/w statin, goal LDL <70 repeat labs in 3-4m  Pt will return in 6 mnths or sooner as needed but I encouraged communication via phone or portal if necessary.  The described plan was discussed with the pt.  All questions and concerns were addressed to the best of my knowledge.  [EKG obtained to assist in diagnosis and management of assessed problem(s)] : EKG obtained to assist in diagnosis and management of assessed problem(s)

## 2024-02-29 NOTE — HISTORY OF PRESENT ILLNESS
[FreeTextEntry1] : 83 year old female with a history of 3 vessel CABG including LIMA to LAD for triple vessel CAD with systolic function 45-50% seen on TTE 01/2017.  Repeat TTE 2018 shows low-normal LV function with mild valvular regurgitation.   She also has history of L ICA stenosis and is s/p L TCAR with Dr Donaldson 12/2023 05/2023 mechanical fall and was hospitalized at [facility].  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  She completed RXT to R lung 09/2023.  Plan for observation for now  She is scheduled for R knee surgery 03/13/2024 with Dr Díaz at [facility].  Pt reports feeling well and has no active cardiac complaints - denies CP, SOB, palpitations, dizziness, syncope, edema, orthopnea, PND, orthopnea.  No exertional symptoms. She ambulates with cane/wheelchair  Nuclear stress test 12/2018 fair exercise tolerance, normal myocardial perfusion Pharm nuc stress test 07/2023 normal myocardial perfusion TTE 11/2018 EF 50-55% with inf wall hypokinesis, mild DD, mild TR TTE 12/2021 EF 55%, mild TR TTE 11/2023 EF 55-60%, trace MR/TR

## 2024-03-06 RX ORDER — POVIDONE-IODINE 5 %
1 AEROSOL (ML) TOPICAL ONCE
Refills: 0 | Status: DISCONTINUED | OUTPATIENT
Start: 2024-03-13 | End: 2024-03-13

## 2024-03-08 RX ORDER — AMLODIPINE BESYLATE 5 MG/1
5 TABLET ORAL
Qty: 90 | Refills: 0 | Status: ACTIVE | COMMUNITY
Start: 2020-08-14 | End: 1900-01-01

## 2024-03-09 ENCOUNTER — APPOINTMENT (OUTPATIENT)
Dept: FAMILY MEDICINE | Facility: CLINIC | Age: 84
End: 2024-03-09
Payer: MEDICARE

## 2024-03-09 VITALS
WEIGHT: 170 LBS | SYSTOLIC BLOOD PRESSURE: 138 MMHG | TEMPERATURE: 97 F | BODY MASS INDEX: 29.02 KG/M2 | DIASTOLIC BLOOD PRESSURE: 80 MMHG | HEART RATE: 80 BPM | HEIGHT: 64 IN | OXYGEN SATURATION: 98 %

## 2024-03-09 DIAGNOSIS — Z95.1 PRESENCE OF AORTOCORONARY BYPASS GRAFT: ICD-10-CM

## 2024-03-09 DIAGNOSIS — I25.10 ATHEROSCLEROTIC HEART DISEASE OF NATIVE CORONARY ARTERY W/OUT ANGINA PECTORIS: ICD-10-CM

## 2024-03-09 DIAGNOSIS — J44.9 CHRONIC OBSTRUCTIVE PULMONARY DISEASE, UNSPECIFIED: ICD-10-CM

## 2024-03-09 DIAGNOSIS — I10 ESSENTIAL (PRIMARY) HYPERTENSION: ICD-10-CM

## 2024-03-09 DIAGNOSIS — M17.11 UNILATERAL PRIMARY OSTEOARTHRITIS, RIGHT KNEE: ICD-10-CM

## 2024-03-09 DIAGNOSIS — Z01.810 ENCOUNTER FOR PREPROCEDURAL CARDIOVASCULAR EXAMINATION: ICD-10-CM

## 2024-03-09 DIAGNOSIS — C34.90 MALIGNANT NEOPLASM OF UNSPECIFIED PART OF UNSPECIFIED BRONCHUS OR LUNG: ICD-10-CM

## 2024-03-09 PROCEDURE — 99214 OFFICE O/P EST MOD 30 MIN: CPT

## 2024-03-09 NOTE — PHYSICAL EXAM
[Normal Outer Ear/Nose] : the outer ears and nose were normal in appearance [Normal Oropharynx] : the oropharynx was normal [Soft] : abdomen soft [Non Tender] : non-tender [Normal] : affect was normal and insight and judgment were intact

## 2024-03-09 NOTE — ASSESSMENT
[FreeTextEntry4] : Pt is medically optimized for procedure at this time. Pt has hx of CAD s/p CABGx3, htn, hx lung CA, COPD. Pt has obtained clearance from her cardiologist. monitor perioperatively BP and for bronchospasm [Patient Optimized for Surgery] : Patient optimized for surgery

## 2024-03-09 NOTE — HISTORY OF PRESENT ILLNESS
[Coronary Artery Disease] : coronary artery disease [COPD] : COPD [No Adverse Anesthesia Reaction] : no adverse anesthesia reaction in self or family member [(Patient denies any chest pain, claudication, dyspnea on exertion, orthopnea, palpitations or syncope)] : Patient denies any chest pain, claudication, dyspnea on exertion, orthopnea, palpitations or syncope [Moderate (4-6 METs)] : Moderate (4-6 METs) [Smoker] : not a smoker [Chronic Anticoagulation] : no chronic anticoagulation [Chronic Kidney Disease] : no chronic kidney disease [Diabetes] : no diabetes [FreeTextEntry1] : OAR SORIA [FreeTextEntry2] : 3/13/24 [FreeTextEntry3] : Bre [FreeTextEntry4] : Pt in office for medical clearance. Pt denies chest pain, palpitations, dyspnea, fever, chills, nausea, or vomiting.. Surgery to take place at [facility]. Pt able to achieve 4 METS Pt hs hx of LICA stenosis, is s/p carotid endarterectomy 12/12/23. Dr. Romeo has instructed pt to hold plavix prior to surgery. Pt has CAD s/p 3 vessel CABG, sees cardio Dr. Salgado regularly. Pt has obtained clearance from cardiologist. Pt has hx COPD, R lung adenocarcinoma stg 1A s/p completion of radiation tx on 9/21/23. Pt had declined surgical intervention.   Pt has chronic anxiety and insomnia. Pt uses xanax prn which has helped control anxiety symptoms. Failed ambien in the past.  Exsmoker 1/2 ppd x 30 yrs, quit approx 2003

## 2024-03-12 ENCOUNTER — TRANSCRIPTION ENCOUNTER (OUTPATIENT)
Age: 84
End: 2024-03-12

## 2024-03-13 ENCOUNTER — TRANSCRIPTION ENCOUNTER (OUTPATIENT)
Age: 84
End: 2024-03-13

## 2024-03-13 ENCOUNTER — APPOINTMENT (OUTPATIENT)
Dept: ORTHOPEDIC SURGERY | Facility: HOSPITAL | Age: 84
End: 2024-03-13

## 2024-03-13 ENCOUNTER — INPATIENT (INPATIENT)
Facility: HOSPITAL | Age: 84
LOS: 0 days | Discharge: HOME HEALTH | DRG: 470 | End: 2024-03-14
Attending: ORTHOPAEDIC SURGERY | Admitting: ORTHOPAEDIC SURGERY
Payer: MEDICARE

## 2024-03-13 VITALS
WEIGHT: 170.2 LBS | TEMPERATURE: 98 F | SYSTOLIC BLOOD PRESSURE: 149 MMHG | HEIGHT: 64.02 IN | RESPIRATION RATE: 16 BRPM | HEART RATE: 58 BPM | OXYGEN SATURATION: 96 % | DIASTOLIC BLOOD PRESSURE: 81 MMHG

## 2024-03-13 DIAGNOSIS — Z98.49 CATARACT EXTRACTION STATUS, UNSPECIFIED EYE: Chronic | ICD-10-CM

## 2024-03-13 DIAGNOSIS — Z98.890 OTHER SPECIFIED POSTPROCEDURAL STATES: Chronic | ICD-10-CM

## 2024-03-13 DIAGNOSIS — M17.11 UNILATERAL PRIMARY OSTEOARTHRITIS, RIGHT KNEE: ICD-10-CM

## 2024-03-13 DIAGNOSIS — Z95.1 PRESENCE OF AORTOCORONARY BYPASS GRAFT: Chronic | ICD-10-CM

## 2024-03-13 DIAGNOSIS — K62.89 OTHER SPECIFIED DISEASES OF ANUS AND RECTUM: Chronic | ICD-10-CM

## 2024-03-13 PROCEDURE — 99222 1ST HOSP IP/OBS MODERATE 55: CPT

## 2024-03-13 PROCEDURE — 27447 TOTAL KNEE ARTHROPLASTY: CPT | Mod: AS,RT

## 2024-03-13 PROCEDURE — 27447 TOTAL KNEE ARTHROPLASTY: CPT | Mod: RT

## 2024-03-13 PROCEDURE — 0055T BONE SRGRY CMPTR CT/MRI IMAG: CPT | Mod: RT

## 2024-03-13 PROCEDURE — 73560 X-RAY EXAM OF KNEE 1 OR 2: CPT | Mod: 26,RT

## 2024-03-13 DEVICE — COMP FEM TRIATHLON CR SZ 2 RT: Type: IMPLANTABLE DEVICE | Status: FUNCTIONAL

## 2024-03-13 DEVICE — ZIMMER FEMALE HEX SCREW MAGNETIC 2.5MM X 25MM: Type: IMPLANTABLE DEVICE | Status: FUNCTIONAL

## 2024-03-13 DEVICE — BASEPLATE TIB UNIV TRIATHLON SZ 3: Type: IMPLANTABLE DEVICE | Status: FUNCTIONAL

## 2024-03-13 DEVICE — IMP PATELLA SYMMETRIC X3 31X9MM: Type: IMPLANTABLE DEVICE | Status: FUNCTIONAL

## 2024-03-13 DEVICE — CEMENT PALACOS R: Type: IMPLANTABLE DEVICE | Status: FUNCTIONAL

## 2024-03-13 DEVICE — INSERT TIB BEARING TRIATHLON CS X3 SZ 3 9MM: Type: IMPLANTABLE DEVICE | Status: FUNCTIONAL

## 2024-03-13 DEVICE — MAKO BONE PIN 4MM X 140MM: Type: IMPLANTABLE DEVICE | Status: FUNCTIONAL

## 2024-03-13 DEVICE — MAKO BONE PIN 4MM X 80MM: Type: IMPLANTABLE DEVICE | Status: FUNCTIONAL

## 2024-03-13 RX ORDER — HYDROMORPHONE HYDROCHLORIDE 2 MG/ML
4 INJECTION INTRAMUSCULAR; INTRAVENOUS; SUBCUTANEOUS
Refills: 0 | Status: DISCONTINUED | OUTPATIENT
Start: 2024-03-13 | End: 2024-03-14

## 2024-03-13 RX ORDER — LISINOPRIL 2.5 MG/1
1 TABLET ORAL
Qty: 0 | Refills: 0 | DISCHARGE

## 2024-03-13 RX ORDER — ACETAMINOPHEN 500 MG
975 TABLET ORAL ONCE
Refills: 0 | Status: COMPLETED | OUTPATIENT
Start: 2024-03-13 | End: 2024-03-13

## 2024-03-13 RX ORDER — CLOPIDOGREL BISULFATE 75 MG/1
75 TABLET, FILM COATED ORAL DAILY
Refills: 0 | Status: DISCONTINUED | OUTPATIENT
Start: 2024-03-14 | End: 2024-03-14

## 2024-03-13 RX ORDER — ZOLPIDEM TARTRATE 10 MG/1
5 TABLET ORAL AT BEDTIME
Refills: 0 | Status: DISCONTINUED | OUTPATIENT
Start: 2024-03-13 | End: 2024-03-14

## 2024-03-13 RX ORDER — TRANEXAMIC ACID 100 MG/ML
1000 INJECTION, SOLUTION INTRAVENOUS ONCE
Refills: 0 | Status: DISCONTINUED | OUTPATIENT
Start: 2024-03-13 | End: 2024-03-13

## 2024-03-13 RX ORDER — ONDANSETRON 8 MG/1
4 TABLET, FILM COATED ORAL ONCE
Refills: 0 | Status: DISCONTINUED | OUTPATIENT
Start: 2024-03-13 | End: 2024-03-13

## 2024-03-13 RX ORDER — KETOROLAC TROMETHAMINE 30 MG/ML
15 SYRINGE (ML) INJECTION EVERY 6 HOURS
Refills: 0 | Status: COMPLETED | OUTPATIENT
Start: 2024-03-13 | End: 2024-03-14

## 2024-03-13 RX ORDER — PANTOPRAZOLE SODIUM 20 MG/1
40 TABLET, DELAYED RELEASE ORAL
Refills: 0 | Status: DISCONTINUED | OUTPATIENT
Start: 2024-03-13 | End: 2024-03-14

## 2024-03-13 RX ORDER — ACETAMINOPHEN 500 MG
975 TABLET ORAL EVERY 8 HOURS
Refills: 0 | Status: DISCONTINUED | OUTPATIENT
Start: 2024-03-13 | End: 2024-03-14

## 2024-03-13 RX ORDER — HYDROMORPHONE HYDROCHLORIDE 2 MG/ML
0.25 INJECTION INTRAMUSCULAR; INTRAVENOUS; SUBCUTANEOUS
Refills: 0 | Status: DISCONTINUED | OUTPATIENT
Start: 2024-03-13 | End: 2024-03-13

## 2024-03-13 RX ORDER — ZOLPIDEM TARTRATE 10 MG/1
1 TABLET ORAL
Refills: 0 | DISCHARGE

## 2024-03-13 RX ORDER — OXYCODONE HYDROCHLORIDE 5 MG/1
10 TABLET ORAL
Refills: 0 | Status: DISCONTINUED | OUTPATIENT
Start: 2024-03-13 | End: 2024-03-14

## 2024-03-13 RX ORDER — ATORVASTATIN CALCIUM 80 MG/1
40 TABLET, FILM COATED ORAL AT BEDTIME
Refills: 0 | Status: DISCONTINUED | OUTPATIENT
Start: 2024-03-13 | End: 2024-03-14

## 2024-03-13 RX ORDER — SENNA PLUS 8.6 MG/1
2 TABLET ORAL AT BEDTIME
Refills: 0 | Status: DISCONTINUED | OUTPATIENT
Start: 2024-03-13 | End: 2024-03-14

## 2024-03-13 RX ORDER — SODIUM CHLORIDE 9 MG/ML
500 INJECTION INTRAMUSCULAR; INTRAVENOUS; SUBCUTANEOUS ONCE
Refills: 0 | Status: DISCONTINUED | OUTPATIENT
Start: 2024-03-13 | End: 2024-03-14

## 2024-03-13 RX ORDER — CLOPIDOGREL BISULFATE 75 MG/1
1 TABLET, FILM COATED ORAL
Refills: 0 | DISCHARGE

## 2024-03-13 RX ORDER — METOPROLOL TARTRATE 50 MG
100 TABLET ORAL DAILY
Refills: 0 | Status: DISCONTINUED | OUTPATIENT
Start: 2024-03-13 | End: 2024-03-14

## 2024-03-13 RX ORDER — CEFAZOLIN SODIUM 1 G
2000 VIAL (EA) INJECTION ONCE
Refills: 0 | Status: DISCONTINUED | OUTPATIENT
Start: 2024-03-13 | End: 2024-03-13

## 2024-03-13 RX ORDER — ALPRAZOLAM 0.25 MG
1 TABLET ORAL
Refills: 0 | Status: DISCONTINUED | OUTPATIENT
Start: 2024-03-13 | End: 2024-03-14

## 2024-03-13 RX ORDER — AMLODIPINE BESYLATE 2.5 MG/1
1 TABLET ORAL
Qty: 0 | Refills: 0 | DISCHARGE

## 2024-03-13 RX ORDER — FENTANYL CITRATE 50 UG/ML
25 INJECTION INTRAVENOUS
Refills: 0 | Status: DISCONTINUED | OUTPATIENT
Start: 2024-03-13 | End: 2024-03-13

## 2024-03-13 RX ORDER — LISINOPRIL 2.5 MG/1
40 TABLET ORAL DAILY
Refills: 0 | Status: DISCONTINUED | OUTPATIENT
Start: 2024-03-15 | End: 2024-03-14

## 2024-03-13 RX ORDER — OXYCODONE HYDROCHLORIDE 5 MG/1
5 TABLET ORAL
Refills: 0 | Status: DISCONTINUED | OUTPATIENT
Start: 2024-03-13 | End: 2024-03-14

## 2024-03-13 RX ORDER — MAGNESIUM HYDROXIDE 400 MG/1
30 TABLET, CHEWABLE ORAL DAILY
Refills: 0 | Status: DISCONTINUED | OUTPATIENT
Start: 2024-03-13 | End: 2024-03-14

## 2024-03-13 RX ORDER — ONDANSETRON 8 MG/1
4 TABLET, FILM COATED ORAL EVERY 6 HOURS
Refills: 0 | Status: DISCONTINUED | OUTPATIENT
Start: 2024-03-13 | End: 2024-03-14

## 2024-03-13 RX ORDER — AMLODIPINE BESYLATE 2.5 MG/1
5 TABLET ORAL DAILY
Refills: 0 | Status: DISCONTINUED | OUTPATIENT
Start: 2024-03-13 | End: 2024-03-14

## 2024-03-13 RX ORDER — ACETAMINOPHEN 500 MG
1000 TABLET ORAL ONCE
Refills: 0 | Status: COMPLETED | OUTPATIENT
Start: 2024-03-13 | End: 2024-03-13

## 2024-03-13 RX ORDER — SODIUM CHLORIDE 9 MG/ML
1000 INJECTION, SOLUTION INTRAVENOUS
Refills: 0 | Status: DISCONTINUED | OUTPATIENT
Start: 2024-03-13 | End: 2024-03-13

## 2024-03-13 RX ORDER — SODIUM CHLORIDE 9 MG/ML
1000 INJECTION INTRAMUSCULAR; INTRAVENOUS; SUBCUTANEOUS
Refills: 0 | Status: DISCONTINUED | OUTPATIENT
Start: 2024-03-13 | End: 2024-03-14

## 2024-03-13 RX ORDER — ATORVASTATIN CALCIUM 80 MG/1
1 TABLET, FILM COATED ORAL
Qty: 0 | Refills: 0 | DISCHARGE

## 2024-03-13 RX ORDER — ASPIRIN/CALCIUM CARB/MAGNESIUM 324 MG
81 TABLET ORAL
Refills: 0 | Status: DISCONTINUED | OUTPATIENT
Start: 2024-03-14 | End: 2024-03-14

## 2024-03-13 RX ORDER — CEFAZOLIN SODIUM 1 G
2000 VIAL (EA) INJECTION
Refills: 0 | Status: COMPLETED | OUTPATIENT
Start: 2024-03-13 | End: 2024-03-14

## 2024-03-13 RX ORDER — METOPROLOL TARTRATE 50 MG
1 TABLET ORAL
Qty: 0 | Refills: 0 | DISCHARGE

## 2024-03-13 RX ORDER — ALPRAZOLAM 0.25 MG
1 TABLET ORAL
Qty: 0 | Refills: 0 | DISCHARGE

## 2024-03-13 RX ORDER — CELECOXIB 200 MG/1
200 CAPSULE ORAL EVERY 12 HOURS
Refills: 0 | Status: DISCONTINUED | OUTPATIENT
Start: 2024-03-15 | End: 2024-03-14

## 2024-03-13 RX ORDER — SODIUM CHLORIDE 9 MG/ML
500 INJECTION, SOLUTION INTRAVENOUS ONCE
Refills: 0 | Status: COMPLETED | OUTPATIENT
Start: 2024-03-13 | End: 2024-03-13

## 2024-03-13 RX ORDER — APREPITANT 80 MG/1
40 CAPSULE ORAL ONCE
Refills: 0 | Status: COMPLETED | OUTPATIENT
Start: 2024-03-13 | End: 2024-03-13

## 2024-03-13 RX ADMIN — APREPITANT 40 MILLIGRAM(S): 80 CAPSULE ORAL at 10:30

## 2024-03-13 RX ADMIN — OXYCODONE HYDROCHLORIDE 5 MILLIGRAM(S): 5 TABLET ORAL at 19:01

## 2024-03-13 RX ADMIN — OXYCODONE HYDROCHLORIDE 5 MILLIGRAM(S): 5 TABLET ORAL at 20:01

## 2024-03-13 RX ADMIN — Medication 2000 MILLIGRAM(S): at 22:35

## 2024-03-13 RX ADMIN — Medication 975 MILLIGRAM(S): at 10:30

## 2024-03-13 RX ADMIN — Medication 15 MILLIGRAM(S): at 18:34

## 2024-03-13 RX ADMIN — SENNA PLUS 2 TABLET(S): 8.6 TABLET ORAL at 22:35

## 2024-03-13 RX ADMIN — Medication 15 MILLIGRAM(S): at 23:43

## 2024-03-13 RX ADMIN — Medication 400 MILLIGRAM(S): at 23:43

## 2024-03-13 RX ADMIN — ATORVASTATIN CALCIUM 40 MILLIGRAM(S): 80 TABLET, FILM COATED ORAL at 22:34

## 2024-03-13 RX ADMIN — Medication 15 MILLIGRAM(S): at 19:34

## 2024-03-13 RX ADMIN — ZOLPIDEM TARTRATE 5 MILLIGRAM(S): 10 TABLET ORAL at 22:34

## 2024-03-13 RX ADMIN — SODIUM CHLORIDE 1000 MILLILITER(S): 9 INJECTION, SOLUTION INTRAVENOUS at 15:55

## 2024-03-13 NOTE — PHYSICAL THERAPY INITIAL EVALUATION ADULT - TRANSFER SAFETY CONCERNS NOTED: SIT/STAND, REHAB EVAL
pt impulsively initiates sitting when too far away from seat./decreased safety awareness/losing balance/decreased weight-shifting ability

## 2024-03-13 NOTE — PHYSICAL THERAPY INITIAL EVALUATION ADULT - CRITERIA FOR SKILLED THERAPEUTIC INTERVENTIONS
Home PT and assist from family as needed./impairments found/functional limitations in following categories/anticipated discharge recommendation

## 2024-03-13 NOTE — PATIENT PROFILE ADULT - LEGAL HELP
A/P:  22 yo M w/  -SAH  -atlantooccipital fx,   -spinal shock  -status epileptics     P;  cont Kumar  cont Mech vent  cont propofol  f/u NS reccs  GOC family discussion  cont rest of management as per ICU    plan d/w attending      no

## 2024-03-13 NOTE — ASU PREOP CHECKLIST - HAIR REMOVAL
FYI: pre- op. No concerns GLORIA Looney, NP-C [facility] 
GLORIA Castro, NP-C [facility] 
hair removal not indicated

## 2024-03-13 NOTE — DISCHARGE NOTE PROVIDER - NSDCMRMEDTOKEN_GEN_ALL_CORE_FT
ALPRAZolam 1 mg oral tablet: 1 tab(s) orally 2 times a day, As Needed  amLODIPine 5 mg oral tablet: 1 tab(s) orally once a day  aspirin 81 mg oral delayed release tablet: 1 tab(s) orally once a day  atorvastatin 40 mg oral tablet: 1 tab(s) orally once a day  celecoxib 200 mg oral capsule: 1 cap(s) orally once a day  cyclobenzaprine 10 mg oral tablet: 1 tab(s) orally once a day  lisinopril 40 mg oral tablet: 1 tab(s) orally once a day  metoprolol succinate 100 mg oral capsule, extended release: 1 cap(s) orally once a day  Multiple Vitamins oral capsule: 1 cap(s) orally once a day  Plavix 75 mg oral tablet: 1 tab(s) orally once a day  zolpidem 10 mg oral tablet: 1 tab(s) orally once a day (at bedtime)

## 2024-03-13 NOTE — PHYSICAL THERAPY INITIAL EVALUATION ADULT - ADDITIONAL COMMENTS
Pt reports she lives with her son in a private home with 0 BRITTANY. Pts home has 5 steps with 1 HR + 5 steps with 2 HR inside. Pt reports that prior to surgery she was I in all ADLs and ambulation without assistive devices, except that she used a RW at night. Pt  reports that her Son works full time and will not be available to assist her upon DC.    DME: pt owns a RW and cane

## 2024-03-13 NOTE — CONSULT NOTE ADULT - SUBJECTIVE AND OBJECTIVE BOX
82 y/o female with Hx of lung cancer s/p radiation, CAD s/p CABG x 3, HTN, HLD, carotid stenosis s/p left endarterectomy and anxiety, she has longstanding right knee pain, over the past 6 months pain has been progressively worsening, she has tried conservative treatments including PT, gel and cortisone injections, last injection about 6 months ago with no relieve she came in here for elective right SABA total knee replacement with Dr. Díaz on 3/13/24 s/p procedure, patient's pain is well controlled, denies fever, chills, chest pain, sob     Allergies:  	No Known Allergies:       PAST MEDICAL HISTORY:  Angina pectoris     Anxiety     CAD (coronary artery disease)     Chronic back pain     H/O carotid stenosis     History of COPD     Hyperlipidemia     Hypertension     Lumbar stenosis     STU (obstructive sleep apnea)     Rectal mass.     PAST SURGICAL HISTORY:  H/O carotid endarterectomy     History of cataract surgery     Rectal mass removed    S/P cataract surgery     S/P triple vessel bypass.     FAMILY HISTORY:  Father  Still living? No  Family history of lung cancer, Age at diagnosis: Age Unknown    Mother  Still living? No  Family history of CHF (congestive heart failure), Age at diagnosis: Age Unknown.      Social History:   Not a smoker, drinker or using any drugs       Home Medications:   * Incomplete Medication History as of 22-Feb-2024 15:16 documented in Structured Notes  · 	aspirin 81 mg oral delayed release tablet: Last Dose Taken:  , 1 tab(s) orally once a day  · 	metoprolol succinate 100 mg oral capsule, extended release: Last Dose Taken:  , 1 cap(s) orally once a day  · 	ALPRAZolam 1 mg oral tablet: Last Dose Taken:  , 1 tab(s) orally 2 times a day, As Needed  · 	amLODIPine 5 mg oral tablet: Last Dose Taken:  , 1 tab(s) orally once a day  · 	lisinopril 40 mg oral tablet: Last Dose Taken:  , 1 tab(s) orally once a day  · 	zolpidem 10 mg oral tablet: Last Dose Taken:  , 1 tab(s) orally once a day (at bedtime)  · 	cyclobenzaprine 10 mg oral tablet: Last Dose Taken:  , 1 tab(s) orally once a day  · 	Multiple Vitamins oral capsule: Last Dose Taken:  , 1 cap(s) orally once a day  · 	atorvastatin 40 mg oral tablet: Last Dose Taken:  , 1 tab(s) orally once a day  · 	Plavix 75 mg oral tablet: Last Dose Taken:  , 1 tab(s) orally once a day  · 	celecoxib 200 mg oral capsule: Last Dose Taken:  , 1 cap(s) orally once a day    Vital Signs Last 24 Hrs  T(C): 36.6 (13 Mar 2024 10:08), Max: 36.6 (13 Mar 2024 10:08)  T(F): 97.8 (13 Mar 2024 10:08), Max: 97.8 (13 Mar 2024 10:08)  HR: 58 (13 Mar 2024 10:08) (58 - 58)  BP: 149/81 (13 Mar 2024 10:08) (149/81 - 149/81)  BP(mean): --  RR: 16 (13 Mar 2024 10:08) (16 - 16)  SpO2: 96% (13 Mar 2024 10:08) (96% - 96%)        PHYSICAL EXAM:    GENERAL: Elderly female looking comfortable   HEENT: PERRL, +EOMI  NECK: soft, Supple, No JVD   CHEST/LUNG: Clear to auscultate bilaterally; No wheezing  HEART: S1S2+, Regular rate and rhythm; No murmurs  ABDOMEN: Soft, Nontender, Nondistended; Bowel sounds present  EXTREMITIES:  1+ Peripheral Pulses, No edema, right knee Joint area cover with dressing, no bleeding or soaking   SKIN: No rashes or lesions  NEURO: AAOX3  PSYCH: normal mood

## 2024-03-13 NOTE — PHYSICAL THERAPY INITIAL EVALUATION ADULT - PHYSICAL ASSIST/NONPHYSICAL ASSIST: SUPINE/SIT, REHAB EVAL
to maintain pt safety due to pt impulsively grabbing RW from a supine position to attempt to use RW for supine to sit bed mobility/verbal cues/1 person assist

## 2024-03-13 NOTE — PHYSICAL THERAPY INITIAL EVALUATION ADULT - PLANNED THERAPY INTERVENTIONS, PT EVAL
stair training goal: pt will ascend/descend 10 steps with 1 HR modified independently./gait training/transfer training

## 2024-03-13 NOTE — PATIENT PROFILE ADULT - HAVE YOU EXPERIENCED VIOLENCE OR A TRAUMATIC EVENT?
Vitals: tachy at 109  Gen: AAOx3, NAD, sitting comfortably in stretcher, non-toxic, normal voice  Head: ncat, perrla, eomi b/l  ENT: b/l tonsillar exudate, 2+ tonsillar swelling b/l  Neck: supple, + anterior cervical lymphadenopathy, no midline deviation  Heart: rrr, no m/r/g  Lungs: CTA b/l, no rales/ronchi/wheezes  Abd: soft, nontender, non-distended, no rebound or guarding  Ext: no clubbing/cyanosis/edema  Neuro: sensation and muscle strength intact b/l, steady gait no

## 2024-03-13 NOTE — DISCHARGE NOTE PROVIDER - CARE PROVIDERS DIRECT ADDRESSES
[email or web address]

## 2024-03-13 NOTE — PHYSICAL THERAPY INITIAL EVALUATION ADULT - GENERAL OBSERVATIONS, REHAB EVAL
pt received reclining  on stretcher in [facility] on 2 trisha, guard rails x 2, IV intact, SCDs donned, pure wick intact. Pt agreeable to PT evaluation.

## 2024-03-13 NOTE — PHYSICAL THERAPY INITIAL EVALUATION ADULT - RANGE OF MOTION EXAMINATION, REHAB EVAL
RLE: knee extension - 10 degrees, knee flexion to approximately 80 degrees. hip and ankle WFL./bilateral upper extremity ROM was WNL (within normal limits)/Left LE ROM was WNL (within normal limits)

## 2024-03-13 NOTE — PHYSICAL THERAPY INITIAL EVALUATION ADULT - PERTINENT HX OF CURRENT PROBLEM, REHAB EVAL
Dx: Pt is s/p right knee TKA  PMH: lung CA s/p radiation, CAD s/p CABG x 3, HTN, HLD, carotid stenosis s/p Left endarterectomy, anxiety

## 2024-03-13 NOTE — DISCHARGE NOTE PROVIDER - NSDCFUADDINST_GEN_ALL_CORE_FT
The patient will be seen in the office between 2-3 weeks for wound check.   **Your first post-operative visit has been scheduled prior to your admission. PLEASE CONTACT OFFICE TO CONFIRM THE APPOINTMENT DATE. Sutures/Staples/Tape will be removed at that time.  **  The silver based dressing is to be removed 7 days from the date of surgery.   ** CONTACT THE OFFICE IF THE FOLLOWING DEVELOP:  - the dressing becomes soiled or saturated  - you develop a fever greater that 101F  - the wound becomes red or you develop blistering around the wound  * Patient may shower after post-op day #3.   * The patient will continue home PT consistent with  total knee replacement protocol. Transition to outpatient PT will occur at the time of the first office visit.   * The patient will practice knee extension exercises regularly to minimize hamstring contraction.   * The patient is FULL weight bearing.  * The patient will continue Plavix/ASPIRIN for 6 weeks after surgery for blood clot prevention.  * The patient will take OXYCODONE for pain control and adjust according to prescription and patient needs.  The patient will also take TYLENOL 975mg every 8 hours for pain.   Contact the office if pain increases while taking prescribed pain medications or related concerns develop.  * Celebrex will be taken twice daily for 3 weeks for pain control and prevention of excessive bone growth. Additional prescription may be requested at your office follow-up visit.   * The patient will take Senna S while taking oxycodone to prevent narcotic associated constipation.  Additionally, increase water intake (drink at least 8 glasses of water daily) and try adding fiber to the diet by eating fruits, vegetables and foods that are rich in grains. If constipation is experienced, contact the medical/primary care provider to discuss further treatment options.  * To avoid injury at home:  - continue use of rolling walker until cleared by physical therapist  - have family or friend remove all throw rug or objects in hallways that may present a trip hazard.  - if you experience any dizziness or medical concerns, call your medical doctor or  911.  * The implant may activate metal detection devices.

## 2024-03-13 NOTE — DISCHARGE NOTE PROVIDER - NSDCFUSCHEDAPPT_GEN_ALL_CORE_FT
Lul Díaz  [facility]  ORTHOSURG [address]  Scheduled Appointment: 04/09/2024    [facility]  VASCULAR [address]  Scheduled Appointment: 04/25/2024    Charbel Leonard  [facility]  VASCULAR [address]  Scheduled Appointment: 04/25/2024    Lul Díaz  [facility]  ORTHOSURG [address]  Scheduled Appointment: 04/30/2024

## 2024-03-13 NOTE — DISCHARGE NOTE PROVIDER - CARE PROVIDER_API CALL
Lul Díaz.  Orthopaedic Surgery  [address], [address] [place] [address]  Phone: [phone]  Fax: [phone]  Follow Up Time:

## 2024-03-13 NOTE — DISCHARGE NOTE PROVIDER - HOSPITAL COURSE
The patient underwent a RIGHT TOTAL KNEE REPLACEMENT on 3/13/24. The patient received antibiotics consistent with SCIP guidelines. The patient underwent the procedure and had no intra-operative complications. Post-operatively, the patient was seen by medicine and PT. The patient received Plavix/ASA for DVTP. The patient received pain medications per orthopedic pain management protocol and the pain was appropriately controlled. The patient did not have any post-operative medical complications. The patient was discharged in stable condition.   The patient underwent a RIGHT TOTAL KNEE REPLACEMENT on 3/13/24. The patient received antibiotics consistent with SCIP guidelines. The patient underwent the procedure and had no intra-operative complications. Post-operatively, the patient was seen by medicine and PT. The patient received Plavix/ASPIRIN for DVTP. The patient received pain medications per orthopedic pain management protocol and the pain was appropriately controlled. The patient did not have any post-operative medical complications. The patient was discharged in stable condition.

## 2024-03-13 NOTE — PHYSICAL THERAPY INITIAL EVALUATION ADULT - GAIT DEVIATIONS NOTED, PT EVAL
pt maintains RLE with excessive knee flexion throughout gait cycle, pt pushes RW too far away from self, unsafe use of RW./decreased myah/decreased step length/decreased weight-shifting ability

## 2024-03-13 NOTE — PATIENT PROFILE ADULT - FALL HARM RISK - HARM RISK INTERVENTIONS
Assistance with ambulation/Assistance OOB with selected safe patient handling equipment/Communicate Risk of Fall with Harm to all staff/Discuss with provider need for PT consult/Monitor gait and stability/Provide patient with walking aids - walker, cane, crutches/Reinforce activity limits and safety measures with patient and family/Sit up slowly, dangle for a short time, stand at bedside before walking/Tailored Fall Risk Interventions/Use of alarms - bed, chair and/or voice tab/Visual Cue: Yellow wristband and red socks/Bed in lowest position, wheels locked, appropriate side rails in place/Call bell, personal items and telephone in reach/Instruct patient to call for assistance before getting out of bed or chair/Non-slip footwear when patient is out of bed/[place] to call system/Physically safe environment - no spills, clutter or unnecessary equipment/Purposeful Proactive Rounding/Room/bathroom lighting operational, light cord in reach

## 2024-03-14 ENCOUNTER — NON-APPOINTMENT (OUTPATIENT)
Age: 84
End: 2024-03-14

## 2024-03-14 ENCOUNTER — TRANSCRIPTION ENCOUNTER (OUTPATIENT)
Age: 84
End: 2024-03-14

## 2024-03-14 VITALS
SYSTOLIC BLOOD PRESSURE: 124 MMHG | RESPIRATION RATE: 18 BRPM | HEART RATE: 87 BPM | TEMPERATURE: 98 F | DIASTOLIC BLOOD PRESSURE: 76 MMHG | OXYGEN SATURATION: 92 %

## 2024-03-14 LAB
ANION GAP SERPL CALC-SCNC: 15 MMOL/L — SIGNIFICANT CHANGE UP (ref 5–17)
BUN SERPL-MCNC: 15.8 MG/DL — SIGNIFICANT CHANGE UP (ref 8–20)
CALCIUM SERPL-MCNC: 8.4 MG/DL — SIGNIFICANT CHANGE UP (ref 8.4–10.5)
CHLORIDE SERPL-SCNC: 105 MMOL/L — SIGNIFICANT CHANGE UP (ref 96–108)
CO2 SERPL-SCNC: 20 MMOL/L — LOW (ref 22–29)
CREAT SERPL-MCNC: 0.88 MG/DL — SIGNIFICANT CHANGE UP (ref 0.5–1.3)
EGFR: 66 ML/MIN/1.73M2 — SIGNIFICANT CHANGE UP
GLUCOSE SERPL-MCNC: 189 MG/DL — HIGH (ref 70–99)
HCT VFR BLD CALC: 40.8 % — SIGNIFICANT CHANGE UP (ref 34.5–45)
HGB BLD-MCNC: 12.7 G/DL — SIGNIFICANT CHANGE UP (ref 11.5–15.5)
MCHC RBC-ENTMCNC: 25 PG — LOW (ref 27–34)
MCHC RBC-ENTMCNC: 31.1 GM/DL — LOW (ref 32–36)
MCV RBC AUTO: 80.3 FL — SIGNIFICANT CHANGE UP (ref 80–100)
PLATELET # BLD AUTO: 243 K/UL — SIGNIFICANT CHANGE UP (ref 150–400)
POTASSIUM SERPL-MCNC: 4.2 MMOL/L — SIGNIFICANT CHANGE UP (ref 3.5–5.3)
POTASSIUM SERPL-SCNC: 4.2 MMOL/L — SIGNIFICANT CHANGE UP (ref 3.5–5.3)
RBC # BLD: 5.08 M/UL — SIGNIFICANT CHANGE UP (ref 3.8–5.2)
RBC # FLD: 15 % — HIGH (ref 10.3–14.5)
SODIUM SERPL-SCNC: 140 MMOL/L — SIGNIFICANT CHANGE UP (ref 135–145)
WBC # BLD: 11.93 K/UL — HIGH (ref 3.8–10.5)
WBC # FLD AUTO: 11.93 K/UL — HIGH (ref 3.8–10.5)

## 2024-03-14 PROCEDURE — 97163 PT EVAL HIGH COMPLEX 45 MIN: CPT

## 2024-03-14 PROCEDURE — 36415 COLL VENOUS BLD VENIPUNCTURE: CPT

## 2024-03-14 PROCEDURE — S2900: CPT

## 2024-03-14 PROCEDURE — C1713: CPT

## 2024-03-14 PROCEDURE — 73560 X-RAY EXAM OF KNEE 1 OR 2: CPT

## 2024-03-14 PROCEDURE — 99232 SBSQ HOSP IP/OBS MODERATE 35: CPT

## 2024-03-14 PROCEDURE — 85027 COMPLETE CBC AUTOMATED: CPT

## 2024-03-14 PROCEDURE — C1776: CPT

## 2024-03-14 PROCEDURE — 80048 BASIC METABOLIC PNL TOTAL CA: CPT

## 2024-03-14 RX ORDER — CELECOXIB 200 MG/1
1 CAPSULE ORAL
Refills: 0 | DISCHARGE

## 2024-03-14 RX ORDER — ASPIRIN/CALCIUM CARB/MAGNESIUM 324 MG
1 TABLET ORAL
Refills: 0 | DISCHARGE

## 2024-03-14 RX ORDER — ASPIRIN/CALCIUM CARB/MAGNESIUM 324 MG
1 TABLET ORAL
Qty: 84 | Refills: 0
Start: 2024-03-14 | End: 2024-04-24

## 2024-03-14 RX ORDER — ONDANSETRON 8 MG/1
1 TABLET, FILM COATED ORAL
Qty: 1 | Refills: 0
Start: 2024-03-14 | End: 2024-03-18

## 2024-03-14 RX ORDER — NALOXONE HYDROCHLORIDE 4 MG/.1ML
4 SPRAY NASAL
Qty: 1 | Refills: 0
Start: 2024-03-14

## 2024-03-14 RX ORDER — SENNOSIDES/DOCUSATE SODIUM 8.6MG-50MG
2 TABLET ORAL
Qty: 14 | Refills: 0
Start: 2024-03-14 | End: 2024-03-20

## 2024-03-14 RX ORDER — ACETAMINOPHEN 500 MG
3 TABLET ORAL
Qty: 0 | Refills: 0 | DISCHARGE
Start: 2024-03-14

## 2024-03-14 RX ORDER — CYCLOBENZAPRINE HYDROCHLORIDE 10 MG/1
1 TABLET, FILM COATED ORAL
Refills: 0 | DISCHARGE

## 2024-03-14 RX ORDER — OMEPRAZOLE 10 MG/1
1 CAPSULE, DELAYED RELEASE ORAL
Qty: 42 | Refills: 0
Start: 2024-03-14 | End: 2024-04-24

## 2024-03-14 RX ORDER — OXYCODONE HYDROCHLORIDE 5 MG/1
1 TABLET ORAL
Qty: 28 | Refills: 0
Start: 2024-03-14 | End: 2024-03-20

## 2024-03-14 RX ADMIN — Medication 975 MILLIGRAM(S): at 05:30

## 2024-03-14 RX ADMIN — SODIUM CHLORIDE 125 MILLILITER(S): 9 INJECTION INTRAMUSCULAR; INTRAVENOUS; SUBCUTANEOUS at 05:31

## 2024-03-14 RX ADMIN — Medication 81 MILLIGRAM(S): at 05:32

## 2024-03-14 RX ADMIN — AMLODIPINE BESYLATE 5 MILLIGRAM(S): 2.5 TABLET ORAL at 05:32

## 2024-03-14 RX ADMIN — Medication 100 MILLIGRAM(S): at 05:32

## 2024-03-14 RX ADMIN — ZOLPIDEM TARTRATE 5 MILLIGRAM(S): 10 TABLET ORAL at 01:41

## 2024-03-14 RX ADMIN — Medication 1 MILLIGRAM(S): at 08:51

## 2024-03-14 RX ADMIN — PANTOPRAZOLE SODIUM 40 MILLIGRAM(S): 20 TABLET, DELAYED RELEASE ORAL at 05:31

## 2024-03-14 RX ADMIN — Medication 15 MILLIGRAM(S): at 06:30

## 2024-03-14 RX ADMIN — Medication 975 MILLIGRAM(S): at 06:30

## 2024-03-14 RX ADMIN — Medication 2000 MILLIGRAM(S): at 05:50

## 2024-03-14 RX ADMIN — Medication 15 MILLIGRAM(S): at 00:43

## 2024-03-14 RX ADMIN — Medication 1000 MILLIGRAM(S): at 00:43

## 2024-03-14 RX ADMIN — Medication 15 MILLIGRAM(S): at 05:30

## 2024-03-14 NOTE — DISCHARGE NOTE NURSING/CASE MANAGEMENT/SOCIAL WORK - PATIENT PORTAL LINK FT
You can access the FollowMyHealth Patient Portal offered by [facility] by registering at the following website: [email or web address]. By joining [facility]’s FollowMyHealth portal, you will also be able to view your health information using other applications (apps) compatible with our system.

## 2024-03-14 NOTE — DISCHARGE NOTE NURSING/CASE MANAGEMENT/SOCIAL WORK - NSDCPEFALRISK_GEN_ALL_CORE
For information on Fall & Injury Prevention, visit: [email or web address] OR  [email or web address] OR  https://www.cdc.gov/steadi/patient.html

## 2024-03-14 NOTE — DISCHARGE NOTE NURSING/CASE MANAGEMENT/SOCIAL WORK - NSTOBACCONEVERSMOKERY/N_GEN_A
Skin Problem     Leg Pain      63-year-old female history of end-stage renal disease on dialysis presents to ER with complaint of right lower leg pain. Patient initially bit by insect on her left leg 2 days ago. She c/o having left leg pain and developing of an abscess . No other complaint. She states her tetanus is up to date. Past Medical History:   Diagnosis Date    Adenomatous polyp     CHF (congestive heart failure) (HCC)     Chronic kidney disease     TTHS    Dialysis patient ([facility]     tues, thurs, sat    ESRD (end stage renal disease) ([facility] [facility]     Family hx of colon cancer     Mother    GI bleed due to NSAIDs     Heart failure ([facility] [address] [facility]     History of blood transfusion     Hypertension     LUIS (obstructive sleep apnea)     no cpap       Past Surgical History:   Procedure Laterality Date    COLONOSCOPY N/A 9/27/2019    COLONOSCOPY with polyp bx performed by Jose Elias Jameson MD at [address] HX APPENDECTOMY       [address] HX COLONOSCOPY  09/21/2016    colonoscopy, Dr. Kelsey Blandon, [address] [place] [address]  05/2020    HX OTHER SURGICAL       ARTERIOVENOUS FISTULA INSERTION LEFT ARM     HX TOTAL ABDOMINAL HYSTERECTOMY      HX TOTAL COLECTOMY  10/11/2016    LAPAROSCOPIC COLECTOMY PARTIAL RIGHT FOR CECAL POLYP, DR. PAEZ, [facility]    HX TUBAL LIGATION      VASCULAR SURGERY PROCEDURE UNLIST      left arm graft         Family History:   Problem Relation Age of Onset    Colon Cancer Mother     Colon Polyps Mother        Social History     Socioeconomic History    Marital status:      Spouse name: Not on file    Number of children: Not on file    Years of education: Not on file    Highest education level: Not on file   Occupational History    Not on file   Tobacco Use    Smoking status: Former    Smokeless tobacco: Never   Substance and Sexual Activity    Alcohol use: No    Drug use: No    Sexual activity: Yes     Partners: Male   Other Topics Concern    Not on file   Social History Narrative    Not on file     Social Determinants of Health     Financial Resource Strain: Not on file   Food Insecurity: Not on file   Transportation Needs: Not on file   Physical Activity: Not on file   Stress: Not on file   Social Connections: Not on file   Intimate Partner Violence: Not on file   Housing Stability: Not on file         ALLERGIES: Patient has no known allergies. Review of Systems   Constitutional: Negative. HENT: Negative. Eyes: Negative. Respiratory: Negative. Cardiovascular: Negative. Gastrointestinal: Negative. Endocrine: Negative. Genitourinary: Negative. Musculoskeletal: Negative. Skin: Negative. Allergic/Immunologic: Negative. Neurological: Negative. Hematological: Negative. Psychiatric/Behavioral: Negative. All other systems reviewed and are negative. Vitals:    10/07/22 2139 10/07/22 2355   BP: (!) 176/123 (!) 165/95   Pulse: 96 94   Resp: 20 18   Temp: 98.4 °F (36.9 °C) 98.5 °F (36.9 °C)   SpO2: 100% 100%   Weight: 74.8 kg (165 lb)    Height: 5' 2\" (1.575 m)             Physical Exam  Vitals and nursing note reviewed. Constitutional:       General: She is not in acute distress. Appearance: She is well-developed. She is not diaphoretic. HENT:      Head: Normocephalic. Right Ear: External ear normal.      Left Ear: External ear normal.      Mouth/Throat:      Pharynx: No oropharyngeal exudate. Eyes:      General: No scleral icterus. Right eye: No discharge. Left eye: No discharge. Conjunctiva/sclera: Conjunctivae normal.      Pupils: Pupils are equal, round, and reactive to light. Neck:      Thyroid: No thyromegaly. Vascular: No JVD. Trachea: No tracheal deviation. Cardiovascular:      Rate and Rhythm: Normal rate and regular rhythm. Heart sounds: Normal heart sounds. No murmur heard. No friction rub. No gallop.    Pulmonary:      Effort: Pulmonary effort is normal. No respiratory distress. Breath sounds: Normal breath sounds. No stridor. No wheezing or rales. Chest:      Chest wall: No tenderness. Abdominal:      General: Bowel sounds are normal. There is no distension. Palpations: Abdomen is soft. There is no mass. Tenderness: There is no abdominal tenderness. There is no guarding or rebound. Musculoskeletal:         General: No tenderness. Normal range of motion. Cervical back: Normal range of motion and neck supple. Comments: RIGHT MID LATERAL LOWER LEG: (+) 1 cm hyperpigment round area of tenderness. Normal ROM, pulses and sensory. Lymphadenopathy:      Cervical: No cervical adenopathy. Skin:     General: Skin is warm and dry. Coloration: Skin is not pale. Findings: No erythema or rash. Neurological:      Mental Status: She is alert and oriented to person, place, and time. Cranial Nerves: No cranial nerve deficit. Motor: No abnormal muscle tone. Coordination: Coordination normal.      Deep Tendon Reflexes: Reflexes normal.        MDM         Procedures    Dx: insect bite, cellulitis right leg    Disp: D/c home. Rx: clindomycin, vicodin. F/U PCP in 2 days. Return to ER prm    Dictation disclaimer:  Please note that this dictation was completed with [email or web address], the computer voice recognition software. Quite often unanticipated grammatical, syntax, homophones, and other interpretive errors are inadvertently transcribed by the computer software. Please disregard these errors. Please excuse any errors that have escaped final proofreading. No

## 2024-03-14 NOTE — PROGRESS NOTE ADULT - SUBJECTIVE AND OBJECTIVE BOX
03-13-24 @ 19:33    ORTHO-TOTAL JOINT SERVICE      SIMEON CABRAL    [id number]    History: 83y Female is status post right total knee arthroplasty on 3/13/24, POD # 0. Patient is doing well and is comfortable. The patient's pain is controlled using the prescribed pain medications. The patient is participating in physical therapy. Patient requesting regular diet. Denies nausea, vomiting, chest pain, shortness of breath, abdominal pain or fever. No new complaints.        MEDICATIONS  (STANDING):  acetaminophen     Tablet .. 975 milliGRAM(s) Oral every 8 hours  acetaminophen   IVPB .. 1000 milliGRAM(s) IV Intermittent once  amLODIPine   Tablet 5 milliGRAM(s) Oral daily  atorvastatin 40 milliGRAM(s) Oral at bedtime  ceFAZolin  Injectable. 2000 milliGRAM(s) IV Push <User Schedule>  ketorolac   Injectable 15 milliGRAM(s) IV Push every 6 hours  metoprolol succinate  milliGRAM(s) Oral daily  pantoprazole    Tablet 40 milliGRAM(s) Oral before breakfast  senna 2 Tablet(s) Oral at bedtime  sodium chloride 0.9% Bolus 500 milliLiter(s) IV Bolus once  sodium chloride 0.9%. 1000 milliLiter(s) (125 mL/Hr) IV Continuous <Continuous>    MEDICATIONS  (PRN):  ALPRAZolam 1 milliGRAM(s) Oral two times a day PRN anxiety  HYDROmorphone   Tablet 4 milliGRAM(s) Oral every 3 hours PRN Severe Pain (7 - 10)  magnesium hydroxide Suspension 30 milliLiter(s) Oral daily PRN Constipation  ondansetron Injectable 4 milliGRAM(s) IV Push every 6 hours PRN Nausea and/or Vomiting  oxyCODONE    IR 5 milliGRAM(s) Oral every 3 hours PRN Mild Pain (1 - 3)  oxyCODONE    IR 10 milliGRAM(s) Oral every 3 hours PRN Moderate Pain (4 - 6)  zolpidem 5 milliGRAM(s) Oral at bedtime PRN Insomnia  zolpidem 5 milliGRAM(s) Oral at bedtime PRN Insomnia      Physical exam: The right knee dressing is clean, dry and intact. No drainage or discharge. No erythema is noted. No blistering. No ecchymosis. The calf is supple nontender. No calf tenderness. Sensation to light touch is grossly intact distally. Motor function distally is 5/5. Extensor hallucis longus and flexor hallucis longus are intact. No foot drop. 2+ dorsalis pedis pulse. Capillary refill is less than 2 seconds. No cyanosis.    Primary Orthopedic Assessment:  • s/p RIGHT total knee replacement    Secondary  Orthopedic Assessment(s):   •     Secondary  Medical Assessment(s):   •     Plan:   • DVT prophylaxis as prescribed, including use of compression devices and ankle pumps  • Continue physical therapy  • Weightbearing as tolerated of the right lower extremity with assistance of a walker  • Incentive spirometry encouraged  • Pain control as clinically indicated  • Discharge planning – anticipated discharge is Home    
   SIMEON CABRAL  [id number]    History: 83y Female is status post right total knee arthroplasty POD #1. Patient is doing well and is comfortable. The patient's pain is controlled using the prescribed pain medications. The patient is participating in physical therapy. Denies CP, SOB, dizziness, HA, fever/chills, numbness or tingling. No new complaints.    Vital Signs Last 24 Hrs  T(C): 36.6 (14 Mar 2024 05:08), Max: 36.6 (13 Mar 2024 10:08)  T(F): 97.9 (14 Mar 2024 05:08), Max: 97.9 (13 Mar 2024 21:19)  HR: 100 (14 Mar 2024 05:08) (58 - 103)  BP: 150/85 (14 Mar 2024 05:08) (100/60 - 154/76)  BP(mean): --  RR: 18 (14 Mar 2024 05:08) (15 - 20)  SpO2: 93% (14 Mar 2024 05:08) (93% - 100%)    Parameters below as of 14 Mar 2024 05:08  Patient On (Oxygen Delivery Method): room air                              12.7   11.93 )-----------( 243      ( 14 Mar 2024 06:38 )             40.8     03-14    140  |  105  |  15.8  ----------------------------<  189<H>  4.2   |  20.0<L>  |  0.88    Ca    8.4      14 Mar 2024 06:38        General: Alert, awake, NAD  Physical exam: The right knee dressing is clean, dry and intact. No drainage, discharge, erythema, blistering or ecchymosis noted.   The calf is supple nontender b/l.   SILT. +AT/GC/EHL/FHL.   2+ DP pulse. BCR. No cyanosis.    Plan:   - DVT prophylaxis as prescribed, including use of compression devices and ankle pumps  - Continue physical therapy  - Weight bearing status of surgical extremity: WBAT  - Incentive spirometry encouraged  - Pain control as clinically indicated  - Discharge planning – anticipated discharge is Home today when cleared by PT and Medicine  
CC: Right total knee arthroplasty   (13 Mar 2024 11:18)    HPI:  82 y/o female with Hx of lung cancer s/p radiation, CAD s/p CABG x 3, HTN, HLD, carotid stenosis s/p left endarterectomy and anxiety, she has longstanding right knee pain, over the past 6 months pain has been progressively worsening, she has tried conservative treatments including PT, gel and cortisone injections, last injection about 6 months ago with no relieve she came in here for elective right SABA total knee replacement with Dr. Díaz on 3/13/24 s/p procedure.    INTERVAL HPI/OVERNIGHT EVENTS: Patient seen and examined sitting up in the chair.  Pain controlled.  Tolerated PT. Patient denies any headache, dizziness, SOB, CP, abdominal pain, nausea, vomiting, dysuria.  Other ROS reviewed and are negative.    Vital Signs Last 24 Hrs  T(C): 36.4 (14 Mar 2024 09:20), Max: 36.6 (13 Mar 2024 10:08)  T(F): 97.6 (14 Mar 2024 09:20), Max: 97.9 (13 Mar 2024 21:19)  HR: 87 (14 Mar 2024 09:20) (58 - 103)  BP: 124/76 (14 Mar 2024 09:20) (100/60 - 154/76)  BP(mean): --  RR: 18 (14 Mar 2024 09:20) (15 - 20)  SpO2: 92% (14 Mar 2024 09:20) (92% - 100%)    Parameters below as of 14 Mar 2024 09:20  Patient On (Oxygen Delivery Method): room air      I&O's Detail    13 Mar 2024 07:01  -  14 Mar 2024 07:00  --------------------------------------------------------  IN:    Lactated Ringers Bolus: 500 mL    sodium chloride 0.9%: 125 mL    Sodium Chloride 0.9% Bolus: 500 mL  Total IN: 1125 mL    OUT:    Voided (mL): 2200 mL  Total OUT: 2200 mL    Total NET: -1075 mL    PHYSICAL EXAM:  GENERAL: NAD  HEAD:  Atraumatic, Normocephalic  EYES: conjunctiva and sclera clear  NECK: Supple, No JVD, Normal thyroid  NERVOUS SYSTEM:  Alert & Oriented X3, Good concentration; Motor Strength 5/5 B/L upper and lower extremities  CHEST/LUNG: Clear to auscultation bilaterally  HEART: Regular rate and rhythm; No murmurs, rubs, or gallops  ABDOMEN: Soft, Nontender, Nondistended; Bowel sounds present  EXTREMITIES:  2+ Peripheral Pulses, Right knee with clean dressing  SKIN: No rashes or lesions                            12.7   11.93 )-----------( 243      ( 14 Mar 2024 06:38 )             40.8     14 Mar 2024 06:38    140    |  105    |  15.8   ----------------------------<  189    4.2     |  20.0   |  0.88     Ca    8.4        14 Mar 2024 06:38      Urinalysis Basic - ( 14 Mar 2024 06:38 )    Color: x / Appearance: x / SG: x / pH: x  Gluc: 189 mg/dL / Ketone: x  / Bili: x / Urobili: x   Blood: x / Protein: x / Nitrite: x   Leuk Esterase: x / RBC: x / WBC x   Sq Epi: x / Non Sq Epi: x / Bacteria: x        MEDICATIONS  (STANDING):  acetaminophen     Tablet .. 975 milliGRAM(s) Oral every 8 hours  amLODIPine   Tablet 5 milliGRAM(s) Oral daily  aspirin enteric coated 81 milliGRAM(s) Oral two times a day  atorvastatin 40 milliGRAM(s) Oral at bedtime  clopidogrel Tablet 75 milliGRAM(s) Oral daily  ketorolac   Injectable 15 milliGRAM(s) IV Push every 6 hours  metoprolol succinate  milliGRAM(s) Oral daily  pantoprazole    Tablet 40 milliGRAM(s) Oral before breakfast  senna 2 Tablet(s) Oral at bedtime  sodium chloride 0.9% Bolus 500 milliLiter(s) IV Bolus once    MEDICATIONS  (PRN):  ALPRAZolam 1 milliGRAM(s) Oral two times a day PRN anxiety  HYDROmorphone   Tablet 4 milliGRAM(s) Oral every 3 hours PRN Severe Pain (7 - 10)  magnesium hydroxide Suspension 30 milliLiter(s) Oral daily PRN Constipation  ondansetron Injectable 4 milliGRAM(s) IV Push every 6 hours PRN Nausea and/or Vomiting  oxyCODONE    IR 5 milliGRAM(s) Oral every 3 hours PRN Mild Pain (1 - 3)  oxyCODONE    IR 10 milliGRAM(s) Oral every 3 hours PRN Moderate Pain (4 - 6)  zolpidem 5 milliGRAM(s) Oral at bedtime PRN Insomnia  zolpidem 5 milliGRAM(s) Oral at bedtime PRN Insomnia      RADIOLOGY & ADDITIONAL TESTS:  < from: Xray Knee 1 or 2 Views, Right (03.13.24 @ 15:49) >  ACC: [id number] EXAM:  XR KNEE 1-2 VIEWS RT   ORDERED BY: EDGAR DÍAZ     PROCEDURE DATE:  03/13/2024          INTERPRETATION:  HISTORY: Postoperative  knee replacement.    TECHNIQUE: Two views of the RIGHT knee are submitted.    FINDINGS: Status post tricompartmental knee replacement with the femoral,   tibial and patellar components in anatomic alignment.  There is no fracture .    IMPRESSION:  Knee prosthetic components in proper anatomical alignment.    --- End of Report ---            KB HASTINGS MD; Attending Radiologist  This document has been electronically signed. Mar 13 2024  5:16PM    < end of copied text >

## 2024-03-14 NOTE — PROGRESS NOTE ADULT - ASSESSMENT
84 y/o female with Hx of lung cancer s/p radiation 9/2023, CAD s/p CABG x 3, HTN, HLD, carotid stenosis s/p left endarterectomy and anxiety, she has longstanding right knee pain, over the past 6 months pain has been progressively worsening, she has tried conservative treatments including PT, gel and cortisone injections, last injection about 6 months ago with no relieve she came in here for elective right SABA total knee replacement with Dr. Díaz on 3/13/24 s/p procedure.       Plan:     OA of the Right knee s/p TKR post op day 1:     - post op no complications  - VS stable  - abx per ortho   - c/w anti hypertensive to be restarted post op day 02 except if blood pressure goes >150 systolic   - c/w IVF x 24 hrs then reassess per ortho  - opiate induced constipation regimen   - encouraging incentive spirometry   -c/w local wound care per ortho   -DVT prophylaxis and Pain meds as per Ortho team   -PT/OT and weight bearing per ortho   -cyclobenzaprine 10 mg once a day    CAD: will continue with aspirin 81 mg once a day will resume Plavix 75 mg once a day once ok from ortho team     HTN: will continue with metoprolol succinate 100 mg once a day, amLODIPine 5 mg once a day and lisinopril 40 mg once a day    Anxiety: will continue with ALPRAZolam 1 mg 2 times a day, As Needed    Insomnia: Will continue with zolpidem 10 mg once a day (at bedtime)    Hx of HLD: will continue with atorvastatin 40 mg once a day    Leukocytosis: Likely reactive, Asymptomatic, afebrile.    Given hx of Ca lung patient is at high risk for DVT, would recommend Lovenox sc or Eliquis for DVT prophylaxis.     Patient medically stable for discharge if cleared by PT.

## 2024-03-15 RX ORDER — CELECOXIB 200 MG/1
1 CAPSULE ORAL
Qty: 42 | Refills: 0
Start: 2024-03-15 | End: 2024-04-04

## 2024-03-22 RX ORDER — ONDANSETRON 4 MG/1
4 TABLET ORAL EVERY 8 HOURS
Qty: 15 | Refills: 0 | Status: ACTIVE | COMMUNITY
Start: 2024-03-22 | End: 1900-01-01

## 2024-03-28 RX ORDER — OXYCODONE 10 MG/1
10 TABLET ORAL
Qty: 42 | Refills: 0 | Status: ACTIVE | COMMUNITY
Start: 2024-03-20 | End: 1900-01-01

## 2024-04-01 ENCOUNTER — RX RENEWAL (OUTPATIENT)
Age: 84
End: 2024-04-01

## 2024-04-01 RX ORDER — OXYCODONE 5 MG/1
5 TABLET ORAL EVERY 6 HOURS
Qty: 28 | Refills: 0 | Status: ACTIVE | COMMUNITY
Start: 2024-04-01 | End: 1900-01-01

## 2024-04-01 RX ORDER — CELECOXIB 200 MG/1
200 CAPSULE ORAL
Qty: 30 | Refills: 3 | Status: ACTIVE | COMMUNITY
Start: 2021-08-06 | End: 1900-01-01

## 2024-04-03 PROBLEM — I65.29 CAROTID ARTERY STENOSIS: Status: ACTIVE | Noted: 2023-11-06

## 2024-04-03 NOTE — REVIEW OF SYSTEMS
[Arthralgias] : arthralgias [Joint Stiffness] : joint stiffness [FreeTextEntry9] : b/l knee pain [Negative] : Heme/Lymph

## 2024-04-03 NOTE — PHYSICAL EXAM
[JVD] : no jugular venous distention  [Carotid Bruits] : no carotid bruits [Normal Rate and Rhythm] : normal rate and rhythm [Respiratory Effort] : normal respiratory effort [Right Carotid Bruit] : no bruit heard over the right carotid [Left Carotid Bruit] : no bruit heard over the left carotid [2+] : left 2+ [Ankle Swelling (On Exam)] : not present [Abdomen Masses] : No abdominal masses [Abdomen Tenderness] : ~T ~M No abdominal tenderness [No Rash or Lesion] : No rash or lesion [Oriented to Person] : oriented to person [Alert] : alert [Oriented to Time] : oriented to time [Oriented to Place] : oriented to place [Calm] : calm [de-identified] : no neck stiffness, FROM [de-identified] : NAD [de-identified] : FROM of all 4 extremities

## 2024-04-03 NOTE — HISTORY OF PRESENT ILLNESS
[de-identified] : s/p L TCAR on 12/12/23 Doing well  No complaints Compliant with ASA and Plavix [FreeTextEntry1] : 82F with history of hypertension, CAD status post CABG, COPD, cardiomyopathy, obstructive sleep apnea, CKD stage III, and lung adenocarcinoma stage 1A s/p SBRT, presenting for evaluation after being found to have 90% L ICA stenosis on recent CT angiogram neck after recent fall. Patient was recently hospitalized s/p fall at home - reports knees gave out. She denies symptoms of carotid stenosis including unilateral weakness, amaurosis fugax, or hx of stroke. Patient currently awaiting follow up imaging for lung tumor and if RT fails, she is agreeable to undergo surgical intervention to remove the mass (VATS) which was offered by Dr. Paz. She states she is experiencing severe bilateral knee pain secondary to OA, and would like knee replacement surgery in the future, as well.  She is able to climb stairs without shortness of breath but continues to complain of bilateral knee pain.

## 2024-04-03 NOTE — ASSESSMENT
[FreeTextEntry1] : 82F with history of hypertension, CAD status post CABG, COPD, cardiomyopathy, obstructive sleep apnea, CKD stage III, and lung adenocarcinoma stage 1A s/p SBRT, presenting for evaluation after being found to have 90%, asymptomatic L ICA stenosis on recent CT angiogram neck after recent fall.  s/p L TCAR on 12/12/24; uneventful  Carotid duplex performed in office today demonstrates patent L carotid stent without evidence of re-stenosis   Plan Patient will follow up in 3 months for carotid duplex Continue ASA and Statin daily Ok to hold Plavix for R knee surgery in the near future   [Medication Management] : medication management

## 2024-04-09 ENCOUNTER — APPOINTMENT (OUTPATIENT)
Dept: ORTHOPEDIC SURGERY | Facility: CLINIC | Age: 84
End: 2024-04-09
Payer: MEDICARE

## 2024-04-09 VITALS
HEIGHT: 64 IN | BODY MASS INDEX: 28 KG/M2 | HEART RATE: 78 BPM | DIASTOLIC BLOOD PRESSURE: 78 MMHG | WEIGHT: 164 LBS | SYSTOLIC BLOOD PRESSURE: 120 MMHG

## 2024-04-09 PROCEDURE — 99024 POSTOP FOLLOW-UP VISIT: CPT

## 2024-04-09 PROCEDURE — 73562 X-RAY EXAM OF KNEE 3: CPT | Mod: RT

## 2024-04-09 NOTE — HISTORY OF PRESENT ILLNESS
[de-identified] : S/P Right robotic-assisted total knee replacement with SABA; DOS: 03/13/2024. [de-identified] : SIMEON CABRAL is doing well 3 week s/p right total knee replacement. She is participating in outpatient physical therapy. Her pain level is controlled. She is taking aspirin 81 mg twice a day for DVT prophylaxis. Overall, she is very happy with the results of the surgery so far.		  [de-identified] : Exam of the right knee shows -3 to 106 degrees of flexion measured with a goniometer.  5/5 motor strength bilaterally distally. Sensation intact distally.		  [de-identified] : X-ray: 3 views of the right knee demonstrate a total knee replacement in good alignment with a well centered patella, without evidence of loosening or subsidence, and no fracture.		  [de-identified] : The patient is doing very well 3 weeks after right TKR. A prescription for Tramadol was prescribed. The patient will continue outpatient physical therapy.  The patient will continue aspirin 81 mg twice per day for DVT prophylaxis for the next 3 weeks. The importance of physical therapy and achieving full knee extension as well as progressing knee flexion was reinforced. Overall the patient is very happy with their outcome so far. Follow-up in 3 weeks with repeat x-rays

## 2024-04-09 NOTE — ADDENDUM
[FreeTextEntry1] : This note was authored by Dax Deleon working as a medical scribe for Dr. Lul Díaz. The note was reviewed, edited, and revised by Dr. Lul Díaz whom is in agreement with the exam findings, imaging findings, and treatment plan. 04/09/2024

## 2024-04-12 ENCOUNTER — APPOINTMENT (OUTPATIENT)
Dept: CT IMAGING | Facility: CLINIC | Age: 84
End: 2024-04-12

## 2024-04-22 RX ORDER — OXYCODONE 5 MG/1
5 TABLET ORAL EVERY 6 HOURS
Qty: 28 | Refills: 0 | Status: ACTIVE | COMMUNITY
Start: 2024-04-22 | End: 1900-01-01

## 2024-04-24 RX ORDER — OXYCODONE 5 MG/1
5 TABLET ORAL
Qty: 42 | Refills: 0 | Status: ACTIVE | COMMUNITY
Start: 2024-04-24 | End: 1900-01-01

## 2024-04-25 ENCOUNTER — APPOINTMENT (OUTPATIENT)
Dept: VASCULAR SURGERY | Facility: CLINIC | Age: 84
End: 2024-04-25

## 2024-05-01 ENCOUNTER — APPOINTMENT (OUTPATIENT)
Dept: FAMILY MEDICINE | Facility: CLINIC | Age: 84
End: 2024-05-01
Payer: MEDICARE

## 2024-05-01 DIAGNOSIS — R19.7 DIARRHEA, UNSPECIFIED: ICD-10-CM

## 2024-05-01 PROCEDURE — 99441: CPT

## 2024-05-01 RX ORDER — CHOLESTYRAMINE 4 G/9G
4 POWDER, FOR SUSPENSION ORAL TWICE DAILY
Qty: 1 | Refills: 0 | Status: ACTIVE | COMMUNITY
Start: 2024-05-01 | End: 1900-01-01

## 2024-05-10 ENCOUNTER — NON-APPOINTMENT (OUTPATIENT)
Age: 84
End: 2024-05-10

## 2024-05-10 ENCOUNTER — APPOINTMENT (OUTPATIENT)
Dept: FAMILY MEDICINE | Facility: CLINIC | Age: 84
End: 2024-05-10

## 2024-05-13 ENCOUNTER — RX RENEWAL (OUTPATIENT)
Age: 84
End: 2024-05-13

## 2024-05-13 RX ORDER — LISINOPRIL 40 MG/1
40 TABLET ORAL
Qty: 90 | Refills: 0 | Status: ACTIVE | COMMUNITY
Start: 2017-01-12 | End: 1900-01-01

## 2024-05-13 RX ORDER — CYCLOBENZAPRINE HYDROCHLORIDE 10 MG/1
10 TABLET, FILM COATED ORAL 3 TIMES DAILY
Qty: 270 | Refills: 0 | Status: ACTIVE | COMMUNITY
Start: 2021-12-23 | End: 1900-01-01

## 2024-05-14 ENCOUNTER — APPOINTMENT (OUTPATIENT)
Dept: ORTHOPEDIC SURGERY | Facility: CLINIC | Age: 84
End: 2024-05-14

## 2024-05-15 ENCOUNTER — NON-APPOINTMENT (OUTPATIENT)
Age: 84
End: 2024-05-15

## 2024-05-17 RX ORDER — OXYCODONE 5 MG/1
5 TABLET ORAL
Qty: 30 | Refills: 0 | Status: ACTIVE | COMMUNITY
Start: 2024-05-17 | End: 1900-01-01

## 2024-05-29 NOTE — ASU PREOP CHECKLIST - NS PREOP CHK CHLOROHEX WASH
Pt wife called & lm.  She wanted to know what he could eat.  She said she understood no wheat.  Wanted a call back.      I called and asked if she had the new pt packet?  It does have a lot of things to not eat and also a page that has an example of a \"shopping\" list.  She did see that sheet and voiced understanding.  Wanted to know what dairy he could have?  She was confused about it.  I suggested she start reading labels, if she sees carrageenan in the ingredients do not buy.  Explained it is a thickener, without nutritional value.  It may be a cancer causing ingredient, however I do not see that listed in the new patient packet.  The best thing is to not eat anything with it.    Breyers ice cream is ok, that I do know.  Suggested that he get his labs done and Dr. Silvestre can review more of the questions .  She voiced understanding.   at home:

## 2024-05-31 ENCOUNTER — NON-APPOINTMENT (OUTPATIENT)
Age: 84
End: 2024-05-31

## 2024-05-31 RX ORDER — ZOLPIDEM TARTRATE 10 MG/1
10 TABLET ORAL
Qty: 30 | Refills: 0 | Status: ACTIVE | COMMUNITY
Start: 2022-07-12 | End: 1900-01-01

## 2024-05-31 RX ORDER — ALPRAZOLAM 1 MG/1
1 TABLET ORAL
Qty: 60 | Refills: 0 | Status: ACTIVE | COMMUNITY
Start: 2018-05-03 | End: 1900-01-01

## 2024-06-03 ENCOUNTER — APPOINTMENT (OUTPATIENT)
Dept: CT IMAGING | Facility: CLINIC | Age: 84
End: 2024-06-03
Payer: MEDICARE

## 2024-06-03 ENCOUNTER — OUTPATIENT (OUTPATIENT)
Dept: OUTPATIENT SERVICES | Facility: HOSPITAL | Age: 84
LOS: 1 days | End: 2024-06-03
Payer: MEDICARE

## 2024-06-03 DIAGNOSIS — Z95.1 PRESENCE OF AORTOCORONARY BYPASS GRAFT: Chronic | ICD-10-CM

## 2024-06-03 DIAGNOSIS — Z98.49 CATARACT EXTRACTION STATUS, UNSPECIFIED EYE: Chronic | ICD-10-CM

## 2024-06-03 DIAGNOSIS — Z98.890 OTHER SPECIFIED POSTPROCEDURAL STATES: Chronic | ICD-10-CM

## 2024-06-03 DIAGNOSIS — C34.90 MALIGNANT NEOPLASM OF UNSPECIFIED PART OF UNSPECIFIED BRONCHUS OR LUNG: ICD-10-CM

## 2024-06-03 DIAGNOSIS — K62.89 OTHER SPECIFIED DISEASES OF ANUS AND RECTUM: Chronic | ICD-10-CM

## 2024-06-03 PROCEDURE — 71250 CT THORAX DX C-: CPT | Mod: 26

## 2024-06-03 PROCEDURE — 71250 CT THORAX DX C-: CPT

## 2024-06-04 ENCOUNTER — APPOINTMENT (OUTPATIENT)
Dept: ORTHOPEDIC SURGERY | Facility: CLINIC | Age: 84
End: 2024-06-04
Payer: MEDICARE

## 2024-06-04 VITALS
BODY MASS INDEX: 26.63 KG/M2 | HEIGHT: 64 IN | OXYGEN SATURATION: 99 % | SYSTOLIC BLOOD PRESSURE: 126 MMHG | HEART RATE: 74 BPM | DIASTOLIC BLOOD PRESSURE: 72 MMHG | WEIGHT: 156 LBS

## 2024-06-04 DIAGNOSIS — Z96.659 AFTERCARE FOLLOWING JOINT REPLACEMENT SURGERY: ICD-10-CM

## 2024-06-04 DIAGNOSIS — Z96.651 PRESENCE OF RIGHT ARTIFICIAL KNEE JOINT: ICD-10-CM

## 2024-06-04 DIAGNOSIS — Z47.1 AFTERCARE FOLLOWING JOINT REPLACEMENT SURGERY: ICD-10-CM

## 2024-06-04 PROCEDURE — 99024 POSTOP FOLLOW-UP VISIT: CPT

## 2024-06-04 PROCEDURE — 73562 X-RAY EXAM OF KNEE 3: CPT | Mod: RT

## 2024-06-04 NOTE — ADDENDUM
[FreeTextEntry1] :  This note was authored by DILCIA BURNETT working as a medical scribe for Dr. Lul Díaz. The note was reviewed, edited, and revised by Dr. Lul Díaz whom is in agreement with the exam findings, imaging findings, and treatment plan. 06/04/2024

## 2024-06-04 NOTE — HISTORY OF PRESENT ILLNESS
[de-identified] : S/P Right robotic-assisted total knee replacement with SABA; DOS: 03/13/2024. [de-identified] :  SIMEON CABRAL is a 83 year female 3 months s/p right TKR. She was hospitalized recently for colitis in April for 9 days. She reports being again in the hospital a second time after this for DVT in left leg and a PE in Mid-May. Upon discharge she was given a prescription for Eliquis. She continues home physical therapy. She has returned to daily activities of life without significant pain or discomfort. Overall, she is very happy with the result of the surgery so far despite her medical complications. [de-identified] :  Exam of the right knee shows a well healed incision, -5 to 128 degrees of flexion measured with a goniometer. There is no effusion. 5/5 motor strength bilaterally distally. Sensation intact distally. [de-identified] :  X-ray: 3 views of the right knee demonstrate a total knee replacement in good alignment with a well centered patella, without evidence of loosening or subsidence, and no fracture. [de-identified] :  The patient is doing very well 3 months following right total knee replacement. Despite her recent hospitalizations, the patient has progressed her  range of motion. The patient was given a new prescription for physical therapy which she will now do on an outpatient basis. Overall she is making excellent progress and is very happy with the result so far. Dental prophylaxis was reviewed. Follow up one year post op for radiographic surveillance.

## 2024-06-11 ENCOUNTER — RX RENEWAL (OUTPATIENT)
Age: 84
End: 2024-06-11

## 2024-06-11 ENCOUNTER — APPOINTMENT (OUTPATIENT)
Dept: FAMILY MEDICINE | Facility: CLINIC | Age: 84
End: 2024-06-11
Payer: MEDICARE

## 2024-06-11 VITALS
WEIGHT: 155 LBS | BODY MASS INDEX: 26.46 KG/M2 | TEMPERATURE: 97.2 F | DIASTOLIC BLOOD PRESSURE: 72 MMHG | HEIGHT: 64 IN | OXYGEN SATURATION: 95 % | HEART RATE: 86 BPM | SYSTOLIC BLOOD PRESSURE: 114 MMHG

## 2024-06-11 DIAGNOSIS — D64.9 ANEMIA, UNSPECIFIED: ICD-10-CM

## 2024-06-11 DIAGNOSIS — I82.409 ACUTE EMBOLISM AND THROMBOSIS OF UNSPECIFIED DEEP VEINS OF UNSPECIFIED LOWER EXTREMITY: ICD-10-CM

## 2024-06-11 PROCEDURE — 99214 OFFICE O/P EST MOD 30 MIN: CPT

## 2024-06-11 RX ORDER — CLOPIDOGREL BISULFATE 75 MG/1
75 TABLET, FILM COATED ORAL
Qty: 90 | Refills: 1 | Status: COMPLETED | COMMUNITY
Start: 2023-11-06 | End: 2024-06-11

## 2024-06-11 RX ORDER — APIXABAN 5 MG/1
5 TABLET, FILM COATED ORAL
Qty: 180 | Refills: 1 | Status: ACTIVE | COMMUNITY
Start: 2024-06-11 | End: 1900-01-01

## 2024-06-11 RX ORDER — METOPROLOL SUCCINATE 100 MG/1
100 TABLET, EXTENDED RELEASE ORAL
Qty: 90 | Refills: 1 | Status: ACTIVE | COMMUNITY
Start: 2017-01-12 | End: 1900-01-01

## 2024-06-11 NOTE — ASSESSMENT
[FreeTextEntry1] : LLE DVT - cont eliquis 5mg bid. samples given to pt until pt receives refill. Advised pt to call office before she runs out of medication anemia - check cbc, fe panel

## 2024-06-11 NOTE — HISTORY OF PRESENT ILLNESS
[Post-hospitalization from ___ Hospital] : Post-hospitalization from [unfilled] Hospital [Admitted on: ___] : The patient was admitted on [unfilled] [Discharged on ___] : discharged on [unfilled] [Discharge Summary] : discharge summary [Pertinent Labs] : pertinent labs [Discharge Med List] : discharge medication list [Med Reconciliation] : medication reconciliation has been completed [FreeTextEntry2] : Pt was sent to [facility] for lethargy, weakness. Pt was admitted for dehydration, DVT. Pt found to have LLE DVT in hospital and pneumonia on imaging. Started on eliquis 5mg bid. Pt tolerating med well. Pt also was anemic on discharge, hb 10.9 on 5/14 Repeat CT chest on 6/3/24 showed no PE.

## 2024-06-12 RX ORDER — OXYCODONE 5 MG/1
5 TABLET ORAL
Qty: 30 | Refills: 0 | Status: ACTIVE | COMMUNITY
Start: 2024-06-12 | End: 1900-01-01

## 2024-06-14 ENCOUNTER — APPOINTMENT (OUTPATIENT)
Dept: RADIATION ONCOLOGY | Facility: CLINIC | Age: 84
End: 2024-06-14
Payer: MEDICARE

## 2024-06-14 PROCEDURE — 99442: CPT

## 2024-06-14 NOTE — REASON FOR VISIT
[Home] : at home, [unfilled] , at the time of the visit. [Medical Office: ([facility])___] : at the medical office located in  [Verbal consent obtained from patient] : the patient, [unfilled] [Routine Follow-Up] : routine follow-up visit for [Lung Cancer] : lung cancer

## 2024-06-14 NOTE — HISTORY OF PRESENT ILLNESS
[FreeTextEntry1] : This is an 83 year old female diagnosed with lung cancer in May 2023 referred by Dr. Elam.   She presented to [facility] in May 2023 s/p fall.   Inpatient CT A/P performed on 5/22/23 showed bilateral pulmonary nodules with the largest measuring 2.4 x 2.4 x 3.3 cm in the right lower lung. Right hilar lymphadenopathy and mediastinal lymphadenopathy concerning for primary pulmonary malignancy.   Brain MRI was negative as per note.   CT guided lung biopsy of right lung nodule performed on 5/26/23 showed adenocarcinoma with cribriform and acinar patterns.   PET scan performed on 6/19/23 showed FDG avid right pulmonary nodule, measuring 2.4 x 2.1 cm, consistent with known malignancy. Bilateral upper lobe pulmonary nodules below resolution of PET. No distant mets.   She had a consultation with [facility] and was recommended to undergo Cyberknife treatment.   She had a consultation with Dr. Elam on 8/1/23 who has referred her for consderation of SBRT.   She had a consultation with Dr. Paz on 8/9/23 who offered a right VATS, robotic assisted right lower lobe wedge resection.  Sharmin is unsure, however, if she wants to proceed with surgery.  She reports SOB on exertion, occasional caught, denies productive cough.  Her main complaints are of back pain and bilateral knee pain from DJD.  She was assessed to have a newly diagnosed adenoca of Right lung, stage IA, [id number]. Patient presented with an asymptomatic right lung nodule, progressively enlarging, FDG avid, and biopsy proven to be malignant. She was offered VATS wedge resection but is reluctant to undergo surgery. Sharmin prefers a nonoperative approach.  She completed SBRT to the right lung, total dose of 5000 cGy in 5 fx, on 9/21/23.   CT Chest performed on 12/22/23 showed a 1.5 cm solid nodule within superior segment of right lower lobe is decrease, previously 2.3 cm. A 0.9 cm nodule within right lower lobe is new from prior exam. Multiple other bilateral ground-glass and solid nodules are unchanged.  At her 3 month follow up-. Feeling well. Slight SOB and coughing noted. She saw Dr. Newman in November 2023.  To undergo knee replacement soon.  6/14/24 Routine followup visit.  Sharmin reports feeling well.  No specific complaints. She had CT chest on 6/3/24 which showed: Evolving postradiation changes to the superior segment right lower lobe with increased consolidation and groundglass opacity obscuring both the treated lesion as well as the previously new nodule. Other solid, subsolid and groundglass nodules are unchanged. Continued surveillance is recommended.

## 2024-06-18 LAB
BASOPHILS # BLD AUTO: 0.07 K/UL
BASOPHILS NFR BLD AUTO: 0.8 %
EOSINOPHIL # BLD AUTO: 0.17 K/UL
EOSINOPHIL NFR BLD AUTO: 2 %
FERRITIN SERPL-MCNC: 108 NG/ML
HCT VFR BLD CALC: 38.8 %
HGB BLD-MCNC: 11.5 G/DL
IMM GRANULOCYTES NFR BLD AUTO: 0.4 %
IRON SATN MFR SERPL: 11 %
IRON SERPL-MCNC: 37 UG/DL
LYMPHOCYTES # BLD AUTO: 2.72 K/UL
LYMPHOCYTES NFR BLD AUTO: 32.7 %
MAN DIFF?: NORMAL
MCHC RBC-ENTMCNC: 23.5 PG
MCHC RBC-ENTMCNC: 29.6 GM/DL
MCV RBC AUTO: 79.2 FL
MONOCYTES # BLD AUTO: 0.53 K/UL
MONOCYTES NFR BLD AUTO: 6.4 %
NEUTROPHILS # BLD AUTO: 4.8 K/UL
NEUTROPHILS NFR BLD AUTO: 57.7 %
PLATELET # BLD AUTO: 301 K/UL
RBC # BLD: 4.9 M/UL
RBC # FLD: 18.1 %
TIBC SERPL-MCNC: 322 UG/DL
UIBC SERPL-MCNC: 285 UG/DL
WBC # FLD AUTO: 8.32 K/UL

## 2024-07-25 ENCOUNTER — RX RENEWAL (OUTPATIENT)
Age: 84
End: 2024-07-25

## 2024-08-13 RX ORDER — OXYCODONE 5 MG/1
5 TABLET ORAL
Qty: 20 | Refills: 0 | Status: ACTIVE | COMMUNITY
Start: 2024-08-13 | End: 1900-01-01

## 2024-09-16 ENCOUNTER — APPOINTMENT (OUTPATIENT)
Dept: FAMILY MEDICINE | Facility: CLINIC | Age: 84
End: 2024-09-16
Payer: MEDICARE

## 2024-09-16 VITALS
HEART RATE: 87 BPM | BODY MASS INDEX: 26.98 KG/M2 | WEIGHT: 158 LBS | OXYGEN SATURATION: 95 % | DIASTOLIC BLOOD PRESSURE: 80 MMHG | HEIGHT: 64 IN | SYSTOLIC BLOOD PRESSURE: 140 MMHG | TEMPERATURE: 98 F

## 2024-09-16 DIAGNOSIS — Z23 ENCOUNTER FOR IMMUNIZATION: ICD-10-CM

## 2024-09-16 DIAGNOSIS — I82.409 ACUTE EMBOLISM AND THROMBOSIS OF UNSPECIFIED DEEP VEINS OF UNSPECIFIED LOWER EXTREMITY: ICD-10-CM

## 2024-09-16 DIAGNOSIS — D64.9 ANEMIA, UNSPECIFIED: ICD-10-CM

## 2024-09-16 DIAGNOSIS — G89.29 LOW BACK PAIN, UNSPECIFIED: ICD-10-CM

## 2024-09-16 DIAGNOSIS — I25.10 ATHEROSCLEROTIC HEART DISEASE OF NATIVE CORONARY ARTERY W/OUT ANGINA PECTORIS: ICD-10-CM

## 2024-09-16 DIAGNOSIS — I10 ESSENTIAL (PRIMARY) HYPERTENSION: ICD-10-CM

## 2024-09-16 DIAGNOSIS — M54.50 LOW BACK PAIN, UNSPECIFIED: ICD-10-CM

## 2024-09-16 PROCEDURE — G2211 COMPLEX E/M VISIT ADD ON: CPT

## 2024-09-16 PROCEDURE — G0008: CPT

## 2024-09-16 PROCEDURE — 99214 OFFICE O/P EST MOD 30 MIN: CPT

## 2024-09-16 PROCEDURE — 90662 IIV NO PRSV INCREASED AG IM: CPT

## 2024-09-16 RX ORDER — GABAPENTIN 100 MG/1
100 CAPSULE ORAL
Qty: 180 | Refills: 0 | Status: ACTIVE | COMMUNITY
Start: 2024-09-16 | End: 1900-01-01

## 2024-09-19 NOTE — PHYSICAL EXAM
[Normal] : normal rate, regular rhythm, normal S1 and S2 and no murmur heard [Soft] : abdomen soft [Non Tender] : non-tender [de-identified] : low back pain b/l lknee pain

## 2024-09-19 NOTE — PHYSICAL EXAM
[Normal] : normal rate, regular rhythm, normal S1 and S2 and no murmur heard [Soft] : abdomen soft [Non Tender] : non-tender [de-identified] : low back pain b/l lknee pain

## 2024-09-19 NOTE — ASSESSMENT
[FreeTextEntry1] : chronic low back pain, knee pain - trial of gabapentin. celebrex prn hx dvt - eliquis. check cbc htn controlled Risks and benefits of high dose flu vaccine discussed w/ pt. Consent given by pt, high dose flu vaccine administered. Pt tolerated well

## 2024-09-19 NOTE — HISTORY OF PRESENT ILLNESS
[de-identified] : Pt has hx of LLE DVT in hospital and pneumonia in 05/2024. Started on eliquis 5mg bid. Pt tolerating med well. Pt also was anemic on discharge, hb 10.9 on 5/14. Repeat CT chest on 6/3/24 showed no PE. Pt to see vascular Dr. Crawford in 10/2024.  CT angio showed chronic small R thalamic lacunar infarct and severe LICA stenosis 90%.  Pt c/o Pt has chronic lumbar stenosis w/ neurogenic claudication. Pt was seeing spine pain mgmt is s/p multiple epidurals which failed and then went to neurosx Dr. Varma s/p laminectomy in 12/2021 with minimal improvement in pain. Pt still has low back pain and unstable gait. Patient going to PT 1-2x a week with no improvement.  Pt has chronic anxiety and insomnia. Pt uses xanax prn which has helped control anxiety symptoms. Failed ambien in the past.

## 2024-09-19 NOTE — HISTORY OF PRESENT ILLNESS
[de-identified] : Pt has hx of LLE DVT in hospital and pneumonia in 05/2024. Started on eliquis 5mg bid. Pt tolerating med well. Pt also was anemic on discharge, hb 10.9 on 5/14. Repeat CT chest on 6/3/24 showed no PE. Pt to see vascular Dr. Crawford in 10/2024.  CT angio showed chronic small R thalamic lacunar infarct and severe LICA stenosis 90%.  Pt c/o Pt has chronic lumbar stenosis w/ neurogenic claudication. Pt was seeing spine pain mgmt is s/p multiple epidurals which failed and then went to neurosx Dr. Varma s/p laminectomy in 12/2021 with minimal improvement in pain. Pt still has low back pain and unstable gait. Patient going to PT 1-2x a week with no improvement.  Pt has chronic anxiety and insomnia. Pt uses xanax prn which has helped control anxiety symptoms. Failed ambien in the past.

## 2024-09-26 ENCOUNTER — RX RENEWAL (OUTPATIENT)
Age: 84
End: 2024-09-26

## 2024-09-29 NOTE — PHYSICAL EXAM
[General Appearance - Well Developed] : well developed [Normal Appearance] : normal appearance [Well Groomed] : well groomed [General Appearance - Well Nourished] : well nourished [No Deformities] : no deformities [General Appearance - In No Acute Distress] : no acute distress [Normal Conjunctiva] : the conjunctiva exhibited no abnormalities [Eyelids - No Xanthelasma] : the eyelids demonstrated no xanthelasmas [Normal Oral Mucosa] : normal oral mucosa [No Oral Pallor] : no oral pallor [No Oral Cyanosis] : no oral cyanosis [Respiration, Rhythm And Depth] : normal respiratory rhythm and effort [Exaggerated Use Of Accessory Muscles For Inspiration] : no accessory muscle use [Auscultation Breath Sounds / Voice Sounds] : lungs were clear to auscultation bilaterally [Heart Rate And Rhythm] : heart rate and rhythm were normal [Heart Sounds] : normal S1 and S2 [Murmurs] : no murmurs present [Arterial Pulses Normal] : the arterial pulses were normal [Edema] : no peripheral edema present [Bowel Sounds] : normal bowel sounds [Abdomen Soft] : soft [Abdomen Tenderness] : non-tender [Abnormal Walk] : normal gait [Nail Clubbing] : no clubbing of the fingernails [Skin Color & Pigmentation] : normal skin color and pigmentation [Skin Turgor] : normal skin turgor [] : no rash [Oriented To Time, Place, And Person] : oriented to person, place, and time [Impaired Insight] : insight and judgment were intact [Affect] : the affect was normal [No Anxiety] : not feeling anxious [FreeTextEntry1] : Mid-line incision scar healing well None known

## 2024-10-07 ENCOUNTER — RX RENEWAL (OUTPATIENT)
Age: 84
End: 2024-10-07

## 2024-10-25 ENCOUNTER — APPOINTMENT (OUTPATIENT)
Dept: VASCULAR SURGERY | Facility: CLINIC | Age: 84
End: 2024-10-25

## 2024-10-25 PROCEDURE — 99214 OFFICE O/P EST MOD 30 MIN: CPT

## 2024-10-25 PROCEDURE — 93970 EXTREMITY STUDY: CPT

## 2024-11-04 ENCOUNTER — RX RENEWAL (OUTPATIENT)
Age: 84
End: 2024-11-04

## 2024-12-12 ENCOUNTER — APPOINTMENT (OUTPATIENT)
Dept: RADIATION ONCOLOGY | Facility: CLINIC | Age: 84
End: 2024-12-12
Payer: MEDICARE

## 2024-12-12 VITALS
RESPIRATION RATE: 18 BRPM | WEIGHT: 169 LBS | HEART RATE: 91 BPM | BODY MASS INDEX: 29.01 KG/M2 | DIASTOLIC BLOOD PRESSURE: 53 MMHG | OXYGEN SATURATION: 98 % | SYSTOLIC BLOOD PRESSURE: 142 MMHG

## 2024-12-12 PROCEDURE — 99214 OFFICE O/P EST MOD 30 MIN: CPT

## 2024-12-12 PROCEDURE — G2211 COMPLEX E/M VISIT ADD ON: CPT

## 2024-12-20 ENCOUNTER — RX RENEWAL (OUTPATIENT)
Age: 84
End: 2024-12-20

## 2024-12-23 ENCOUNTER — APPOINTMENT (OUTPATIENT)
Dept: CT IMAGING | Facility: CLINIC | Age: 84
End: 2024-12-23
Payer: MEDICARE

## 2024-12-23 ENCOUNTER — OUTPATIENT (OUTPATIENT)
Dept: OUTPATIENT SERVICES | Facility: HOSPITAL | Age: 84
LOS: 1 days | End: 2024-12-23
Payer: MEDICARE

## 2024-12-23 DIAGNOSIS — Z98.49 CATARACT EXTRACTION STATUS, UNSPECIFIED EYE: Chronic | ICD-10-CM

## 2024-12-23 DIAGNOSIS — Z95.1 PRESENCE OF AORTOCORONARY BYPASS GRAFT: Chronic | ICD-10-CM

## 2024-12-23 DIAGNOSIS — Z98.890 OTHER SPECIFIED POSTPROCEDURAL STATES: Chronic | ICD-10-CM

## 2024-12-23 DIAGNOSIS — K62.89 OTHER SPECIFIED DISEASES OF ANUS AND RECTUM: Chronic | ICD-10-CM

## 2024-12-23 DIAGNOSIS — Z00.8 ENCOUNTER FOR OTHER GENERAL EXAMINATION: ICD-10-CM

## 2024-12-23 PROCEDURE — 71250 CT THORAX DX C-: CPT

## 2024-12-23 PROCEDURE — 71250 CT THORAX DX C-: CPT | Mod: 26

## 2024-12-30 ENCOUNTER — RX RENEWAL (OUTPATIENT)
Age: 84
End: 2024-12-30

## 2024-12-30 ENCOUNTER — APPOINTMENT (OUTPATIENT)
Dept: FAMILY MEDICINE | Facility: CLINIC | Age: 84
End: 2024-12-30
Payer: MEDICARE

## 2024-12-30 VITALS
HEIGHT: 64 IN | TEMPERATURE: 97.6 F | BODY MASS INDEX: 28.85 KG/M2 | OXYGEN SATURATION: 98 % | DIASTOLIC BLOOD PRESSURE: 80 MMHG | SYSTOLIC BLOOD PRESSURE: 132 MMHG | WEIGHT: 169 LBS | HEART RATE: 87 BPM

## 2024-12-30 DIAGNOSIS — M54.16 RADICULOPATHY, LUMBAR REGION: ICD-10-CM

## 2024-12-30 DIAGNOSIS — M47.816 SPONDYLOSIS W/OUT MYELOPATHY OR RADICULOPATHY, LUMBAR REGION: ICD-10-CM

## 2024-12-30 DIAGNOSIS — B02.8 ZOSTER WITH OTHER COMPLICATIONS: ICD-10-CM

## 2024-12-30 PROCEDURE — 99214 OFFICE O/P EST MOD 30 MIN: CPT

## 2024-12-30 RX ORDER — PREDNISONE 20 MG/1
20 TABLET ORAL
Qty: 15 | Refills: 0 | Status: ACTIVE | COMMUNITY
Start: 2024-12-30 | End: 1900-01-01

## 2024-12-30 RX ORDER — VALACYCLOVIR 1 G/1
1 TABLET, FILM COATED ORAL 3 TIMES DAILY
Qty: 24 | Refills: 0 | Status: ACTIVE | COMMUNITY
Start: 2024-12-30 | End: 1900-01-01

## 2025-01-01 ENCOUNTER — TRANSCRIPTION ENCOUNTER (OUTPATIENT)
Age: 85
End: 2025-01-01

## 2025-01-07 ENCOUNTER — APPOINTMENT (OUTPATIENT)
Dept: RADIATION ONCOLOGY | Facility: CLINIC | Age: 85
End: 2025-01-07
Payer: MEDICARE

## 2025-01-07 ENCOUNTER — APPOINTMENT (OUTPATIENT)
Dept: FAMILY MEDICINE | Facility: CLINIC | Age: 85
End: 2025-01-07

## 2025-01-07 PROBLEM — B02.9 SHINGLES: Status: ACTIVE | Noted: 2025-01-07

## 2025-01-07 PROCEDURE — 99214 OFFICE O/P EST MOD 30 MIN: CPT

## 2025-01-07 PROCEDURE — 99212 OFFICE O/P EST SF 10 MIN: CPT | Mod: 93

## 2025-01-07 PROCEDURE — G2211 COMPLEX E/M VISIT ADD ON: CPT

## 2025-01-10 RX ORDER — LIDOCAINE 5% 700 MG/1
5 PATCH TOPICAL
Qty: 1 | Refills: 2 | Status: ACTIVE | COMMUNITY
Start: 2025-01-07

## 2025-01-15 PROCEDURE — 77333 RADIATION TREATMENT AID(S): CPT

## 2025-01-15 PROCEDURE — 77263 THER RADIOLOGY TX PLNG CPLX: CPT

## 2025-01-15 PROCEDURE — 77470 SPECIAL RADIATION TREATMENT: CPT

## 2025-01-16 ENCOUNTER — APPOINTMENT (OUTPATIENT)
Dept: FAMILY MEDICINE | Facility: CLINIC | Age: 85
End: 2025-01-16

## 2025-01-16 ENCOUNTER — NON-APPOINTMENT (OUTPATIENT)
Age: 85
End: 2025-01-16

## 2025-01-16 VITALS
SYSTOLIC BLOOD PRESSURE: 128 MMHG | HEART RATE: 88 BPM | WEIGHT: 170 LBS | DIASTOLIC BLOOD PRESSURE: 70 MMHG | OXYGEN SATURATION: 92 % | BODY MASS INDEX: 29.02 KG/M2 | HEIGHT: 64 IN | TEMPERATURE: 97 F

## 2025-01-16 DIAGNOSIS — B02.9 ZOSTER W/OUT COMPLICATIONS: ICD-10-CM

## 2025-01-16 DIAGNOSIS — M48.062 SPINAL STENOSIS, LUMBAR REGION WITH NEUROGENIC CLAUDICATION: ICD-10-CM

## 2025-01-16 DIAGNOSIS — G47.00 INSOMNIA, UNSPECIFIED: ICD-10-CM

## 2025-01-16 PROCEDURE — 99214 OFFICE O/P EST MOD 30 MIN: CPT

## 2025-01-16 RX ORDER — GABAPENTIN 300 MG/1
300 CAPSULE ORAL
Qty: 60 | Refills: 1 | Status: COMPLETED | COMMUNITY
Start: 2025-01-07 | End: 2025-01-16

## 2025-01-16 RX ORDER — OXYCODONE 5 MG/1
5 TABLET ORAL EVERY 8 HOURS
Qty: 30 | Refills: 0 | Status: ACTIVE | COMMUNITY
Start: 2025-01-16 | End: 1900-01-01

## 2025-01-17 PROCEDURE — 77338 DESIGN MLC DEVICE FOR IMRT: CPT

## 2025-01-17 PROCEDURE — 77293 RESPIRATOR MOTION MGMT SIMUL: CPT

## 2025-01-17 PROCEDURE — 77300 RADIATION THERAPY DOSE PLAN: CPT

## 2025-01-17 PROCEDURE — 77301 RADIOTHERAPY DOSE PLAN IMRT: CPT

## 2025-01-29 PROCEDURE — 77373 STRTCTC BDY RAD THER TX DLVR: CPT

## 2025-01-31 ENCOUNTER — NON-APPOINTMENT (OUTPATIENT)
Age: 85
End: 2025-01-31

## 2025-01-31 PROCEDURE — 77373 STRTCTC BDY RAD THER TX DLVR: CPT

## 2025-02-04 PROCEDURE — 77373 STRTCTC BDY RAD THER TX DLVR: CPT

## 2025-02-06 PROCEDURE — 77373 STRTCTC BDY RAD THER TX DLVR: CPT

## 2025-02-10 ENCOUNTER — NON-APPOINTMENT (OUTPATIENT)
Age: 85
End: 2025-02-10

## 2025-02-10 PROCEDURE — 77373 STRTCTC BDY RAD THER TX DLVR: CPT

## 2025-02-10 PROCEDURE — 77336 RADIATION PHYSICS CONSULT: CPT

## 2025-02-10 PROCEDURE — 77435 SBRT MANAGEMENT: CPT

## 2025-02-24 ENCOUNTER — APPOINTMENT (OUTPATIENT)
Dept: PHYSICAL MEDICINE AND REHAB | Facility: CLINIC | Age: 85
End: 2025-02-24

## 2025-03-10 ENCOUNTER — APPOINTMENT (OUTPATIENT)
Dept: PHYSICAL MEDICINE AND REHAB | Facility: CLINIC | Age: 85
End: 2025-03-10
Payer: MEDICARE

## 2025-03-10 VITALS
RESPIRATION RATE: 15 BRPM | OXYGEN SATURATION: 98 % | HEART RATE: 102 BPM | HEIGHT: 64 IN | WEIGHT: 168 LBS | DIASTOLIC BLOOD PRESSURE: 88 MMHG | BODY MASS INDEX: 28.68 KG/M2 | SYSTOLIC BLOOD PRESSURE: 132 MMHG

## 2025-03-10 DIAGNOSIS — Z98.890 OTHER SPECIFIED POSTPROCEDURAL STATES: ICD-10-CM

## 2025-03-10 DIAGNOSIS — G89.29 LOW BACK PAIN, UNSPECIFIED: ICD-10-CM

## 2025-03-10 DIAGNOSIS — M54.50 LOW BACK PAIN, UNSPECIFIED: ICD-10-CM

## 2025-03-10 PROCEDURE — 99204 OFFICE O/P NEW MOD 45 MIN: CPT

## 2025-03-10 PROCEDURE — G2211 COMPLEX E/M VISIT ADD ON: CPT

## 2025-03-14 RX ORDER — METHYLPREDNISOLONE 4 MG/1
4 TABLET ORAL
Qty: 21 | Refills: 0 | Status: ACTIVE | COMMUNITY
Start: 2025-03-14 | End: 1900-01-01

## 2025-03-24 ENCOUNTER — APPOINTMENT (OUTPATIENT)
Dept: CT IMAGING | Facility: CLINIC | Age: 85
End: 2025-03-24
Payer: MEDICARE

## 2025-03-24 ENCOUNTER — APPOINTMENT (OUTPATIENT)
Dept: MRI IMAGING | Facility: CLINIC | Age: 85
End: 2025-03-24
Payer: MEDICARE

## 2025-03-24 ENCOUNTER — OUTPATIENT (OUTPATIENT)
Dept: OUTPATIENT SERVICES | Facility: HOSPITAL | Age: 85
LOS: 1 days | End: 2025-03-24
Payer: MEDICARE

## 2025-03-24 DIAGNOSIS — K62.89 OTHER SPECIFIED DISEASES OF ANUS AND RECTUM: Chronic | ICD-10-CM

## 2025-03-24 DIAGNOSIS — C34.90 MALIGNANT NEOPLASM OF UNSPECIFIED PART OF UNSPECIFIED BRONCHUS OR LUNG: ICD-10-CM

## 2025-03-24 DIAGNOSIS — M54.02 PANNICULITIS AFFECTING REGIONS OF NECK AND BACK, CERVICAL REGION: ICD-10-CM

## 2025-03-24 DIAGNOSIS — M47.816 SPONDYLOSIS WITHOUT MYELOPATHY OR RADICULOPATHY, LUMBAR REGION: ICD-10-CM

## 2025-03-24 DIAGNOSIS — M48.062 SPINAL STENOSIS, LUMBAR REGION WITH NEUROGENIC CLAUDICATION: ICD-10-CM

## 2025-03-24 DIAGNOSIS — Z95.1 PRESENCE OF AORTOCORONARY BYPASS GRAFT: Chronic | ICD-10-CM

## 2025-03-24 DIAGNOSIS — Z98.49 CATARACT EXTRACTION STATUS, UNSPECIFIED EYE: Chronic | ICD-10-CM

## 2025-03-24 DIAGNOSIS — Z98.890 OTHER SPECIFIED POSTPROCEDURAL STATES: Chronic | ICD-10-CM

## 2025-03-24 PROCEDURE — 71250 CT THORAX DX C-: CPT

## 2025-03-24 PROCEDURE — 71250 CT THORAX DX C-: CPT | Mod: 26

## 2025-03-24 PROCEDURE — 72148 MRI LUMBAR SPINE W/O DYE: CPT | Mod: 26

## 2025-03-24 PROCEDURE — 72148 MRI LUMBAR SPINE W/O DYE: CPT

## 2025-03-27 ENCOUNTER — APPOINTMENT (OUTPATIENT)
Dept: PHYSICAL MEDICINE AND REHAB | Facility: CLINIC | Age: 85
End: 2025-03-27
Payer: MEDICARE

## 2025-03-27 DIAGNOSIS — M48.062 SPINAL STENOSIS, LUMBAR REGION WITH NEUROGENIC CLAUDICATION: ICD-10-CM

## 2025-03-27 PROCEDURE — 99214 OFFICE O/P EST MOD 30 MIN: CPT

## 2025-03-28 ENCOUNTER — APPOINTMENT (OUTPATIENT)
Dept: RADIATION ONCOLOGY | Facility: CLINIC | Age: 85
End: 2025-03-28

## 2025-03-28 NOTE — ASSESSMENT
This RN called patient today on his room phone times 2 , no answer.  I then called his cell phone times 2 no answer and unable to leave a message.  I then called his significant other Jessica, she confirmed that Prasanna would like to stop radiation therapy treatment for 3 weeks.  She states currently he is 107 lbs, not eating and he is on TPN.  I explained it to Jessica that it is not advisable to stop/hold radiation therapy treatment.  Radiation treatment works better without interruption, she voiced understanding, and she will relay the information to Prasanna christensen when she goes to the hospital to visit him.   [FreeTextEntry1] : chronic low back pain - declines PT. refer to pain mgmt for possible local injection, MRI lumbar ordered\par UTI - start macrobid. urine cx\par htn- BP improved today, cont metoprolol

## 2025-04-10 ENCOUNTER — APPOINTMENT (OUTPATIENT)
Dept: FAMILY MEDICINE | Facility: CLINIC | Age: 85
End: 2025-04-10
Payer: MEDICARE

## 2025-04-10 VITALS
TEMPERATURE: 97.1 F | DIASTOLIC BLOOD PRESSURE: 70 MMHG | WEIGHT: 169 LBS | BODY MASS INDEX: 28.85 KG/M2 | HEART RATE: 94 BPM | HEIGHT: 64 IN | OXYGEN SATURATION: 99 % | SYSTOLIC BLOOD PRESSURE: 122 MMHG

## 2025-04-10 DIAGNOSIS — G47.00 INSOMNIA, UNSPECIFIED: ICD-10-CM

## 2025-04-10 DIAGNOSIS — I25.10 ATHEROSCLEROTIC HEART DISEASE OF NATIVE CORONARY ARTERY W/OUT ANGINA PECTORIS: ICD-10-CM

## 2025-04-10 DIAGNOSIS — F41.9 ANXIETY DISORDER, UNSPECIFIED: ICD-10-CM

## 2025-04-10 DIAGNOSIS — N28.1 CYST OF KIDNEY, ACQUIRED: ICD-10-CM

## 2025-04-10 PROCEDURE — 99214 OFFICE O/P EST MOD 30 MIN: CPT

## 2025-04-10 PROCEDURE — G2211 COMPLEX E/M VISIT ADD ON: CPT

## 2025-04-16 RX ORDER — SEMAGLUTIDE 0.68 MG/ML
2 INJECTION, SOLUTION SUBCUTANEOUS
Qty: 1 | Refills: 2 | Status: ACTIVE | COMMUNITY
Start: 2025-04-10

## 2025-04-18 ENCOUNTER — APPOINTMENT (OUTPATIENT)
Dept: FAMILY MEDICINE | Facility: CLINIC | Age: 85
End: 2025-04-18
Payer: MEDICARE

## 2025-04-18 DIAGNOSIS — N39.0 URINARY TRACT INFECTION, SITE NOT SPECIFIED: ICD-10-CM

## 2025-04-18 PROCEDURE — 99213 OFFICE O/P EST LOW 20 MIN: CPT | Mod: 93

## 2025-04-18 RX ORDER — SULFAMETHOXAZOLE AND TRIMETHOPRIM 800; 160 MG/1; MG/1
800-160 TABLET ORAL
Qty: 10 | Refills: 1 | Status: ACTIVE | COMMUNITY
Start: 2025-04-18 | End: 1900-01-01

## 2025-04-28 ENCOUNTER — OUTPATIENT (OUTPATIENT)
Dept: OUTPATIENT SERVICES | Facility: HOSPITAL | Age: 85
LOS: 1 days | End: 2025-04-28
Payer: MEDICARE

## 2025-04-28 ENCOUNTER — APPOINTMENT (OUTPATIENT)
Dept: ULTRASOUND IMAGING | Facility: CLINIC | Age: 85
End: 2025-04-28
Payer: MEDICARE

## 2025-04-28 DIAGNOSIS — Z98.890 OTHER SPECIFIED POSTPROCEDURAL STATES: Chronic | ICD-10-CM

## 2025-04-28 DIAGNOSIS — K62.89 OTHER SPECIFIED DISEASES OF ANUS AND RECTUM: Chronic | ICD-10-CM

## 2025-04-28 DIAGNOSIS — Z98.49 CATARACT EXTRACTION STATUS, UNSPECIFIED EYE: Chronic | ICD-10-CM

## 2025-04-28 DIAGNOSIS — N28.1 CYST OF KIDNEY, ACQUIRED: ICD-10-CM

## 2025-04-28 DIAGNOSIS — Z95.1 PRESENCE OF AORTOCORONARY BYPASS GRAFT: Chronic | ICD-10-CM

## 2025-04-28 PROCEDURE — 76775 US EXAM ABDO BACK WALL LIM: CPT | Mod: 26

## 2025-04-28 PROCEDURE — 76775 US EXAM ABDO BACK WALL LIM: CPT

## 2025-05-19 ENCOUNTER — APPOINTMENT (OUTPATIENT)
Dept: PAIN MANAGEMENT | Facility: CLINIC | Age: 85
End: 2025-05-19
Payer: MEDICARE

## 2025-05-19 VITALS — HEIGHT: 64 IN | WEIGHT: 172 LBS | BODY MASS INDEX: 29.37 KG/M2

## 2025-05-19 DIAGNOSIS — Z98.890 OTHER SPECIFIED POSTPROCEDURAL STATES: ICD-10-CM

## 2025-05-19 DIAGNOSIS — M54.16 RADICULOPATHY, LUMBAR REGION: ICD-10-CM

## 2025-05-19 DIAGNOSIS — M54.50 LOW BACK PAIN, UNSPECIFIED: ICD-10-CM

## 2025-05-19 DIAGNOSIS — G89.29 LOW BACK PAIN, UNSPECIFIED: ICD-10-CM

## 2025-05-19 DIAGNOSIS — M48.062 SPINAL STENOSIS, LUMBAR REGION WITH NEUROGENIC CLAUDICATION: ICD-10-CM

## 2025-05-19 DIAGNOSIS — M47.816 SPONDYLOSIS W/OUT MYELOPATHY OR RADICULOPATHY, LUMBAR REGION: ICD-10-CM

## 2025-05-19 PROCEDURE — 99214 OFFICE O/P EST MOD 30 MIN: CPT

## 2025-06-06 ENCOUNTER — NON-APPOINTMENT (OUTPATIENT)
Age: 85
End: 2025-06-06

## 2025-06-10 ENCOUNTER — APPOINTMENT (OUTPATIENT)
Dept: ORTHOPEDIC SURGERY | Facility: AMBULATORY SURGERY CENTER | Age: 85
End: 2025-06-10
Payer: MEDICARE

## 2025-06-10 ENCOUNTER — APPOINTMENT (OUTPATIENT)
Dept: ORTHOPEDIC SURGERY | Facility: AMBULATORY SURGERY CENTER | Age: 85
End: 2025-06-10

## 2025-06-10 PROCEDURE — 62323 NJX INTERLAMINAR LMBR/SAC: CPT

## 2025-06-26 ENCOUNTER — APPOINTMENT (OUTPATIENT)
Dept: PAIN MANAGEMENT | Facility: CLINIC | Age: 85
End: 2025-06-26

## 2025-06-30 ENCOUNTER — APPOINTMENT (OUTPATIENT)
Dept: CT IMAGING | Facility: CLINIC | Age: 85
End: 2025-06-30
Payer: MEDICARE

## 2025-06-30 ENCOUNTER — OUTPATIENT (OUTPATIENT)
Dept: OUTPATIENT SERVICES | Facility: HOSPITAL | Age: 85
LOS: 1 days | End: 2025-06-30
Payer: MEDICARE

## 2025-06-30 DIAGNOSIS — C34.90 MALIGNANT NEOPLASM OF UNSPECIFIED PART OF UNSPECIFIED BRONCHUS OR LUNG: ICD-10-CM

## 2025-06-30 DIAGNOSIS — Z95.1 PRESENCE OF AORTOCORONARY BYPASS GRAFT: Chronic | ICD-10-CM

## 2025-06-30 DIAGNOSIS — Z98.49 CATARACT EXTRACTION STATUS, UNSPECIFIED EYE: Chronic | ICD-10-CM

## 2025-06-30 DIAGNOSIS — Z98.890 OTHER SPECIFIED POSTPROCEDURAL STATES: Chronic | ICD-10-CM

## 2025-06-30 DIAGNOSIS — K62.89 OTHER SPECIFIED DISEASES OF ANUS AND RECTUM: Chronic | ICD-10-CM

## 2025-06-30 PROCEDURE — 71250 CT THORAX DX C-: CPT

## 2025-06-30 PROCEDURE — 71250 CT THORAX DX C-: CPT | Mod: 26

## 2025-07-03 ENCOUNTER — NON-APPOINTMENT (OUTPATIENT)
Age: 85
End: 2025-07-03

## 2025-07-09 ENCOUNTER — APPOINTMENT (OUTPATIENT)
Dept: RADIATION ONCOLOGY | Facility: CLINIC | Age: 85
End: 2025-07-09
Payer: MEDICARE

## 2025-07-09 PROCEDURE — 99213 OFFICE O/P EST LOW 20 MIN: CPT | Mod: 2W

## 2025-07-09 PROCEDURE — G2211 COMPLEX E/M VISIT ADD ON: CPT | Mod: 2W

## 2025-07-31 ENCOUNTER — APPOINTMENT (OUTPATIENT)
Dept: PAIN MANAGEMENT | Facility: CLINIC | Age: 85
End: 2025-07-31
Payer: MEDICARE

## 2025-07-31 VITALS — BODY MASS INDEX: 29.53 KG/M2 | HEIGHT: 64 IN | WEIGHT: 173 LBS

## 2025-07-31 DIAGNOSIS — M48.062 SPINAL STENOSIS, LUMBAR REGION WITH NEUROGENIC CLAUDICATION: ICD-10-CM

## 2025-07-31 DIAGNOSIS — Z98.890 OTHER SPECIFIED POSTPROCEDURAL STATES: ICD-10-CM

## 2025-07-31 DIAGNOSIS — M47.816 SPONDYLOSIS W/OUT MYELOPATHY OR RADICULOPATHY, LUMBAR REGION: ICD-10-CM

## 2025-07-31 DIAGNOSIS — M43.17 SPONDYLOLISTHESIS, LUMBOSACRAL REGION: ICD-10-CM

## 2025-07-31 PROCEDURE — 99213 OFFICE O/P EST LOW 20 MIN: CPT

## 2025-08-26 ENCOUNTER — APPOINTMENT (OUTPATIENT)
Dept: FAMILY MEDICINE | Facility: CLINIC | Age: 85
End: 2025-08-26
Payer: MEDICARE

## 2025-08-26 VITALS
HEIGHT: 64 IN | TEMPERATURE: 97.3 F | DIASTOLIC BLOOD PRESSURE: 90 MMHG | SYSTOLIC BLOOD PRESSURE: 150 MMHG | WEIGHT: 170 LBS | BODY MASS INDEX: 29.02 KG/M2 | HEART RATE: 96 BPM | RESPIRATION RATE: 15 BRPM | OXYGEN SATURATION: 96 %

## 2025-08-26 VITALS — SYSTOLIC BLOOD PRESSURE: 136 MMHG | DIASTOLIC BLOOD PRESSURE: 86 MMHG

## 2025-08-26 DIAGNOSIS — N18.31 CHRONIC KIDNEY DISEASE, STAGE 3A: ICD-10-CM

## 2025-08-26 DIAGNOSIS — D64.9 ANEMIA, UNSPECIFIED: ICD-10-CM

## 2025-08-26 DIAGNOSIS — Z00.00 ENCOUNTER FOR GENERAL ADULT MEDICAL EXAMINATION W/OUT ABNORMAL FINDINGS: ICD-10-CM

## 2025-08-26 DIAGNOSIS — I25.10 ATHEROSCLEROTIC HEART DISEASE OF NATIVE CORONARY ARTERY W/OUT ANGINA PECTORIS: ICD-10-CM

## 2025-08-26 DIAGNOSIS — C34.90 MALIGNANT NEOPLASM OF UNSPECIFIED PART OF UNSPECIFIED BRONCHUS OR LUNG: ICD-10-CM

## 2025-08-26 DIAGNOSIS — Z95.1 PRESENCE OF AORTOCORONARY BYPASS GRAFT: ICD-10-CM

## 2025-08-26 PROCEDURE — G0439: CPT

## 2025-08-30 LAB
ALBUMIN SERPL ELPH-MCNC: 4.5 G/DL
ALP BLD-CCNC: 125 U/L
ALT SERPL-CCNC: 24 U/L
ANION GAP SERPL CALC-SCNC: 14 MMOL/L
APPEARANCE: CLEAR
AST SERPL-CCNC: 20 U/L
BACTERIA: NEGATIVE /HPF
BASOPHILS # BLD AUTO: 0.07 K/UL
BASOPHILS NFR BLD AUTO: 0.7 %
BILIRUB SERPL-MCNC: 0.3 MG/DL
BILIRUBIN URINE: NEGATIVE
BLOOD URINE: NEGATIVE
BUN SERPL-MCNC: 17 MG/DL
CALCIUM SERPL-MCNC: 9.6 MG/DL
CAST: 0 /LPF
CHLORIDE SERPL-SCNC: 106 MMOL/L
CHOLEST SERPL-MCNC: 150 MG/DL
CO2 SERPL-SCNC: 24 MMOL/L
COLOR: YELLOW
CREAT SERPL-MCNC: 0.83 MG/DL
EGFRCR SERPLBLD CKD-EPI 2021: 70 ML/MIN/1.73M2
EOSINOPHIL # BLD AUTO: 0.35 K/UL
EOSINOPHIL NFR BLD AUTO: 3.6 %
EPITHELIAL CELLS: 3 /HPF
ESTIMATED AVERAGE GLUCOSE: 140 MG/DL
GLUCOSE QUALITATIVE U: NEGATIVE MG/DL
GLUCOSE SERPL-MCNC: 110 MG/DL
HBA1C MFR BLD HPLC: 6.5 %
HCT VFR BLD CALC: 47.6 %
HDLC SERPL-MCNC: 50 MG/DL
HGB BLD-MCNC: 14.5 G/DL
IMM GRANULOCYTES NFR BLD AUTO: 0.5 %
KETONES URINE: NEGATIVE MG/DL
LDLC SERPL-MCNC: 77 MG/DL
LEUKOCYTE ESTERASE URINE: ABNORMAL
LYMPHOCYTES # BLD AUTO: 2.33 K/UL
LYMPHOCYTES NFR BLD AUTO: 23.9 %
MAN DIFF?: NORMAL
MCHC RBC-ENTMCNC: 26 PG
MCHC RBC-ENTMCNC: 30.5 G/DL
MCV RBC AUTO: 85.5 FL
MICROSCOPIC-UA: NORMAL
MONOCYTES # BLD AUTO: 0.6 K/UL
MONOCYTES NFR BLD AUTO: 6.2 %
NEUTROPHILS # BLD AUTO: 6.33 K/UL
NEUTROPHILS NFR BLD AUTO: 65.1 %
NITRITE URINE: NEGATIVE
NONHDLC SERPL-MCNC: 100 MG/DL
PH URINE: 6
PLATELET # BLD AUTO: 308 K/UL
POTASSIUM SERPL-SCNC: 5.7 MMOL/L
PROT SERPL-MCNC: 7.7 G/DL
PROTEIN URINE: NORMAL MG/DL
RBC # BLD: 5.57 M/UL
RBC # FLD: 14.9 %
RED BLOOD CELLS URINE: 4 /HPF
SODIUM SERPL-SCNC: 144 MMOL/L
SPECIFIC GRAVITY URINE: 1.02
T4 FREE SERPL-MCNC: 1.2 NG/DL
TRIGL SERPL-MCNC: 125 MG/DL
TSH SERPL-ACNC: 1 UIU/ML
UROBILINOGEN URINE: 0.2 MG/DL
WBC # FLD AUTO: 9.73 K/UL
WHITE BLOOD CELLS URINE: 1 /HPF

## 2025-09-08 ENCOUNTER — APPOINTMENT (OUTPATIENT)
Dept: FAMILY MEDICINE | Facility: CLINIC | Age: 85
End: 2025-09-08
Payer: MEDICARE

## 2025-09-08 DIAGNOSIS — E11.9 TYPE 2 DIABETES MELLITUS W/OUT COMPLICATIONS: ICD-10-CM

## 2025-09-08 DIAGNOSIS — I25.10 ATHEROSCLEROTIC HEART DISEASE OF NATIVE CORONARY ARTERY W/OUT ANGINA PECTORIS: ICD-10-CM

## 2025-09-08 PROCEDURE — 99213 OFFICE O/P EST LOW 20 MIN: CPT | Mod: 93

## 2025-09-09 RX ORDER — LANCETS
EACH MISCELLANEOUS
Qty: 1 | Refills: 0 | Status: ACTIVE | COMMUNITY
Start: 2025-09-09 | End: 1900-01-01

## 2025-09-10 RX ORDER — SEMAGLUTIDE 0.68 MG/ML
2 INJECTION, SOLUTION SUBCUTANEOUS
Qty: 1 | Refills: 1 | Status: ACTIVE | COMMUNITY
Start: 2025-09-08

## 2025-09-12 ENCOUNTER — APPOINTMENT (OUTPATIENT)
Dept: FAMILY MEDICINE | Facility: CLINIC | Age: 85
End: 2025-09-12

## 2025-09-15 RX ORDER — BLOOD SUGAR DIAGNOSTIC
STRIP MISCELLANEOUS
Qty: 2 | Refills: 1 | Status: ACTIVE | COMMUNITY
Start: 2025-09-09

## 2025-09-15 RX ORDER — BLOOD-GLUCOSE METER
W/DEVICE EACH MISCELLANEOUS
Qty: 1 | Refills: 0 | Status: ACTIVE | COMMUNITY
Start: 2025-09-09

## 2025-09-19 ENCOUNTER — NON-APPOINTMENT (OUTPATIENT)
Age: 85
End: 2025-09-19

## (undated) DEVICE — SUT VICRYL 3-0 27" SH

## (undated) DEVICE — SOL IRR POUR NS 0.9% 1000ML

## (undated) DEVICE — NDL HYPO SAFE 20G X 1.5" (YELLOW)

## (undated) DEVICE — SUT PROLENE 5-0 36" C-1

## (undated) DEVICE — URETERAL CATH RED RUBBER 16FR (ORANGE)

## (undated) DEVICE — ELCTR GROUNDING PAD ADULT COVIDIEN

## (undated) DEVICE — MAKO CHECKPOINT KIT FEMORAL / TIBIAL

## (undated) DEVICE — SYR LUER LOK 10CC

## (undated) DEVICE — HOOD FLYTE STRYKER SURGICOOL W PEELAWAY

## (undated) DEVICE — PRIORITY PACK WITH COPILOT 20/30

## (undated) DEVICE — DRAPE THYROID 77" X 123"

## (undated) DEVICE — SUCTION YANKAUER NO CONTROL VENT

## (undated) DEVICE — SYR CONTROL LUER LOK 10CC

## (undated) DEVICE — SOL IRR BAG NS 0.9% 3000ML

## (undated) DEVICE — DRAPE XL SHEET 77X98"

## (undated) DEVICE — SUT SILK 3-0 30" TIES

## (undated) DEVICE — DRSG DERMABOND 0.7ML

## (undated) DEVICE — SUT SILK 2-0 30" TIES

## (undated) DEVICE — SOL IRR POUR H2O 1000ML

## (undated) DEVICE — SYR LUER LOK 20CC

## (undated) DEVICE — MAKO DRAPE KIT

## (undated) DEVICE — DRAPE 3/4 SHEET 52X76"

## (undated) DEVICE — SUT VICRYL 2-0 27" CT-2 UNDYED

## (undated) DEVICE — SPECIMEN CONTAINER 4OZ

## (undated) DEVICE — GLV COTTON LG STERILE

## (undated) DEVICE — GLV 6.5 PROTEXIS (WHITE)

## (undated) DEVICE — SUT SILK 4-0 30" TIES

## (undated) DEVICE — BLADE SCALPEL SAFETYLOCK #15

## (undated) DEVICE — DRAPE STICKY U BLUE 60 X 84"

## (undated) DEVICE — MAKO STRYKER SILICONE RETRACTOR CORD

## (undated) DEVICE — VESSEL LOOP MINI-BLUE 0.075" X 16"

## (undated) DEVICE — VENODYNE/SCD SLEEVE CALF MEDIUM

## (undated) DEVICE — SUT SILK 0 30" SH

## (undated) DEVICE — COVER ULTRASOUND PROBE T-SHAPED INTRAOP SM

## (undated) DEVICE — POSITIONER LEG WRAP STERILE

## (undated) DEVICE — ELCTR STRYKER NEPTUNE SMOKE EVACUATION PENCIL (GREEN)

## (undated) DEVICE — PACK VASCULAR

## (undated) DEVICE — MAKO VIZADISC KNEE TRACKING KIT

## (undated) DEVICE — SLING ARM CHIEFTAIN MESH LARGE

## (undated) DEVICE — WOUND IRR SURGIPHOR

## (undated) DEVICE — DRAPE 1/2 SHEET 40X57"

## (undated) DEVICE — DRSG TEGADERM 4X4.75"

## (undated) DEVICE — DRSG MASTISOL

## (undated) DEVICE — DRAPE SPLIT SHEET 77" X 108"

## (undated) DEVICE — ZIMMER CEMENT MIXING SYSTEM COMPACT VACUUM

## (undated) DEVICE — PACK TOTAL KNEE

## (undated) DEVICE — MAKO BLADE NARROW

## (undated) DEVICE — DRAPE IOBAN 33" X 23"

## (undated) DEVICE — DRAPE C ARM UNIVERSAL

## (undated) DEVICE — DRSG TAPE UMBILICAL COTTON 2" X 30 X 1/8"

## (undated) DEVICE — SPONGE PEANUT AUTO COUNT

## (undated) DEVICE — GLV 8.5 PROTEXIS (WHITE)

## (undated) DEVICE — WARMING BLANKET LOWER ADULT

## (undated) DEVICE — GLV 6 PROTEXIS (WHITE)

## (undated) DEVICE — SYR LUER LOK 30CC

## (undated) DEVICE — HOOD T7 NON-PEELAWAY

## (undated) DEVICE — SUT MONOCRYL 3-0 27" PS-2 UNDYED

## (undated) DEVICE — ZIMMER PULSAVAC PLUS FAN KIT

## (undated) DEVICE — DRSG MEPILEX 10 X 25CM (4 X 10") POST OP AG SILVER

## (undated) DEVICE — SUT VICRYL 0 18" CT-1 UNDYED (POP-OFF)

## (undated) DEVICE — SUT MONOCRYL 4-0 27" PS-2 UNDYED

## (undated) DEVICE — NDL HYPO SAFE 18G X 1.5" (PINK)

## (undated) DEVICE — WARMING BLANKET UPPER ADULT

## (undated) DEVICE — STAPLER SKIN PROXIMATE

## (undated) DEVICE — NDL HYPO REGULAR BEVEL 25G X 1.5" (BLUE)

## (undated) DEVICE — SUT VICRYL 2-0 27" SH UNDYED

## (undated) DEVICE — MAKO BLADE STANDARD

## (undated) DEVICE — TAPE SILK 3"